# Patient Record
Sex: FEMALE
[De-identification: names, ages, dates, MRNs, and addresses within clinical notes are randomized per-mention and may not be internally consistent; named-entity substitution may affect disease eponyms.]

---

## 2017-01-24 ENCOUNTER — APPOINTMENT (OUTPATIENT)
Dept: OBGYN | Facility: CLINIC | Age: 67
End: 2017-01-24

## 2017-01-24 VITALS
SYSTOLIC BLOOD PRESSURE: 138 MMHG | WEIGHT: 190 LBS | BODY MASS INDEX: 30.53 KG/M2 | HEIGHT: 66 IN | DIASTOLIC BLOOD PRESSURE: 78 MMHG

## 2017-01-26 LAB — BACTERIA UR CULT: ABNORMAL

## 2017-12-14 ENCOUNTER — APPOINTMENT (OUTPATIENT)
Dept: OBGYN | Facility: CLINIC | Age: 67
End: 2017-12-14
Payer: MEDICARE

## 2017-12-14 VITALS
WEIGHT: 190 LBS | DIASTOLIC BLOOD PRESSURE: 69 MMHG | BODY MASS INDEX: 30.53 KG/M2 | HEIGHT: 66 IN | SYSTOLIC BLOOD PRESSURE: 146 MMHG

## 2017-12-14 LAB
BILIRUB UR QL STRIP: NORMAL
GLUCOSE UR-MCNC: NORMAL
HCG UR QL: 0.2 EU/DL
HGB UR QL STRIP.AUTO: NORMAL
LEUKOCYTE ESTERASE UR QL STRIP: NORMAL
NITRITE UR QL STRIP: NORMAL
PH UR STRIP: 5.5
PROT UR STRIP-MCNC: NORMAL

## 2017-12-14 PROCEDURE — 99213 OFFICE O/P EST LOW 20 MIN: CPT

## 2017-12-14 PROCEDURE — 81002 URINALYSIS NONAUTO W/O SCOPE: CPT

## 2017-12-20 ENCOUNTER — MESSAGE (OUTPATIENT)
Age: 67
End: 2017-12-20

## 2018-02-15 ENCOUNTER — APPOINTMENT (OUTPATIENT)
Dept: OBGYN | Facility: CLINIC | Age: 68
End: 2018-02-15
Payer: MEDICARE

## 2018-02-15 VITALS
DIASTOLIC BLOOD PRESSURE: 79 MMHG | HEIGHT: 66 IN | BODY MASS INDEX: 29.73 KG/M2 | SYSTOLIC BLOOD PRESSURE: 152 MMHG | WEIGHT: 185 LBS

## 2018-02-15 LAB
APPEARANCE: ABNORMAL
BACTERIA: ABNORMAL
BILIRUB UR QL STRIP: NORMAL
BILIRUBIN URINE: NEGATIVE
BLOOD URINE: ABNORMAL
COLOR: YELLOW
GLUCOSE QUALITATIVE U: NEGATIVE MG/DL
GLUCOSE UR-MCNC: NORMAL
HCG UR QL: 0.2 EU/DL
HGB UR QL STRIP.AUTO: NORMAL
HYALINE CASTS: 0 /LPF
KETONES UR-MCNC: NORMAL
KETONES URINE: NEGATIVE
LEUKOCYTE ESTERASE UR QL STRIP: NORMAL
LEUKOCYTE ESTERASE URINE: ABNORMAL
MICROSCOPIC-UA: NORMAL
NITRITE UR QL STRIP: NORMAL
NITRITE URINE: POSITIVE
PH UR STRIP: 6
PH URINE: 6
PROT UR STRIP-MCNC: 30
PROTEIN URINE: 30 MG/DL
RED BLOOD CELLS URINE: 25 /HPF
SP GR UR STRIP: 1.02
SPECIFIC GRAVITY URINE: 1.01
SQUAMOUS EPITHELIAL CELLS: 1 /HPF
UROBILINOGEN URINE: NEGATIVE MG/DL
WHITE BLOOD CELLS URINE: 558 /HPF

## 2018-02-15 PROCEDURE — 81002 URINALYSIS NONAUTO W/O SCOPE: CPT

## 2018-02-15 PROCEDURE — 99214 OFFICE O/P EST MOD 30 MIN: CPT

## 2018-02-15 RX ORDER — AMOXICILLIN 500 MG/1
500 CAPSULE ORAL
Qty: 40 | Refills: 0 | Status: COMPLETED | COMMUNITY
Start: 2017-11-21

## 2018-02-17 ENCOUNTER — MESSAGE (OUTPATIENT)
Age: 68
End: 2018-02-17

## 2018-02-17 LAB — BACTERIA UR CULT: ABNORMAL

## 2018-03-06 ENCOUNTER — RX RENEWAL (OUTPATIENT)
Age: 68
End: 2018-03-06

## 2018-03-06 ENCOUNTER — APPOINTMENT (OUTPATIENT)
Dept: OBGYN | Facility: CLINIC | Age: 68
End: 2018-03-06
Payer: MEDICARE

## 2018-03-06 VITALS
SYSTOLIC BLOOD PRESSURE: 130 MMHG | DIASTOLIC BLOOD PRESSURE: 83 MMHG | WEIGHT: 185 LBS | HEIGHT: 66 IN | BODY MASS INDEX: 29.73 KG/M2

## 2018-03-06 PROCEDURE — 99214 OFFICE O/P EST MOD 30 MIN: CPT

## 2018-03-06 RX ORDER — NITROFURANTOIN MACROCRYSTALS 100 MG/1
100 CAPSULE ORAL
Qty: 10 | Refills: 0 | Status: COMPLETED | COMMUNITY
Start: 2018-02-15 | End: 2018-03-06

## 2018-03-06 RX ORDER — PENICILLIN V POTASSIUM 500 MG/1
500 TABLET, FILM COATED ORAL
Qty: 28 | Refills: 0 | Status: COMPLETED | COMMUNITY
Start: 2018-02-02

## 2018-03-06 RX ORDER — TERCONAZOLE 4 MG/G
0.4 CREAM VAGINAL
Qty: 3 | Refills: 0 | Status: COMPLETED | COMMUNITY
Start: 2017-12-14 | End: 2018-03-06

## 2018-03-06 RX ORDER — CIPROFLOXACIN HYDROCHLORIDE 500 MG/1
500 TABLET, FILM COATED ORAL
Qty: 10 | Refills: 0 | Status: COMPLETED | COMMUNITY
Start: 2017-01-24 | End: 2018-03-06

## 2018-03-06 RX ORDER — TRIAMCINOLONE ACETONIDE 1 MG/G
0.1 CREAM TOPICAL
Qty: 30 | Refills: 0 | Status: COMPLETED | COMMUNITY
Start: 2018-02-07

## 2018-04-12 ENCOUNTER — APPOINTMENT (OUTPATIENT)
Dept: OBGYN | Facility: CLINIC | Age: 68
End: 2018-04-12
Payer: MEDICARE

## 2018-04-12 VITALS
WEIGHT: 186 LBS | SYSTOLIC BLOOD PRESSURE: 128 MMHG | HEIGHT: 66 IN | DIASTOLIC BLOOD PRESSURE: 77 MMHG | BODY MASS INDEX: 29.89 KG/M2

## 2018-04-12 PROCEDURE — 99214 OFFICE O/P EST MOD 30 MIN: CPT

## 2018-04-14 LAB
CANDIDA VAG CYTO: NOT DETECTED
G VAGINALIS+PREV SP MTYP VAG QL MICRO: NOT DETECTED
T VAGINALIS VAG QL WET PREP: NOT DETECTED

## 2018-10-31 ENCOUNTER — APPOINTMENT (OUTPATIENT)
Dept: OBGYN | Facility: CLINIC | Age: 68
End: 2018-10-31
Payer: MEDICARE

## 2018-10-31 VITALS
HEIGHT: 66 IN | DIASTOLIC BLOOD PRESSURE: 77 MMHG | WEIGHT: 186 LBS | BODY MASS INDEX: 29.89 KG/M2 | SYSTOLIC BLOOD PRESSURE: 130 MMHG

## 2018-10-31 DIAGNOSIS — N60.02 SOLITARY CYST OF LEFT BREAST: ICD-10-CM

## 2018-10-31 PROCEDURE — 99214 OFFICE O/P EST MOD 30 MIN: CPT

## 2018-10-31 RX ORDER — METRONIDAZOLE 7.5 MG/G
0.75 GEL VAGINAL
Qty: 1 | Refills: 5 | Status: COMPLETED | COMMUNITY
Start: 2018-04-12 | End: 2018-10-31

## 2018-10-31 RX ORDER — CIPROFLOXACIN HYDROCHLORIDE 500 MG/1
500 TABLET, FILM COATED ORAL
Qty: 10 | Refills: 3 | Status: COMPLETED | COMMUNITY
Start: 2018-02-17 | End: 2018-10-31

## 2018-10-31 RX ORDER — ESTRADIOL 1 MG/1
1 TABLET ORAL DAILY
Qty: 1 | Refills: 1 | Status: COMPLETED | COMMUNITY
Start: 2018-03-06 | End: 2018-10-31

## 2018-11-02 LAB — BACTERIA UR CULT: NORMAL

## 2019-01-09 ENCOUNTER — APPOINTMENT (OUTPATIENT)
Dept: OBGYN | Facility: CLINIC | Age: 69
End: 2019-01-09
Payer: MEDICARE

## 2019-01-09 DIAGNOSIS — Z01.419 ENCOUNTER FOR GYNECOLOGICAL EXAMINATION (GENERAL) (ROUTINE) W/OUT ABNORMAL FINDINGS: ICD-10-CM

## 2019-01-09 PROCEDURE — G0101: CPT

## 2019-01-13 LAB — CYTOLOGY CVX/VAG DOC THIN PREP: NORMAL

## 2019-02-08 ENCOUNTER — RX RENEWAL (OUTPATIENT)
Age: 69
End: 2019-02-08

## 2019-02-19 ENCOUNTER — RECORD ABSTRACTING (OUTPATIENT)
Age: 69
End: 2019-02-19

## 2019-02-19 DIAGNOSIS — S69.92XD UNSPECIFIED INJURY OF LEFT WRIST, HAND AND FINGER(S), SUBSEQUENT ENCOUNTER: ICD-10-CM

## 2019-02-20 ENCOUNTER — APPOINTMENT (OUTPATIENT)
Dept: INTERNAL MEDICINE | Facility: CLINIC | Age: 69
End: 2019-02-20
Payer: MEDICARE

## 2019-02-20 VITALS
HEIGHT: 66 IN | BODY MASS INDEX: 29.89 KG/M2 | WEIGHT: 186 LBS | DIASTOLIC BLOOD PRESSURE: 80 MMHG | HEART RATE: 80 BPM | SYSTOLIC BLOOD PRESSURE: 135 MMHG

## 2019-02-20 PROCEDURE — 99214 OFFICE O/P EST MOD 30 MIN: CPT

## 2019-02-20 RX ORDER — ATORVASTATIN CALCIUM 20 MG/1
20 TABLET, FILM COATED ORAL
Qty: 90 | Refills: 0 | Status: DISCONTINUED | COMMUNITY
Start: 2017-09-27 | End: 2019-02-20

## 2019-02-20 RX ORDER — LEVOCETIRIZINE DIHYDROCHLORIDE 5 MG/1
5 TABLET ORAL
Qty: 30 | Refills: 0 | Status: DISCONTINUED | COMMUNITY
Start: 2017-11-03 | End: 2019-02-20

## 2019-02-20 RX ORDER — OMEPRAZOLE 20 MG/1
20 CAPSULE, DELAYED RELEASE ORAL
Qty: 20 | Refills: 0 | Status: DISCONTINUED | COMMUNITY
Start: 2017-11-21 | End: 2019-02-20

## 2019-02-20 RX ORDER — CLARITHROMYCIN 500 MG/1
500 TABLET, FILM COATED ORAL
Qty: 20 | Refills: 0 | Status: DISCONTINUED | COMMUNITY
Start: 2017-11-21 | End: 2019-02-20

## 2019-02-20 RX ORDER — PANTOPRAZOLE 40 MG/1
40 TABLET, DELAYED RELEASE ORAL
Qty: 90 | Refills: 1 | Status: DISCONTINUED | COMMUNITY
Start: 2018-01-22 | End: 2019-02-20

## 2019-02-20 RX ORDER — OMEPRAZOLE 40 MG/1
40 CAPSULE, DELAYED RELEASE ORAL
Qty: 60 | Refills: 0 | Status: DISCONTINUED | COMMUNITY
Start: 2017-11-03 | End: 2019-02-20

## 2019-02-20 RX ORDER — LISINOPRIL AND HYDROCHLOROTHIAZIDE TABLETS 10; 12.5 MG/1; MG/1
10-12.5 TABLET ORAL
Qty: 90 | Refills: 0 | Status: DISCONTINUED | COMMUNITY
Start: 2017-09-27 | End: 2019-02-20

## 2019-02-20 RX ORDER — PSYLLIUM HUSK 0.4 G
CAPSULE ORAL
Refills: 0 | Status: DISCONTINUED | COMMUNITY
End: 2019-02-20

## 2019-02-20 RX ORDER — CIPROFLOXACIN HYDROCHLORIDE 500 MG/1
500 TABLET, FILM COATED ORAL
Qty: 14 | Refills: 0 | Status: DISCONTINUED | COMMUNITY
Start: 2018-10-31 | End: 2019-02-20

## 2019-02-20 RX ORDER — NYSTATIN 100000 [USP'U]/G
100000 CREAM TOPICAL
Qty: 60 | Refills: 0 | Status: DISCONTINUED | COMMUNITY
Start: 2018-02-07 | End: 2019-02-20

## 2019-02-20 RX ORDER — NYSTATIN AND TRIAMCINOLONE ACETONIDE 100000; 1 MG/G; MG/G
100000-0.1 CREAM TOPICAL 4 TIMES DAILY
Qty: 1 | Refills: 3 | Status: DISCONTINUED | COMMUNITY
Start: 2017-12-20 | End: 2019-02-20

## 2019-02-20 NOTE — ASSESSMENT
[FreeTextEntry1] : htn\par chronic? recurent abd pain\par generalized muscle and joint aches--r/o occult rheum d/o PMR?\par \par rheum eval suggested--trial medrol dose pack\par ct a+p with po contrst\par pt and  voiced understanding of plan of care

## 2019-02-20 NOTE — HISTORY OF PRESENT ILLNESS
[de-identified] : generlized muscle aches--seen 12/18 for same --neg rheum labs--was advised to see rheum at that time. Poor historian. Neg cardiac w/u 2017. Egd (mookie) 2017 neg for similar symps

## 2019-02-20 NOTE — PHYSICAL EXAM
[No Acute Distress] : no acute distress [Well Nourished] : well nourished [No Respiratory Distress] : no respiratory distress  [Clear to Auscultation] : lungs were clear to auscultation bilaterally [Normal Rate] : normal rate  [Soft] : abdomen soft [No Masses] : no abdominal mass palpated [de-identified] : upper abd pain

## 2019-04-29 ENCOUNTER — APPOINTMENT (OUTPATIENT)
Dept: INTERNAL MEDICINE | Facility: CLINIC | Age: 69
End: 2019-04-29
Payer: MEDICARE

## 2019-04-29 ENCOUNTER — RX RENEWAL (OUTPATIENT)
Age: 69
End: 2019-04-29

## 2019-04-29 VITALS
RESPIRATION RATE: 16 BRPM | HEIGHT: 67 IN | DIASTOLIC BLOOD PRESSURE: 88 MMHG | WEIGHT: 192 LBS | BODY MASS INDEX: 30.13 KG/M2 | SYSTOLIC BLOOD PRESSURE: 160 MMHG

## 2019-04-29 PROCEDURE — 36415 COLL VENOUS BLD VENIPUNCTURE: CPT

## 2019-04-29 PROCEDURE — 99213 OFFICE O/P EST LOW 20 MIN: CPT | Mod: 25

## 2019-04-29 NOTE — HISTORY OF PRESENT ILLNESS
[de-identified] :  Poor historian--saw Dr. Ayala (rheum)--"osteoarthritis"--going to [place] for 3 months and requests pre-trip labwork..recent middle of night insomnia

## 2019-04-29 NOTE — PHYSICAL EXAM
[No Acute Distress] : no acute distress [No Respiratory Distress] : no respiratory distress  [Normal Rate] : normal rate  [Regular Rhythm] : with a regular rhythm [Soft] : abdomen soft [Non Tender] : non-tender

## 2019-04-30 LAB
ANION GAP SERPL CALC-SCNC: 14 MMOL/L
BUN SERPL-MCNC: 9 MG/DL
CALCIUM SERPL-MCNC: 9.7 MG/DL
CHLORIDE SERPL-SCNC: 102 MMOL/L
CHOLEST SERPL-MCNC: 190 MG/DL
CHOLEST/HDLC SERPL: 3.6 RATIO
CO2 SERPL-SCNC: 27 MMOL/L
CREAT SERPL-MCNC: 0.71 MG/DL
ESTIMATED AVERAGE GLUCOSE: 131 MG/DL
GLUCOSE SERPL-MCNC: 94 MG/DL
HBA1C MFR BLD HPLC: 6.2 %
HDLC SERPL-MCNC: 53 MG/DL
LDLC SERPL CALC-MCNC: 111 MG/DL
POTASSIUM SERPL-SCNC: 4.3 MMOL/L
SODIUM SERPL-SCNC: 143 MMOL/L
TRIGL SERPL-MCNC: 132 MG/DL

## 2019-05-02 ENCOUNTER — RX RENEWAL (OUTPATIENT)
Age: 69
End: 2019-05-02

## 2019-05-07 ENCOUNTER — RX RENEWAL (OUTPATIENT)
Age: 69
End: 2019-05-07

## 2019-11-14 ENCOUNTER — APPOINTMENT (OUTPATIENT)
Dept: OBGYN | Facility: CLINIC | Age: 69
End: 2019-11-14
Payer: MEDICARE

## 2019-11-14 VITALS
SYSTOLIC BLOOD PRESSURE: 157 MMHG | HEIGHT: 67 IN | WEIGHT: 190 LBS | DIASTOLIC BLOOD PRESSURE: 80 MMHG | BODY MASS INDEX: 29.82 KG/M2

## 2019-11-14 DIAGNOSIS — R82.90 UNSPECIFIED ABNORMAL FINDINGS IN URINE: ICD-10-CM

## 2019-11-14 DIAGNOSIS — R87.610 ATYPICAL SQUAMOUS CELLS OF UNDETERMINED SIGNIFICANCE ON CYTOLOGIC SMEAR OF CERVIX (ASC-US): ICD-10-CM

## 2019-11-14 DIAGNOSIS — N94.9 UNSPECIFIED CONDITION ASSOCIATED WITH FEMALE GENITAL ORGANS AND MENSTRUAL CYCLE: ICD-10-CM

## 2019-11-14 DIAGNOSIS — Z87.42 PERSONAL HISTORY OF OTHER DISEASES OF THE FEMALE GENITAL TRACT: ICD-10-CM

## 2019-11-14 DIAGNOSIS — L29.2 PRURITUS VULVAE: ICD-10-CM

## 2019-11-14 DIAGNOSIS — N81.9 FEMALE GENITAL PROLAPSE, UNSPECIFIED: ICD-10-CM

## 2019-11-14 DIAGNOSIS — R87.810 CERVICAL HIGH RISK HUMAN PAPILLOMAVIRUS (HPV) DNA TEST POSITIVE: ICD-10-CM

## 2019-11-14 DIAGNOSIS — B37.3 CANDIDIASIS OF VULVA AND VAGINA: ICD-10-CM

## 2019-11-14 PROCEDURE — 99214 OFFICE O/P EST MOD 30 MIN: CPT

## 2019-11-15 ENCOUNTER — APPOINTMENT (OUTPATIENT)
Dept: INTERNAL MEDICINE | Facility: CLINIC | Age: 69
End: 2019-11-15
Payer: MEDICARE

## 2019-11-15 VITALS
BODY MASS INDEX: 28.25 KG/M2 | WEIGHT: 180 LBS | DIASTOLIC BLOOD PRESSURE: 78 MMHG | TEMPERATURE: 98.1 F | HEIGHT: 67 IN | RESPIRATION RATE: 16 BRPM | OXYGEN SATURATION: 98 % | SYSTOLIC BLOOD PRESSURE: 136 MMHG | HEART RATE: 64 BPM

## 2019-11-15 VITALS — SYSTOLIC BLOOD PRESSURE: 128 MMHG | DIASTOLIC BLOOD PRESSURE: 78 MMHG

## 2019-11-15 DIAGNOSIS — S22.009A UNSPECIFIED FRACTURE OF UNSPECIFIED THORACIC VERTEBRA, INITIAL ENCOUNTER FOR CLOSED FRACTURE: ICD-10-CM

## 2019-11-15 LAB — HPV HIGH+LOW RISK DNA PNL CVX: NOT DETECTED

## 2019-11-15 PROCEDURE — 99214 OFFICE O/P EST MOD 30 MIN: CPT | Mod: 25

## 2019-11-15 PROCEDURE — G0444 DEPRESSION SCREEN ANNUAL: CPT | Mod: 59

## 2019-11-15 PROCEDURE — 36415 COLL VENOUS BLD VENIPUNCTURE: CPT

## 2019-11-15 PROCEDURE — 90662 IIV NO PRSV INCREASED AG IM: CPT

## 2019-11-15 PROCEDURE — G0008: CPT

## 2019-11-15 NOTE — HISTORY OF PRESENT ILLNESS
[FreeTextEntry1] : f/u [de-identified] : f/u--poor historian--was overseas and apparently fracture lower back ?T6? from fall on step. Denies radicular symps. No surgical management

## 2019-11-15 NOTE — PHYSICAL EXAM
[Normal] : normal gait, coordination grossly intact, no focal deficits and deep tendon reflexes were 2+ and symmetric [de-identified] : local lower t spine pain

## 2019-11-18 ENCOUNTER — APPOINTMENT (OUTPATIENT)
Dept: OBGYN | Facility: CLINIC | Age: 69
End: 2019-11-18
Payer: MEDICARE

## 2019-11-18 ENCOUNTER — ASOB RESULT (OUTPATIENT)
Age: 69
End: 2019-11-18

## 2019-11-18 LAB
ANION GAP SERPL CALC-SCNC: 14 MMOL/L
BASOPHILS # BLD AUTO: 0.05 K/UL
BASOPHILS NFR BLD AUTO: 0.8 %
BUN SERPL-MCNC: 16 MG/DL
CALCIUM SERPL-MCNC: 9.9 MG/DL
CHLORIDE SERPL-SCNC: 99 MMOL/L
CO2 SERPL-SCNC: 27 MMOL/L
CREAT SERPL-MCNC: 0.7 MG/DL
EOSINOPHIL # BLD AUTO: 0.24 K/UL
EOSINOPHIL NFR BLD AUTO: 3.8 %
ESTIMATED AVERAGE GLUCOSE: 123 MG/DL
GLUCOSE SERPL-MCNC: 99 MG/DL
HBA1C MFR BLD HPLC: 5.9 %
HCT VFR BLD CALC: 40 %
HGB BLD-MCNC: 12.4 G/DL
IMM GRANULOCYTES NFR BLD AUTO: 0.3 %
LYMPHOCYTES # BLD AUTO: 2.67 K/UL
LYMPHOCYTES NFR BLD AUTO: 41.7 %
MAN DIFF?: NORMAL
MCHC RBC-ENTMCNC: 29.4 PG
MCHC RBC-ENTMCNC: 31 GM/DL
MCV RBC AUTO: 94.8 FL
MONOCYTES # BLD AUTO: 0.66 K/UL
MONOCYTES NFR BLD AUTO: 10.3 %
NEUTROPHILS # BLD AUTO: 2.76 K/UL
NEUTROPHILS NFR BLD AUTO: 43.1 %
PLATELET # BLD AUTO: 291 K/UL
POTASSIUM SERPL-SCNC: 4.4 MMOL/L
RBC # BLD: 4.22 M/UL
RBC # FLD: 13.9 %
SODIUM SERPL-SCNC: 140 MMOL/L
WBC # FLD AUTO: 6.4 K/UL

## 2019-11-18 PROCEDURE — 76830 TRANSVAGINAL US NON-OB: CPT

## 2019-11-20 LAB — CYTOLOGY CVX/VAG DOC THIN PREP: ABNORMAL

## 2019-11-22 ENCOUNTER — APPOINTMENT (OUTPATIENT)
Dept: CARDIOLOGY | Facility: CLINIC | Age: 69
End: 2019-11-22
Payer: MEDICARE

## 2019-11-22 ENCOUNTER — LABORATORY RESULT (OUTPATIENT)
Age: 69
End: 2019-11-22

## 2019-11-22 ENCOUNTER — NON-APPOINTMENT (OUTPATIENT)
Age: 69
End: 2019-11-22

## 2019-11-22 VITALS
SYSTOLIC BLOOD PRESSURE: 130 MMHG | HEIGHT: 67 IN | TEMPERATURE: 98.4 F | DIASTOLIC BLOOD PRESSURE: 77 MMHG | OXYGEN SATURATION: 97 % | RESPIRATION RATE: 16 BRPM | BODY MASS INDEX: 28.56 KG/M2 | HEART RATE: 61 BPM | WEIGHT: 182 LBS

## 2019-11-22 PROCEDURE — 93306 TTE W/DOPPLER COMPLETE: CPT

## 2019-11-22 PROCEDURE — 93000 ELECTROCARDIOGRAM COMPLETE: CPT

## 2019-11-22 PROCEDURE — 93880 EXTRACRANIAL BILAT STUDY: CPT

## 2019-11-22 PROCEDURE — 99215 OFFICE O/P EST HI 40 MIN: CPT

## 2019-11-22 PROCEDURE — 36415 COLL VENOUS BLD VENIPUNCTURE: CPT

## 2019-11-22 NOTE — REVIEW OF SYSTEMS
[see HPI] : see HPI [Dyspnea on exertion] : dyspnea during exertion [Palpitations] : palpitations [Joint Pain] : joint pain [Dizziness] : dizziness [Fever] : no fever [Recent Weight Gain (___ Lbs)] : no recent weight gain [Chills] : no chills [Recent Weight Loss (___ Lbs)] : no recent weight loss [Chest Pain] : no chest pain [Lower Ext Edema] : no extremity edema [Cough] : no cough [Wheezing] : no wheezing [Abdominal Pain] : no abdominal pain [Nausea] : no nausea [Vomiting] : no vomiting [Confusion] : no confusion was observed

## 2019-11-22 NOTE — PHYSICAL EXAM
[General Appearance - Well Developed] : well developed [Normal Appearance] : normal appearance [General Appearance - Well Nourished] : well nourished [Normal Conjunctiva] : the conjunctiva exhibited no abnormalities [Heart Rate And Rhythm] : heart rate and rhythm were normal [Heart Sounds] : normal S1 and S2 [Murmurs] : no murmurs present [Edema] : no peripheral edema present [Bowel Sounds] : normal bowel sounds [Abdomen Soft] : soft [Abdomen Tenderness] : non-tender [Skin Turgor] : normal skin turgor [Oriented To Time, Place, And Person] : oriented to person, place, and time [Affect] : the affect was normal [Mood] : the mood was normal [FreeTextEntry1] : regular

## 2019-11-22 NOTE — DISCUSSION/SUMMARY
[FreeTextEntry1] : 69F with HTN presents for initial visit with concerns for TIA\par \par 1. Amaurosis fugax, TIA\par EKG showing sinus, no known hx of afib.\par will consider event monitor at next appt\par check carotid, tte today. \par plan to start atorvastatin 20 mg\par cont asa 81\par f/u in 1 month or sooner pending results of studies\par \par \par ADDENDUM\par reviewed results of carotid duplex (full report to be scanned into [employer] chart) ICA and ECA poorly visualized at various segments, will send pt for CTA of the neck for further eval. pt called and discussed with pt and daughter on tlephone\par plan is for cta neck, cont  ASA 81, start statin: lipitor 40. f/u with Dr Mccullough in 1-2 weeks for eval  and cards f/u in 3-4 weeks

## 2019-11-22 NOTE — HISTORY OF PRESENT ILLNESS
[FreeTextEntry1] : 69F with HTN presents to establish cardiovascular care. \par PMD: Dr Ronnie Mccullough\par sent in by ophthalmologist with concerns for TIA\par \par \par pt states 3 days ago she experienced an episode where she couldn’t see out her right eye for 2-3 minutes, saw only black out her right eye. she maintained regular vision in her left eye. \par pt denies prev similar. \par pt denies prev events of slurred speech, aphasia, acute extremity weakness. \par pr denies sob, but does endorse feeling her heartbeat in her neck on occasion. (around once a month)\par \par pt does endorse pressure on the chest when she tries to walk fast, pt states every time she walks fast. \par \par recent labs done with PMD 11/19 reveal a1c 5.9, Cr .7. most recent lipid panel 4/19 reveal tot chol 190,  hdl 53 ldl 111\par \par med hx: arthritis. \par sx hx: jay. shoulder sx 20 yrs ago\par fam hx: denies hx of cardiac/cva\par social hx: lives at home, ( smokes) denies hc of tob, etoh. \par meds: asa 81, atenolol 50\par allergies: NKDA\par \par  rose \par \par cell  \par

## 2019-12-05 ENCOUNTER — APPOINTMENT (OUTPATIENT)
Dept: OBGYN | Facility: CLINIC | Age: 69
End: 2019-12-05
Payer: MEDICARE

## 2019-12-05 ENCOUNTER — LABORATORY RESULT (OUTPATIENT)
Age: 69
End: 2019-12-05

## 2019-12-05 VITALS
BODY MASS INDEX: 28.25 KG/M2 | HEIGHT: 67 IN | WEIGHT: 180 LBS | DIASTOLIC BLOOD PRESSURE: 74 MMHG | SYSTOLIC BLOOD PRESSURE: 134 MMHG

## 2019-12-05 DIAGNOSIS — R10.2 PELVIC AND PERINEAL PAIN: ICD-10-CM

## 2019-12-05 LAB
APPEARANCE: ABNORMAL
BILIRUBIN URINE: NEGATIVE
BLOOD URINE: ABNORMAL
COLOR: COLORLESS
GLUCOSE QUALITATIVE U: NEGATIVE
KETONES URINE: NEGATIVE
LEUKOCYTE ESTERASE URINE: NEGATIVE
NITRITE URINE: NEGATIVE
PH URINE: 7
PROTEIN URINE: NEGATIVE
SPECIFIC GRAVITY URINE: 1
UROBILINOGEN URINE: NORMAL

## 2019-12-05 PROCEDURE — 99213 OFFICE O/P EST LOW 20 MIN: CPT

## 2019-12-09 LAB — BACTERIA UR CULT: NORMAL

## 2019-12-23 ENCOUNTER — APPOINTMENT (OUTPATIENT)
Dept: INTERNAL MEDICINE | Facility: CLINIC | Age: 69
End: 2019-12-23
Payer: MEDICARE

## 2019-12-23 VITALS
SYSTOLIC BLOOD PRESSURE: 110 MMHG | HEART RATE: 64 BPM | HEIGHT: 67 IN | WEIGHT: 183 LBS | OXYGEN SATURATION: 96 % | BODY MASS INDEX: 28.72 KG/M2 | DIASTOLIC BLOOD PRESSURE: 66 MMHG | TEMPERATURE: 97.5 F

## 2019-12-23 DIAGNOSIS — G47.00 INSOMNIA, UNSPECIFIED: ICD-10-CM

## 2019-12-23 PROCEDURE — 99213 OFFICE O/P EST LOW 20 MIN: CPT

## 2019-12-23 NOTE — PHYSICAL EXAM
[Soft] : abdomen soft [Non-distended] : non-distended [No Masses] : no abdominal mass palpated [No HSM] : no HSM [Normal Bowel Sounds] : normal bowel sounds [Normal] : affect was normal and insight and judgment were intact [de-identified] : slight TTP of epigastric region

## 2019-12-23 NOTE — HISTORY OF PRESENT ILLNESS
[Spouse] : spouse [FreeTextEntry8] : 68 yo female c/o difficulty sleeping at night due to anxiety for the past week. States that because of the loss of sleep? she is feeling tiredness in arms and legs? also admits to some stomach pains over the past 5 days that causes her nausea. upon further questioning, pt states that she has been on bactrim for 5 days and has 2 days left. \par when questioned further, pt states that she is not here for her complaints but for med refill from a prior anxiety med from Dr Mccullough (xanax).

## 2019-12-23 NOTE — PLAN
[FreeTextEntry1] : sent prn supply of xanax 0.25 mg, educated pt not to drink on medication and to take solely as needed since only gave 10 pills\par educated pt that nausea and reflux feeling are most likely from bactrim that has been on since symptoms started the same day that abx was started, cont ppi that currently takes for reflux\par pt needs to make appointment with cardio as per Dr Carrero- george to return around now for 4 week follow up to discuss results of tests ordered

## 2019-12-23 NOTE — REVIEW OF SYSTEMS
[Negative] : Heme/Lymph [Muscle Weakness] : muscle weakness [Nausea] : nausea [Insomnia] : insomnia [Anxiety] : anxiety

## 2019-12-25 PROBLEM — R10.2 PELVIC PAIN IN FEMALE: Status: ACTIVE | Noted: 2017-01-24

## 2020-05-26 ENCOUNTER — APPOINTMENT (OUTPATIENT)
Dept: INTERNAL MEDICINE | Facility: CLINIC | Age: 70
End: 2020-05-26
Payer: MEDICARE

## 2020-05-26 VITALS
OXYGEN SATURATION: 98 % | RESPIRATION RATE: 16 BRPM | TEMPERATURE: 98.2 F | SYSTOLIC BLOOD PRESSURE: 146 MMHG | WEIGHT: 186 LBS | BODY MASS INDEX: 29.19 KG/M2 | HEIGHT: 67 IN | DIASTOLIC BLOOD PRESSURE: 80 MMHG | HEART RATE: 73 BPM

## 2020-05-26 PROCEDURE — 99214 OFFICE O/P EST MOD 30 MIN: CPT | Mod: 25

## 2020-05-26 PROCEDURE — 36415 COLL VENOUS BLD VENIPUNCTURE: CPT

## 2020-05-26 RX ORDER — ASCORBIC ACID 500 MG
TABLET ORAL
Refills: 0 | Status: ACTIVE | COMMUNITY

## 2020-05-26 RX ORDER — METHYLPREDNISOLONE 4 MG/1
4 TABLET ORAL
Qty: 1 | Refills: 0 | Status: DISCONTINUED | COMMUNITY
Start: 2019-02-20 | End: 2020-05-26

## 2020-05-26 RX ORDER — TRAMADOL HYDROCHLORIDE 50 MG/1
50 TABLET, COATED ORAL
Qty: 21 | Refills: 0 | Status: DISCONTINUED | COMMUNITY
Start: 2019-12-06 | End: 2020-05-26

## 2020-05-26 RX ORDER — ADHESIVE TAPE 3"X 2.3 YD
50 MCG TAPE, NON-MEDICATED TOPICAL
Refills: 0 | Status: ACTIVE | COMMUNITY

## 2020-05-26 RX ORDER — SULFAMETHOXAZOLE AND TRIMETHOPRIM 400; 80 MG/1; MG/1
400-80 TABLET ORAL
Qty: 14 | Refills: 0 | Status: DISCONTINUED | COMMUNITY
Start: 2019-12-18 | End: 2020-05-26

## 2020-05-26 NOTE — HISTORY OF PRESENT ILLNESS
[Hyperlipidemia] : Hyperlipidemia [Hypertension] : Hypertension [Other: ___] : [unfilled] [<140/90] : Target blood pressure is <140/90 [Does not check BP] : The patient is not checking blood pressure [Near target goal] : BP near target goal [Stable] : Patient is stable [Patient declined therapy] : Patient declined statin therapy [Landonifestyle management] : lifestyle management [Spouse] : spouse [Patient was last seen on ___] : Patient was last seen on [unfilled] [FreeTextEntry1] : anxiety- stable, no new symptoms

## 2020-05-26 NOTE — PHYSICAL EXAM
[Normal] : normal sclera/conjunctiva, pupils are equal, round and reactive to light and extraocular movements are intact [Normal TMs] : both tympanic membranes were normal [Normal Outer Ear/Nose] : the outer ears and nose were normal in appearance

## 2020-06-01 LAB
ALBUMIN SERPL ELPH-MCNC: 4.6 G/DL
ALP BLD-CCNC: 74 U/L
ALT SERPL-CCNC: <5 U/L
ANION GAP SERPL CALC-SCNC: 12 MMOL/L
AST SERPL-CCNC: 7 U/L
BASOPHILS # BLD AUTO: 0.06 K/UL
BASOPHILS NFR BLD AUTO: 0.8 %
BILIRUB SERPL-MCNC: 0.4 MG/DL
BUN SERPL-MCNC: 12 MG/DL
CALCIUM SERPL-MCNC: 9.6 MG/DL
CHLORIDE SERPL-SCNC: 101 MMOL/L
CHOLEST SERPL-MCNC: 164 MG/DL
CHOLEST/HDLC SERPL: 2.8 RATIO
CO2 SERPL-SCNC: 28 MMOL/L
CREAT SERPL-MCNC: 0.72 MG/DL
EOSINOPHIL # BLD AUTO: 0.24 K/UL
EOSINOPHIL NFR BLD AUTO: 3.4 %
ESTIMATED AVERAGE GLUCOSE: 126 MG/DL
GLUCOSE SERPL-MCNC: 109 MG/DL
HBA1C MFR BLD HPLC: 6 %
HCT VFR BLD CALC: 39.1 %
HDLC SERPL-MCNC: 58 MG/DL
HGB BLD-MCNC: 12.2 G/DL
IMM GRANULOCYTES NFR BLD AUTO: 0.4 %
LDLC SERPL CALC-MCNC: 79 MG/DL
LYMPHOCYTES # BLD AUTO: 2.62 K/UL
LYMPHOCYTES NFR BLD AUTO: 37 %
MAN DIFF?: NORMAL
MCHC RBC-ENTMCNC: 30.7 PG
MCHC RBC-ENTMCNC: 31.2 GM/DL
MCV RBC AUTO: 98.2 FL
MONOCYTES # BLD AUTO: 0.78 K/UL
MONOCYTES NFR BLD AUTO: 11 %
NEUTROPHILS # BLD AUTO: 3.36 K/UL
NEUTROPHILS NFR BLD AUTO: 47.4 %
PLATELET # BLD AUTO: 244 K/UL
POTASSIUM SERPL-SCNC: 5 MMOL/L
PROT SERPL-MCNC: 7.4 G/DL
RBC # BLD: 3.98 M/UL
RBC # FLD: 12.7 %
SODIUM SERPL-SCNC: 141 MMOL/L
TRIGL SERPL-MCNC: 134 MG/DL
WBC # FLD AUTO: 7.09 K/UL

## 2020-06-30 ENCOUNTER — APPOINTMENT (OUTPATIENT)
Dept: INTERNAL MEDICINE | Facility: CLINIC | Age: 70
End: 2020-06-30
Payer: MEDICARE

## 2020-06-30 VITALS
SYSTOLIC BLOOD PRESSURE: 131 MMHG | TEMPERATURE: 97.8 F | OXYGEN SATURATION: 95 % | RESPIRATION RATE: 16 BRPM | HEART RATE: 69 BPM | WEIGHT: 187 LBS | DIASTOLIC BLOOD PRESSURE: 79 MMHG | HEIGHT: 67 IN | BODY MASS INDEX: 29.35 KG/M2

## 2020-06-30 PROCEDURE — 99214 OFFICE O/P EST MOD 30 MIN: CPT | Mod: 25

## 2020-06-30 PROCEDURE — G0444 DEPRESSION SCREEN ANNUAL: CPT | Mod: 59

## 2020-06-30 PROCEDURE — 36415 COLL VENOUS BLD VENIPUNCTURE: CPT

## 2020-06-30 NOTE — PHYSICAL EXAM
[No JVD] : no jugular venous distention [Normal] : normal rate, regular rhythm, normal S1 and S2 and no murmur heard [Coordination Grossly Intact] : coordination grossly intact [Normal Gait] : normal gait [Anxious] : anxious [No Focal Deficits] : no focal deficits

## 2020-06-30 NOTE — REVIEW OF SYSTEMS
[Joint Stiffness] : joint stiffness [Joint Pain] : joint pain [Headache] : headache [Muscle Pain] : muscle pain [Insomnia] : insomnia [Negative] : Respiratory [Vision Problems] : no vision problems [Pain] : no pain [Fainting] : no fainting [Dizziness] : no dizziness [Memory Loss] : no memory loss [Confusion] : no confusion

## 2020-06-30 NOTE — HISTORY OF PRESENT ILLNESS
[FreeTextEntry8] : CC: Headache\par \par New persistent HA for past 5 days located along left parietal region.  Went to  on Sunday with negative CT. \par States has tried tylenol and aleve without relief.  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 \par Denies new vision changes, focal or sensory deficits. \par Hx of chronic cervical pain-currently endorses pain which radiates into upper posterior neck

## 2020-07-01 LAB
ALBUMIN SERPL ELPH-MCNC: 5 G/DL
ALP BLD-CCNC: 80 U/L
ALT SERPL-CCNC: 10 U/L
ANION GAP SERPL CALC-SCNC: 16 MMOL/L
AST SERPL-CCNC: 18 U/L
BILIRUB SERPL-MCNC: 0.4 MG/DL
BUN SERPL-MCNC: 15 MG/DL
CALCIUM SERPL-MCNC: 10 MG/DL
CHLORIDE SERPL-SCNC: 101 MMOL/L
CO2 SERPL-SCNC: 22 MMOL/L
CREAT SERPL-MCNC: 0.91 MG/DL
GLUCOSE SERPL-MCNC: 100 MG/DL
POTASSIUM SERPL-SCNC: 4.5 MMOL/L
PROT SERPL-MCNC: 7.7 G/DL
SODIUM SERPL-SCNC: 139 MMOL/L

## 2020-07-02 LAB
BASOPHILS # BLD AUTO: 0.06 K/UL
BASOPHILS NFR BLD AUTO: 0.8 %
EOSINOPHIL # BLD AUTO: 0.25 K/UL
EOSINOPHIL NFR BLD AUTO: 3.1 %
ERYTHROCYTE [SEDIMENTATION RATE] IN BLOOD BY WESTERGREN METHOD: 17 MM/HR
HCT VFR BLD CALC: 37 %
HGB BLD-MCNC: 11.9 G/DL
IMM GRANULOCYTES NFR BLD AUTO: 0.4 %
LYMPHOCYTES # BLD AUTO: 3.04 K/UL
LYMPHOCYTES NFR BLD AUTO: 38.1 %
MAN DIFF?: NORMAL
MCHC RBC-ENTMCNC: 30.9 PG
MCHC RBC-ENTMCNC: 32.2 GM/DL
MCV RBC AUTO: 96.1 FL
MONOCYTES # BLD AUTO: 0.85 K/UL
MONOCYTES NFR BLD AUTO: 10.7 %
NEUTROPHILS # BLD AUTO: 3.75 K/UL
NEUTROPHILS NFR BLD AUTO: 46.9 %
PLATELET # BLD AUTO: 259 K/UL
RBC # BLD: 3.85 M/UL
RBC # FLD: 13.1 %
WBC # FLD AUTO: 7.98 K/UL

## 2020-07-09 ENCOUNTER — APPOINTMENT (OUTPATIENT)
Dept: NEUROLOGY | Facility: CLINIC | Age: 70
End: 2020-07-09

## 2020-08-17 ENCOUNTER — RX RENEWAL (OUTPATIENT)
Age: 70
End: 2020-08-17

## 2020-08-18 ENCOUNTER — RX RENEWAL (OUTPATIENT)
Age: 70
End: 2020-08-18

## 2020-10-05 ENCOUNTER — APPOINTMENT (OUTPATIENT)
Dept: CARDIOLOGY | Facility: CLINIC | Age: 70
End: 2020-10-05
Payer: MEDICARE

## 2020-10-05 ENCOUNTER — NON-APPOINTMENT (OUTPATIENT)
Age: 70
End: 2020-10-05

## 2020-10-05 VITALS
OXYGEN SATURATION: 95 % | TEMPERATURE: 99 F | WEIGHT: 190 LBS | DIASTOLIC BLOOD PRESSURE: 75 MMHG | HEIGHT: 67 IN | SYSTOLIC BLOOD PRESSURE: 129 MMHG | RESPIRATION RATE: 16 BRPM | HEART RATE: 67 BPM | BODY MASS INDEX: 29.82 KG/M2

## 2020-10-05 PROCEDURE — 93000 ELECTROCARDIOGRAM COMPLETE: CPT

## 2020-10-05 PROCEDURE — 99215 OFFICE O/P EST HI 40 MIN: CPT

## 2020-10-05 NOTE — HISTORY OF PRESENT ILLNESS
[FreeTextEntry1] : 70F with HTN presents for f/u\par PMD: Dr Ronnie Mccullough\par \par pt last seen in cardiology in 11/19 for an initial eval with concerns for a TIA. \par \par sent in by ophthalmologist with concerns for TIA. TTE done at that time was grossly normal. carotid u/s done was without evidence of severe stenosis, but images were limited, CTA neck showed no significant carotid stenosis but did show b/l supraclinoid int carotid anueryms 4 mm on R and 6mm on L.\par \par labs done with PMD 11/19 showed a1c 5.9, Cr .7. most recent lipid panel 4/19 reveal tot chol 190,  hdl 53 ldl 111\par \par today, pt presents for f/u. pt c/o headaches, last one 3 weeks ago. \par pt denies cp, sob, at rest or on exertion. denies palpitations, syncope. \par pt states her BP has been high lately, she checks it at home, usually 140-150s systolic. \par \par \par med hx: arthritis. \par sx hx: jay. shoulder sx 20 yrs ago\par fam hx: denies hx of cardiac/cva\par social hx: lives at home, ( smokes) denies hc of tob, etoh. \par meds: asa 81, atenolol 50\par allergies: NKDA\par \par  rose \par \par cell  \par

## 2020-10-05 NOTE — REVIEW OF SYSTEMS
[Dizziness] : dizziness [Fever] : no fever [Recent Weight Gain (___ Lbs)] : no recent weight gain [Chills] : no chills [Recent Weight Loss (___ Lbs)] : no recent weight loss [Sore Throat] : no sore throat [Shortness Of Breath] : no shortness of breath [Dyspnea on exertion] : not dyspnea during exertion [Chest  Pressure] : no chest pressure [Chest Pain] : no chest pain [Lower Ext Edema] : no extremity edema [Leg Claudication] : no intermittent leg claudication [Palpitations] : no palpitations [Cough] : no cough [Wheezing] : no wheezing [Nausea] : no nausea [Vomiting] : no vomiting [Confusion] : no confusion was observed [Excessive Thirst] : no polydipsia [Easy Bleeding] : no tendency for easy bleeding [Easy Bruising] : no tendency for easy bruising

## 2020-10-05 NOTE — PHYSICAL EXAM
[General Appearance - Well Developed] : well developed [General Appearance - Well Nourished] : well nourished [] : no respiratory distress [Respiration, Rhythm And Depth] : normal respiratory rhythm and effort [Exaggerated Use Of Accessory Muscles For Inspiration] : no accessory muscle use [Auscultation Breath Sounds / Voice Sounds] : lungs were clear to auscultation bilaterally [Heart Rate And Rhythm] : heart rate and rhythm were normal [Heart Sounds] : normal S1 and S2 [Murmurs] : no murmurs present [Edema] : no peripheral edema present [Bowel Sounds] : normal bowel sounds [Abdomen Soft] : soft [Abdomen Tenderness] : non-tender [Abnormal Walk] : normal gait [Skin Turgor] : normal skin turgor [Oriented To Time, Place, And Person] : oriented to person, place, and time [Impaired Insight] : insight and judgment were intact [Affect] : the affect was normal [Mood] : the mood was normal [FreeTextEntry1] : radial pulses 2+ b/l

## 2020-10-05 NOTE — DISCUSSION/SUMMARY
[FreeTextEntry1] : 70F with HTN presents for f/u\par \par 1. Amaurosis fugax, TIA\par -no further episodes, however with persistent HA episodes, last >3 weeks ago\par -pt has f/u with her optho, will also refer to neuro\par -may be related to HTN\par -will d/c atenolol and start norvasc 10 po qd\par \par -f/u in 2 weeks for bp check, will consider adding ace-I at that time. \par \par \par \par

## 2020-10-19 ENCOUNTER — MED ADMIN CHARGE (OUTPATIENT)
Age: 70
End: 2020-10-19

## 2020-10-19 ENCOUNTER — APPOINTMENT (OUTPATIENT)
Dept: CARDIOLOGY | Facility: CLINIC | Age: 70
End: 2020-10-19
Payer: MEDICARE

## 2020-10-19 VITALS
WEIGHT: 190 LBS | HEIGHT: 67 IN | SYSTOLIC BLOOD PRESSURE: 125 MMHG | HEART RATE: 100 BPM | TEMPERATURE: 98.2 F | OXYGEN SATURATION: 97 % | DIASTOLIC BLOOD PRESSURE: 75 MMHG | BODY MASS INDEX: 29.82 KG/M2 | RESPIRATION RATE: 16 BRPM

## 2020-10-19 PROCEDURE — 99215 OFFICE O/P EST HI 40 MIN: CPT

## 2020-10-19 NOTE — PHYSICAL EXAM
[General Appearance - Well Developed] : well developed [General Appearance - Well Nourished] : well nourished [] : no respiratory distress [Respiration, Rhythm And Depth] : normal respiratory rhythm and effort [Auscultation Breath Sounds / Voice Sounds] : lungs were clear to auscultation bilaterally [Exaggerated Use Of Accessory Muscles For Inspiration] : no accessory muscle use [Heart Rate And Rhythm] : heart rate and rhythm were normal [Heart Sounds] : normal S1 and S2 [Murmurs] : no murmurs present [Edema] : no peripheral edema present [Abdomen Soft] : soft [Bowel Sounds] : normal bowel sounds [Skin Turgor] : normal skin turgor [Abdomen Tenderness] : non-tender [Abnormal Walk] : normal gait [Oriented To Time, Place, And Person] : oriented to person, place, and time [Affect] : the affect was normal [Impaired Insight] : insight and judgment were intact [Mood] : the mood was normal [FreeTextEntry1] : radial pulses 2+ b/l, carotid bruits not appreciated

## 2020-10-19 NOTE — REVIEW OF SYSTEMS
[Dizziness] : dizziness [Headache] : headache [Fever] : no fever [Chills] : no chills [Recent Weight Loss (___ Lbs)] : no recent weight loss [Recent Weight Gain (___ Lbs)] : no recent weight gain [Blurry Vision] : blurred vision [see HPI] : see HPI [Loss Of Hearing] : hearing loss [Shortness Of Breath] : no shortness of breath [Sore Throat] : no sore throat [Dyspnea on exertion] : not dyspnea during exertion [Chest  Pressure] : no chest pressure [Chest Pain] : no chest pain [Lower Ext Edema] : no extremity edema [Leg Claudication] : no intermittent leg claudication [Wheezing] : no wheezing [Cough] : no cough [Palpitations] : no palpitations [Vomiting] : no vomiting [Nausea] : no nausea [Excessive Thirst] : no polydipsia [Confusion] : no confusion was observed [Easy Bleeding] : no tendency for easy bleeding [Easy Bruising] : no tendency for easy bruising

## 2020-10-19 NOTE — HISTORY OF PRESENT ILLNESS
[FreeTextEntry1] : 70F with HTN presents for f/u\par PMD: Dr Ronnie Mccullough\par neuro: Jose Luis Rodrigues \par \par pt last seen in cardiology in 10/5/2020 for HA in setting of HTN. \par \par pt prev sent in by ophthalmologist with concerns for TIA. TTE done at that time was grossly normal. carotid u/s done was without evidence of severe stenosis, but images were limited, CTA neck showed no significant carotid stenosis but did show b/l supraclinoid int carotid anueryms 4 mm on R and 6mm on L.\par \par labs done with PMD 11/19 showed a1c 5.9, Cr .7. most recent lipid panel 4/19 reveal tot chol 190,  hdl 53 ldl 111\par \par following last visit, pt went to see neurology, per notes (to be scanned into [employer] chart) pt will begin therapeutic trigger point injections and will be sent for neurovascular/NeuroSx eval with Dr Adi Newell  (assoc with [facility])\par \par today, pt presents for f/u. pt c/o headaches,also c/o of persistent "whoosing" sound in her ears. \par pt states good compliance with norvasc but since d/c the atenolol, her pulse has been consistently over 100. \par \par pt states that the trigger injections have not been helpful. \par \par pt denies cp, sob, at rest or on exertion. denies palpitations, syncope. \par pt states her BP has been high lately, she checks it at home, bp log brought in today, usually 130-150s systolic. \par \par \par med hx: arthritis. \par sx hx: jay. shoulder sx 20 yrs ago\par fam hx: denies hx of cardiac/cva\par social hx: from [place]. lives at home, ( smokes) denies hc of tob, etoh. \par meds: asa 81, atenolol 50, norvasc 10\par allergies: NKDA\par \par  rose \par \par cell  \par \par

## 2020-10-19 NOTE — DISCUSSION/SUMMARY
[FreeTextEntry1] : 70F with HTN presents for f/u\par \par 1. Amaurosis fugax, TIA\par -no further episodes, however with persistent HA episodes, now with possible tinnitus\par -being followed by neuro and pt has appt to see neuroSx\par -HTN better controlled, now on norvasc but since tachycardia is persisting, will restart atenolol as well. \par -cont bp log\par \par -flu shot today\par -f/u in 1 mo for bp check\par \par \par \par \par

## 2020-11-23 ENCOUNTER — APPOINTMENT (OUTPATIENT)
Dept: CARDIOLOGY | Facility: CLINIC | Age: 70
End: 2020-11-23

## 2020-12-21 PROBLEM — Z01.419 ENCOUNTER FOR GYNECOLOGICAL EXAMINATION WITHOUT ABNORMAL FINDING: Status: RESOLVED | Noted: 2019-01-09 | Resolved: 2020-12-21

## 2021-01-09 ENCOUNTER — APPOINTMENT (OUTPATIENT)
Dept: INTERNAL MEDICINE | Facility: CLINIC | Age: 71
End: 2021-01-09
Payer: MEDICARE

## 2021-01-09 VITALS
HEART RATE: 70 BPM | TEMPERATURE: 98.5 F | BODY MASS INDEX: 29.86 KG/M2 | OXYGEN SATURATION: 95 % | HEIGHT: 67 IN | WEIGHT: 190.25 LBS

## 2021-01-09 VITALS — DIASTOLIC BLOOD PRESSURE: 72 MMHG | SYSTOLIC BLOOD PRESSURE: 130 MMHG

## 2021-01-09 PROCEDURE — 99214 OFFICE O/P EST MOD 30 MIN: CPT | Mod: 25

## 2021-01-09 PROCEDURE — 36415 COLL VENOUS BLD VENIPUNCTURE: CPT

## 2021-01-09 NOTE — HISTORY OF PRESENT ILLNESS
[Family Member] : family member [FreeTextEntry8] : vague historian\par reviewed recent cardio notes\par reviewed meds--stopped amlodipine\par seeing ortho for neck pain (tarelton)\par lower abd pain for 10 days--no bowel symps, no triggers\par saw nsx--brings scans in ?has f/u 6 months\par meds updated

## 2021-01-09 NOTE — PHYSICAL EXAM
[Normal] : normal rate, regular rhythm, normal S1 and S2 and no murmur heard [No Masses] : no abdominal mass palpated [No HSM] : no HSM [Normal Bowel Sounds] : normal bowel sounds [Non-distended] : non-distended [de-identified] : nonspec lower abd pain

## 2021-01-11 LAB
ALBUMIN SERPL ELPH-MCNC: 4.7 G/DL
ALP BLD-CCNC: 92 U/L
ALT SERPL-CCNC: 7 U/L
ANION GAP SERPL CALC-SCNC: 16 MMOL/L
APPEARANCE: CLEAR
AST SERPL-CCNC: 16 U/L
BACTERIA UR CULT: NORMAL
BACTERIA: NEGATIVE
BASOPHILS # BLD AUTO: 0.04 K/UL
BASOPHILS NFR BLD AUTO: 0.4 %
BILIRUB SERPL-MCNC: 0.4 MG/DL
BILIRUBIN URINE: NEGATIVE
BLOOD URINE: NEGATIVE
BUN SERPL-MCNC: 13 MG/DL
CALCIUM SERPL-MCNC: 9.8 MG/DL
CHLORIDE SERPL-SCNC: 102 MMOL/L
CO2 SERPL-SCNC: 23 MMOL/L
COLOR: YELLOW
CREAT SERPL-MCNC: 0.84 MG/DL
EOSINOPHIL # BLD AUTO: 0.31 K/UL
EOSINOPHIL NFR BLD AUTO: 3.4 %
ESTIMATED AVERAGE GLUCOSE: 123 MG/DL
GLUCOSE QUALITATIVE U: NEGATIVE
GLUCOSE SERPL-MCNC: 109 MG/DL
HBA1C MFR BLD HPLC: 5.9 %
HCT VFR BLD CALC: 39.5 %
HGB BLD-MCNC: 12.7 G/DL
HYALINE CASTS: 1 /LPF
IMM GRANULOCYTES NFR BLD AUTO: 0.2 %
KETONES URINE: NEGATIVE
LEUKOCYTE ESTERASE URINE: NEGATIVE
LYMPHOCYTES # BLD AUTO: 3.07 K/UL
LYMPHOCYTES NFR BLD AUTO: 34 %
MAN DIFF?: NORMAL
MCHC RBC-ENTMCNC: 29.7 PG
MCHC RBC-ENTMCNC: 32.2 GM/DL
MCV RBC AUTO: 92.3 FL
MICROSCOPIC-UA: NORMAL
MONOCYTES # BLD AUTO: 0.87 K/UL
MONOCYTES NFR BLD AUTO: 9.6 %
NEUTROPHILS # BLD AUTO: 4.71 K/UL
NEUTROPHILS NFR BLD AUTO: 52.4 %
NITRITE URINE: NEGATIVE
PH URINE: 6.5
PLATELET # BLD AUTO: 260 K/UL
POTASSIUM SERPL-SCNC: 4.6 MMOL/L
PROT SERPL-MCNC: 7.7 G/DL
PROTEIN URINE: NORMAL
RBC # BLD: 4.28 M/UL
RBC # FLD: 13.3 %
RED BLOOD CELLS URINE: 1 /HPF
SODIUM SERPL-SCNC: 140 MMOL/L
SPECIFIC GRAVITY URINE: 1.02
SQUAMOUS EPITHELIAL CELLS: 3 /HPF
UROBILINOGEN URINE: NORMAL
WBC # FLD AUTO: 9.02 K/UL
WHITE BLOOD CELLS URINE: 3 /HPF

## 2021-01-22 ENCOUNTER — APPOINTMENT (OUTPATIENT)
Dept: OBGYN | Facility: CLINIC | Age: 71
End: 2021-01-22
Payer: MEDICARE

## 2021-01-22 ENCOUNTER — ASOB RESULT (OUTPATIENT)
Age: 71
End: 2021-01-22

## 2021-01-22 VITALS
DIASTOLIC BLOOD PRESSURE: 80 MMHG | WEIGHT: 189 LBS | BODY MASS INDEX: 30.37 KG/M2 | SYSTOLIC BLOOD PRESSURE: 139 MMHG | HEIGHT: 66 IN

## 2021-01-22 DIAGNOSIS — R82.998 OTHER ABNORMAL FINDINGS IN URINE: ICD-10-CM

## 2021-01-22 PROCEDURE — 76830 TRANSVAGINAL US NON-OB: CPT

## 2021-01-22 PROCEDURE — 99214 OFFICE O/P EST MOD 30 MIN: CPT

## 2021-01-22 NOTE — HISTORY OF PRESENT ILLNESS
[FreeTextEntry1] : 70 yr old did not undress and  complained of vaginal itch and abd pelvic pain. Paps were normal  no need to do.  compete other exam done. Lungs clearm no thyromegaly.  Has external vaginal itching and Affirm done. Sent Lotrisone to pharmacy and had a sonogram done and normal but a lot of gas which is most likely her pain. Uterus fine and told her to take Mylicon for gas. Referral for mammography and bone density.

## 2021-01-22 NOTE — DISCUSSION/SUMMARY
[FreeTextEntry1] : 70 yr old and did exam and pelvic sonogram normal but too much gas to see ovaries. Told her to use Mylocon.  Mammo and bone density given. Sent Lotrisone for vaginal itching if affirm positive will treat. Urine showed leukocytes on clean catch and sent urine culture so if any  positive will call her and treat.

## 2021-01-24 ENCOUNTER — NON-APPOINTMENT (OUTPATIENT)
Age: 71
End: 2021-01-24

## 2021-01-24 LAB
BACTERIA UR CULT: NORMAL
CANDIDA VAG CYTO: NOT DETECTED
G VAGINALIS+PREV SP MTYP VAG QL MICRO: DETECTED
T VAGINALIS VAG QL WET PREP: NOT DETECTED

## 2021-02-08 ENCOUNTER — NON-APPOINTMENT (OUTPATIENT)
Age: 71
End: 2021-02-08

## 2021-03-02 ENCOUNTER — NON-APPOINTMENT (OUTPATIENT)
Age: 71
End: 2021-03-02

## 2021-03-02 ENCOUNTER — APPOINTMENT (OUTPATIENT)
Dept: INTERNAL MEDICINE | Facility: CLINIC | Age: 71
End: 2021-03-02
Payer: MEDICARE

## 2021-03-02 VITALS
OXYGEN SATURATION: 96 % | SYSTOLIC BLOOD PRESSURE: 134 MMHG | DIASTOLIC BLOOD PRESSURE: 79 MMHG | TEMPERATURE: 98.2 F | HEART RATE: 68 BPM | HEIGHT: 66 IN | WEIGHT: 192 LBS | BODY MASS INDEX: 30.86 KG/M2

## 2021-03-02 DIAGNOSIS — R10.13 EPIGASTRIC PAIN: ICD-10-CM

## 2021-03-02 DIAGNOSIS — M81.0 AGE-RELATED OSTEOPOROSIS W/OUT CURRENT PATHOLOGICAL FRACTURE: ICD-10-CM

## 2021-03-02 PROCEDURE — 99213 OFFICE O/P EST LOW 20 MIN: CPT

## 2021-03-02 NOTE — HISTORY OF PRESENT ILLNESS
[Family Member] : family member [FreeTextEntry1] : abd pain [de-identified] : now has upper abd pain--lower abd pain is better--vague historian--no triggers, no alarm GI symps--neg ct a+p reviewed\par seeing --pt doesn’t know name\par takes omeprazole daily for ?4 yrs

## 2021-03-02 NOTE — PHYSICAL EXAM
[Normal] : no jugular venous distention, supple, no lymphadenopathy and the thyroid was normal and there were no nodules present [Normal Bowel Sounds] : normal bowel sounds [No Hernias] : no hernias [de-identified] : non spec gen discomfort

## 2021-03-02 NOTE — ASSESSMENT
[FreeTextEntry1] : GI referral\par pt advised to observe symps and keep diary of exacerb and remissions

## 2021-03-19 RX ORDER — ALENDRONATE SODIUM 70 MG/1
70 TABLET ORAL
Qty: 1 | Refills: 4 | Status: ACTIVE | COMMUNITY
Start: 2021-03-19 | End: 1900-01-01

## 2021-03-21 ENCOUNTER — RX RENEWAL (OUTPATIENT)
Age: 71
End: 2021-03-21

## 2021-03-21 ENCOUNTER — NON-APPOINTMENT (OUTPATIENT)
Age: 71
End: 2021-03-21

## 2021-04-11 ENCOUNTER — RESULT CHARGE (OUTPATIENT)
Age: 71
End: 2021-04-11

## 2021-04-12 ENCOUNTER — NON-APPOINTMENT (OUTPATIENT)
Age: 71
End: 2021-04-12

## 2021-04-12 ENCOUNTER — APPOINTMENT (OUTPATIENT)
Dept: INTERNAL MEDICINE | Facility: CLINIC | Age: 71
End: 2021-04-12
Payer: MEDICARE

## 2021-04-12 VITALS
OXYGEN SATURATION: 98 % | HEIGHT: 66 IN | TEMPERATURE: 98 F | BODY MASS INDEX: 30.37 KG/M2 | SYSTOLIC BLOOD PRESSURE: 150 MMHG | DIASTOLIC BLOOD PRESSURE: 86 MMHG | HEART RATE: 71 BPM | WEIGHT: 189 LBS

## 2021-04-12 VITALS — DIASTOLIC BLOOD PRESSURE: 74 MMHG | SYSTOLIC BLOOD PRESSURE: 130 MMHG

## 2021-04-12 DIAGNOSIS — B96.89 ACUTE VAGINITIS: ICD-10-CM

## 2021-04-12 DIAGNOSIS — Z87.39 PERSONAL HISTORY OF OTHER DISEASES OF THE MUSCULOSKELETAL SYSTEM AND CONNECTIVE TISSUE: ICD-10-CM

## 2021-04-12 DIAGNOSIS — G44.52 NEW DAILY PERSISTENT HEADACHE (NDPH): ICD-10-CM

## 2021-04-12 DIAGNOSIS — Z87.42 PERSONAL HISTORY OF OTHER DISEASES OF THE FEMALE GENITAL TRACT: ICD-10-CM

## 2021-04-12 DIAGNOSIS — N95.2 POSTMENOPAUSAL ATROPHIC VAGINITIS: ICD-10-CM

## 2021-04-12 DIAGNOSIS — N76.0 ACUTE VAGINITIS: ICD-10-CM

## 2021-04-12 DIAGNOSIS — R82.998 OTHER ABNORMAL FINDINGS IN URINE: ICD-10-CM

## 2021-04-12 DIAGNOSIS — Z87.898 PERSONAL HISTORY OF OTHER SPECIFIED CONDITIONS: ICD-10-CM

## 2021-04-12 DIAGNOSIS — R10.10 UPPER ABDOMINAL PAIN, UNSPECIFIED: ICD-10-CM

## 2021-04-12 DIAGNOSIS — Z13.31 ENCOUNTER FOR SCREENING FOR DEPRESSION: ICD-10-CM

## 2021-04-12 PROCEDURE — 93000 ELECTROCARDIOGRAM COMPLETE: CPT

## 2021-04-12 PROCEDURE — 99214 OFFICE O/P EST MOD 30 MIN: CPT | Mod: 25

## 2021-04-12 PROCEDURE — 36415 COLL VENOUS BLD VENIPUNCTURE: CPT

## 2021-04-13 ENCOUNTER — APPOINTMENT (OUTPATIENT)
Dept: OBGYN | Facility: CLINIC | Age: 71
End: 2021-04-13
Payer: MEDICARE

## 2021-04-13 VITALS — WEIGHT: 189 LBS | BODY MASS INDEX: 30.37 KG/M2 | HEIGHT: 66 IN

## 2021-04-13 LAB
ABO + RH PNL BLD: NORMAL
ALBUMIN SERPL ELPH-MCNC: 4.4 G/DL
ALP BLD-CCNC: 95 U/L
ALT SERPL-CCNC: 9 U/L
ANION GAP SERPL CALC-SCNC: 12 MMOL/L
APPEARANCE: ABNORMAL
APTT BLD: 31.5 SEC
AST SERPL-CCNC: 16 U/L
BACTERIA: ABNORMAL
BASOPHILS # BLD AUTO: 0.07 K/UL
BASOPHILS NFR BLD AUTO: 0.9 %
BILIRUB SERPL-MCNC: 0.3 MG/DL
BILIRUBIN URINE: NEGATIVE
BLOOD URINE: NORMAL
BUN SERPL-MCNC: 11 MG/DL
CALCIUM SERPL-MCNC: 9.4 MG/DL
CHLORIDE SERPL-SCNC: 103 MMOL/L
CO2 SERPL-SCNC: 24 MMOL/L
COLOR: YELLOW
CREAT SERPL-MCNC: 0.68 MG/DL
EOSINOPHIL # BLD AUTO: 0.3 K/UL
EOSINOPHIL NFR BLD AUTO: 3.8 %
GLUCOSE QUALITATIVE U: NEGATIVE
GLUCOSE SERPL-MCNC: 104 MG/DL
HCT VFR BLD CALC: 39.4 %
HGB BLD-MCNC: 12.7 G/DL
HYALINE CASTS: 0 /LPF
IMM GRANULOCYTES NFR BLD AUTO: 0.1 %
INR PPP: 0.99 RATIO
KETONES URINE: NEGATIVE
LEUKOCYTE ESTERASE URINE: ABNORMAL
LYMPHOCYTES # BLD AUTO: 3.17 K/UL
LYMPHOCYTES NFR BLD AUTO: 40.5 %
MAN DIFF?: NORMAL
MCHC RBC-ENTMCNC: 29.8 PG
MCHC RBC-ENTMCNC: 32.2 GM/DL
MCV RBC AUTO: 92.5 FL
MICROSCOPIC-UA: NORMAL
MONOCYTES # BLD AUTO: 0.83 K/UL
MONOCYTES NFR BLD AUTO: 10.6 %
NEUTROPHILS # BLD AUTO: 3.44 K/UL
NEUTROPHILS NFR BLD AUTO: 44.1 %
NITRITE URINE: POSITIVE
PH URINE: 6.5
PLATELET # BLD AUTO: 305 K/UL
POTASSIUM SERPL-SCNC: 4.2 MMOL/L
PROT SERPL-MCNC: 7.3 G/DL
PROTEIN URINE: NORMAL
PT BLD: 11.8 SEC
RBC # BLD: 4.26 M/UL
RBC # FLD: 13.6 %
RED BLOOD CELLS URINE: 2 /HPF
SODIUM SERPL-SCNC: 139 MMOL/L
SPECIFIC GRAVITY URINE: 1.01
SQUAMOUS EPITHELIAL CELLS: 4 /HPF
UROBILINOGEN URINE: NORMAL
WBC # FLD AUTO: 7.82 K/UL
WHITE BLOOD CELLS URINE: 81 /HPF

## 2021-04-13 PROCEDURE — 99213 OFFICE O/P EST LOW 20 MIN: CPT

## 2021-04-13 NOTE — PHYSICAL EXAM
[Normal] : urethra [Labia Majora] : labia major [Labia Minora] : labia minora [Enlarged] : was enlarged [FreeTextEntry4] : nl scant discharge [FreeTextEntry7] : bladder tender to touch

## 2021-04-13 NOTE — HISTORY OF PRESENT ILLNESS
[No Pertinent Cardiac History] : no history of aortic stenosis, atrial fibrillation, coronary artery disease, recent myocardial infarction, or implantable device/pacemaker [No Pertinent Pulmonary History] : no history of asthma, COPD, sleep apnea, or smoking [No Adverse Anesthesia Reaction] : no adverse anesthesia reaction in self or family member [(Patient denies any chest pain, claudication, dyspnea on exertion, orthopnea, palpitations or syncope)] : Patient denies any chest pain, claudication, dyspnea on exertion, orthopnea, palpitations or syncope [Spouse] : spouse [Chronic Anticoagulation] : no chronic anticoagulation [Chronic Kidney Disease] : no chronic kidney disease [Diabetes] : no diabetes [FreeTextEntry1] : cervical neck cauterization [FreeTextEntry2] : 4/16/21 [FreeTextEntry3] : Dr Jc [FreeTextEntry4] : 70 yo female here for pre-op.\par fax#: [phone]

## 2021-04-13 NOTE — ADDENDUM
[FreeTextEntry1] : 4/13/21: pt ua +leuks and nitrities, urine culture pending; pt symptomatic today and saw gyn Dr Michaels this morning who started patient on Macrobid  mg x7 days, pt cleared for procedure Friday if symptoms resolved and culture shows response to Macrobid

## 2021-04-15 ENCOUNTER — TRANSCRIPTION ENCOUNTER (OUTPATIENT)
Age: 71
End: 2021-04-15

## 2021-04-15 LAB — BACTERIA UR CULT: ABNORMAL

## 2021-04-16 ENCOUNTER — OUTPATIENT (OUTPATIENT)
Dept: OUTPATIENT SERVICES | Facility: HOSPITAL | Age: 71
LOS: 1 days | Discharge: HOME | End: 2021-04-16
Payer: MEDICARE

## 2021-04-16 DIAGNOSIS — R73.03 PREDIABETES: ICD-10-CM

## 2021-04-16 DIAGNOSIS — I67.1 CEREBRAL ANEURYSM, NONRUPTURED: ICD-10-CM

## 2021-04-16 DIAGNOSIS — F41.8 OTHER SPECIFIED ANXIETY DISORDERS: ICD-10-CM

## 2021-04-16 DIAGNOSIS — I10 ESSENTIAL (PRIMARY) HYPERTENSION: ICD-10-CM

## 2021-04-16 DIAGNOSIS — E78.5 HYPERLIPIDEMIA, UNSPECIFIED: ICD-10-CM

## 2021-04-16 LAB — BACTERIA UR CULT: ABNORMAL

## 2021-04-16 RX ORDER — SODIUM CHLORIDE 9 MG/ML
1000 INJECTION INTRAMUSCULAR; INTRAVENOUS; SUBCUTANEOUS
Refills: 0 | Status: DISCONTINUED | OUTPATIENT
Start: 2021-04-16 | End: 2021-05-01

## 2021-04-16 NOTE — PROGRESS NOTE ADULT - SUBJECTIVE AND OBJECTIVE BOX
Neuroendovascular Pre-Op Note    HPI: 71 year old female w/ Osteoarthritis, HTN, Anxiety, GERD presents for elective cerebral angiography. Patient reports chronic neck and head pain    PMH:  PSH:  SocHx;  Allergies:    Medications:    Exam:  Gen: NAD, AAOx3.  HEENT: PERRL. EOMI. VF grossly intact.  Neck: FROM, nontender  Lungs: Clear b/l  Heart: S1, S2. NSR.  Abd: Soft, NT/ND. +BS  Exts: Pulses 2+ throughout  Neuro: CNs II-XII intact. 5/5 str x4 extremities. Sensation to LT intact. Following commands. Neuroendovascular Pre-Op Note    HPI: 71 year old female w/ Osteoarthritis, HTN, Anxiety, GERD presents for elective cerebral angiography. Patient reports chronic neck and head pain, but otherwise denies visual or hearing changes, extremity weakness or numbness, or gait changes. Outpatient imaging work-up suggestive of bilateral posterior communicating artery aneurysms. Patient takes Aspirin daily which she has held for the past few days in anticipation of today's procedure.    PMH: As above  PSH: h/o of shoulder surgery and cholecystectomy  SocHx: Denies smoking,  Allergies: NKDA    Medications: Aspirin 81mg, Atenolol 50mg daily, Fish Oil 1000mg, Calcium 600mg, Toviaz 4mg daily, Detrol 5mg,     Exam:  Gen: NAD, AAOx3.  HEENT: PERRL. EOMI. VF grossly intact.  Neck: FROM, nontender  Lungs: Clear b/l  Heart: S1, S2. NSR.  Abd: Soft, NT/ND. +BS  Exts: Pulses 2+ throughout  Neuro: CNs II-XII intact. 5/5 str x4 extremities. Sensation to LT intact. Following commands.

## 2021-04-16 NOTE — PROGRESS NOTE ADULT - PROBLEM SELECTOR PLAN 1
Plan for cerebral angiogram,  Consent in chart, all R/B/A discussed,  Outpatient clearance reviewed,  COVID PCR performed, negative  Plan for DC home after period of bedrest,  D/w Dr. Jc

## 2021-04-16 NOTE — PROGRESS NOTE ADULT - ASSESSMENT
71 year old female w/ HTN, Osteoarthritis, Anxiety, GERD with headaches and outpatient work-up with findings of bilateral PCOMM artery aneurysms.

## 2021-04-16 NOTE — BRIEF OPERATIVE NOTE - OPERATION/FINDINGS
Femoral cerebral angiogram performed under MAC sedation via right CFA using a 5Fr sheath. Five vessel angiogram performed with findings of a 4x5mm right bilobed wide neck PCOMM artery aneurysm, as well as a left 4x5mm PCOMM aneurysm. 3D spin performed for right PCOMM aneurysm however technical issues with machine precluded a spin on the left side. Left vertebral artery and bilateral ECA injections without pathology. Right groin closed with exoseal and manual compression, hemostasis obtained.    Full report to follow, d/w Dr. Jc

## 2021-04-17 PROCEDURE — C1760: CPT

## 2021-04-17 PROCEDURE — C1887: CPT

## 2021-04-17 PROCEDURE — C1894: CPT

## 2021-04-17 PROCEDURE — C1769: CPT

## 2021-04-17 PROCEDURE — 36224 PLACE CATH CAROTD ART: CPT | Mod: RT

## 2021-04-17 PROCEDURE — 36226 PLACE CATH VERTEBRAL ART: CPT | Mod: LT

## 2021-04-17 PROCEDURE — 37227: CPT | Mod: LT

## 2021-04-21 ENCOUNTER — RX RENEWAL (OUTPATIENT)
Age: 71
End: 2021-04-21

## 2021-04-29 ENCOUNTER — RX RENEWAL (OUTPATIENT)
Age: 71
End: 2021-04-29

## 2021-06-10 ENCOUNTER — NON-APPOINTMENT (OUTPATIENT)
Age: 71
End: 2021-06-10

## 2021-06-22 ENCOUNTER — APPOINTMENT (OUTPATIENT)
Dept: INTERNAL MEDICINE | Facility: CLINIC | Age: 71
End: 2021-06-22
Payer: MEDICARE

## 2021-06-22 VITALS
HEART RATE: 69 BPM | HEIGHT: 66 IN | SYSTOLIC BLOOD PRESSURE: 127 MMHG | TEMPERATURE: 98.9 F | WEIGHT: 188 LBS | OXYGEN SATURATION: 95 % | BODY MASS INDEX: 30.22 KG/M2 | DIASTOLIC BLOOD PRESSURE: 72 MMHG

## 2021-06-22 PROCEDURE — 99213 OFFICE O/P EST LOW 20 MIN: CPT

## 2021-06-22 NOTE — HISTORY OF PRESENT ILLNESS
[Spouse] : spouse [FreeTextEntry1] : cough for 3 days [de-identified] : no fever--cough for 3 days\par got covid vaccine\par pt states she is going for vascular nsx procedure on 7/9

## 2021-07-06 ENCOUNTER — NON-APPOINTMENT (OUTPATIENT)
Age: 71
End: 2021-07-06

## 2021-07-06 ENCOUNTER — APPOINTMENT (OUTPATIENT)
Dept: INTERNAL MEDICINE | Facility: CLINIC | Age: 71
End: 2021-07-06
Payer: MEDICARE

## 2021-07-06 VITALS
HEART RATE: 83 BPM | SYSTOLIC BLOOD PRESSURE: 130 MMHG | HEIGHT: 66 IN | OXYGEN SATURATION: 98 % | DIASTOLIC BLOOD PRESSURE: 72 MMHG | TEMPERATURE: 98.2 F | WEIGHT: 188 LBS | BODY MASS INDEX: 30.22 KG/M2

## 2021-07-06 DIAGNOSIS — M54.2 CERVICALGIA: ICD-10-CM

## 2021-07-06 DIAGNOSIS — J06.9 ACUTE UPPER RESPIRATORY INFECTION, UNSPECIFIED: ICD-10-CM

## 2021-07-06 DIAGNOSIS — R31.21 ASYMPTOMATIC MICROSCOPIC HEMATURIA: ICD-10-CM

## 2021-07-06 DIAGNOSIS — Z87.898 PERSONAL HISTORY OF OTHER SPECIFIED CONDITIONS: ICD-10-CM

## 2021-07-06 DIAGNOSIS — R82.998 OTHER ABNORMAL FINDINGS IN URINE: ICD-10-CM

## 2021-07-06 DIAGNOSIS — R39.9 UNSPECIFIED SYMPTOMS AND SIGNS INVOLVING THE GENITOURINARY SYSTEM: ICD-10-CM

## 2021-07-06 LAB
ALBUMIN SERPL ELPH-MCNC: 4.7 G/DL
ALP BLD-CCNC: 64 U/L
ALT SERPL-CCNC: 21 U/L
ANION GAP SERPL CALC-SCNC: 13 MMOL/L
APPEARANCE: ABNORMAL
APTT BLD: 30.3 SEC
AST SERPL-CCNC: 23 U/L
BACTERIA: ABNORMAL
BASOPHILS # BLD AUTO: 0.04 K/UL
BASOPHILS NFR BLD AUTO: 0.5 %
BILIRUB SERPL-MCNC: 0.5 MG/DL
BILIRUBIN URINE: NEGATIVE
BLOOD URINE: NEGATIVE
BUN SERPL-MCNC: 11 MG/DL
CALCIUM SERPL-MCNC: 9.9 MG/DL
CHLORIDE SERPL-SCNC: 103 MMOL/L
CO2 SERPL-SCNC: 25 MMOL/L
COLOR: YELLOW
CREAT SERPL-MCNC: 0.71 MG/DL
EOSINOPHIL # BLD AUTO: 0.45 K/UL
EOSINOPHIL NFR BLD AUTO: 6.1 %
GLUCOSE QUALITATIVE U: NEGATIVE
GLUCOSE SERPL-MCNC: 122 MG/DL
HCT VFR BLD CALC: 38.6 %
HGB BLD-MCNC: 12.8 G/DL
HYALINE CASTS: 1 /LPF
IMM GRANULOCYTES NFR BLD AUTO: 0.5 %
INR PPP: 0.96 RATIO
KETONES URINE: NEGATIVE
LEUKOCYTE ESTERASE URINE: ABNORMAL
LYMPHOCYTES # BLD AUTO: 1.98 K/UL
LYMPHOCYTES NFR BLD AUTO: 26.9 %
MAN DIFF?: NORMAL
MCHC RBC-ENTMCNC: 29.9 PG
MCHC RBC-ENTMCNC: 33.2 GM/DL
MCV RBC AUTO: 90.2 FL
MICROSCOPIC-UA: NORMAL
MONOCYTES # BLD AUTO: 0.75 K/UL
MONOCYTES NFR BLD AUTO: 10.2 %
NEUTROPHILS # BLD AUTO: 4.11 K/UL
NEUTROPHILS NFR BLD AUTO: 55.8 %
NITRITE URINE: NEGATIVE
PH URINE: 6.5
PLATELET # BLD AUTO: 293 K/UL
POTASSIUM SERPL-SCNC: 4.8 MMOL/L
PROT SERPL-MCNC: 7.5 G/DL
PROTEIN URINE: NORMAL
PT BLD: 11.3 SEC
RBC # BLD: 4.28 M/UL
RBC # FLD: 13.2 %
RED BLOOD CELLS URINE: 4 /HPF
SODIUM SERPL-SCNC: 140 MMOL/L
SPECIFIC GRAVITY URINE: 1.02
SQUAMOUS EPITHELIAL CELLS: 9 /HPF
UROBILINOGEN URINE: NORMAL
WBC # FLD AUTO: 7.37 K/UL
WHITE BLOOD CELLS URINE: 50 /HPF

## 2021-07-06 PROCEDURE — 93000 ELECTROCARDIOGRAM COMPLETE: CPT

## 2021-07-06 PROCEDURE — 36415 COLL VENOUS BLD VENIPUNCTURE: CPT

## 2021-07-06 PROCEDURE — 99214 OFFICE O/P EST MOD 30 MIN: CPT | Mod: 25

## 2021-07-06 RX ORDER — BENZONATATE 100 MG/1
100 CAPSULE ORAL 3 TIMES DAILY
Qty: 21 | Refills: 0 | Status: DISCONTINUED | COMMUNITY
Start: 2021-06-22 | End: 2021-07-06

## 2021-07-06 RX ORDER — OXYBUTYNIN CHLORIDE 5 MG/1
5 TABLET ORAL
Refills: 0 | Status: DISCONTINUED | COMMUNITY
End: 2021-07-06

## 2021-07-06 RX ORDER — AZITHROMYCIN 250 MG/1
250 TABLET, FILM COATED ORAL
Qty: 1 | Refills: 0 | Status: DISCONTINUED | COMMUNITY
Start: 2021-06-22 | End: 2021-07-06

## 2021-07-06 RX ORDER — TRIMETHOPRIM 100 MG/1
100 TABLET ORAL
Refills: 0 | Status: DISCONTINUED | COMMUNITY
End: 2021-07-06

## 2021-07-06 RX ORDER — METRONIDAZOLE 7.5 MG/G
0.75 GEL VAGINAL
Qty: 70 | Refills: 0 | Status: DISCONTINUED | COMMUNITY
Start: 2021-01-24 | End: 2021-07-06

## 2021-07-06 RX ORDER — CLOTRIMAZOLE AND BETAMETHASONE DIPROPIONATE 10; .5 MG/G; MG/G
1-0.05 CREAM TOPICAL
Qty: 1 | Refills: 4 | Status: DISCONTINUED | COMMUNITY
Start: 2021-01-22 | End: 2021-07-06

## 2021-07-06 RX ORDER — NITROFURANTOIN (MONOHYDRATE/MACROCRYSTALS) 25; 75 MG/1; MG/1
100 CAPSULE ORAL
Qty: 42 | Refills: 0 | Status: DISCONTINUED | COMMUNITY
Start: 2021-04-13 | End: 2021-07-06

## 2021-07-06 NOTE — HISTORY OF PRESENT ILLNESS
[No Pertinent Cardiac History] : no history of aortic stenosis, atrial fibrillation, coronary artery disease, recent myocardial infarction, or implantable device/pacemaker [No Pertinent Pulmonary History] : no history of asthma, COPD, sleep apnea, or smoking [No Adverse Anesthesia Reaction] : no adverse anesthesia reaction in self or family member [(Patient denies any chest pain, claudication, dyspnea on exertion, orthopnea, palpitations or syncope)] : Patient denies any chest pain, claudication, dyspnea on exertion, orthopnea, palpitations or syncope [Chronic Anticoagulation] : no chronic anticoagulation [Chronic Kidney Disease] : no chronic kidney disease [Diabetes] : no diabetes [FreeTextEntry1] : cervical neck cauterization? [FreeTextEntry2] : 07/08/21 [FreeTextEntry3] : Dr. Jc [FreeTextEntry4] : vascular nsx procedure\par unsure of exact procedure, came in without any paperwork just that needs ekg and bloodwork for clearance that is due tomorrow\par Fax #s:\par [phone] (Dr Jc)\par [phone] ([facility])\par accompanied by , s/w daughter on the phone

## 2021-07-06 NOTE — PLAN
[FreeTextEntry1] : educated patient that sending bw/urine out state but all may not be back by due date of 7/7 (tomorrow)\par pending normal bloodwork, patient cleared for proposed procedure

## 2021-07-08 ENCOUNTER — OUTPATIENT (OUTPATIENT)
Dept: OUTPATIENT SERVICES | Facility: HOSPITAL | Age: 71
LOS: 1 days | End: 2021-07-08
Payer: MEDICARE

## 2021-07-08 LAB
ABO + RH PNL BLD: NORMAL
ANION GAP SERPL CALC-SCNC: 11 MMOL/L — SIGNIFICANT CHANGE UP (ref 5–17)
APTT BLD: 29.1 SEC — SIGNIFICANT CHANGE UP (ref 27.5–35.5)
BASOPHILS # BLD AUTO: 0.05 K/UL — SIGNIFICANT CHANGE UP (ref 0–0.2)
BASOPHILS NFR BLD AUTO: 0.6 % — SIGNIFICANT CHANGE UP (ref 0–2)
BUN SERPL-MCNC: 12 MG/DL — SIGNIFICANT CHANGE UP (ref 7–23)
CALCIUM SERPL-MCNC: 9.2 MG/DL — SIGNIFICANT CHANGE UP (ref 8.4–10.5)
CHLORIDE SERPL-SCNC: 105 MMOL/L — SIGNIFICANT CHANGE UP (ref 96–108)
CO2 SERPL-SCNC: 27 MMOL/L — SIGNIFICANT CHANGE UP (ref 22–31)
CREAT SERPL-MCNC: 0.68 MG/DL — SIGNIFICANT CHANGE UP (ref 0.5–1.3)
EOSINOPHIL # BLD AUTO: 0.53 K/UL — HIGH (ref 0–0.5)
EOSINOPHIL NFR BLD AUTO: 6.7 % — HIGH (ref 0–6)
GLUCOSE SERPL-MCNC: 119 MG/DL — HIGH (ref 70–99)
HCT VFR BLD CALC: 36.5 % — SIGNIFICANT CHANGE UP (ref 34.5–45)
HGB BLD-MCNC: 12 G/DL — SIGNIFICANT CHANGE UP (ref 11.5–15.5)
IMM GRANULOCYTES NFR BLD AUTO: 0.3 % — SIGNIFICANT CHANGE UP (ref 0–1.5)
INR BLD: 0.96 — SIGNIFICANT CHANGE UP (ref 0.88–1.16)
LYMPHOCYTES # BLD AUTO: 2.05 K/UL — SIGNIFICANT CHANGE UP (ref 1–3.3)
LYMPHOCYTES # BLD AUTO: 26 % — SIGNIFICANT CHANGE UP (ref 13–44)
MCHC RBC-ENTMCNC: 30.1 PG — SIGNIFICANT CHANGE UP (ref 27–34)
MCHC RBC-ENTMCNC: 32.9 GM/DL — SIGNIFICANT CHANGE UP (ref 32–36)
MCV RBC AUTO: 91.5 FL — SIGNIFICANT CHANGE UP (ref 80–100)
MONOCYTES # BLD AUTO: 0.81 K/UL — SIGNIFICANT CHANGE UP (ref 0–0.9)
MONOCYTES NFR BLD AUTO: 10.3 % — SIGNIFICANT CHANGE UP (ref 2–14)
NEUTROPHILS # BLD AUTO: 4.41 K/UL — SIGNIFICANT CHANGE UP (ref 1.8–7.4)
NEUTROPHILS NFR BLD AUTO: 56.1 % — SIGNIFICANT CHANGE UP (ref 43–77)
NRBC # BLD: 0 /100 WBCS — SIGNIFICANT CHANGE UP (ref 0–0)
PA ADP PRP-ACNC: 161 PRU — LOW (ref 194–418)
PLATELET # BLD AUTO: 241 K/UL — SIGNIFICANT CHANGE UP (ref 150–400)
PLATELET RESPONSE ASPIRIN RESULT: 562 ARU — SIGNIFICANT CHANGE UP (ref 350–700)
POTASSIUM SERPL-MCNC: 4.1 MMOL/L — SIGNIFICANT CHANGE UP (ref 3.5–5.3)
POTASSIUM SERPL-SCNC: 4.1 MMOL/L — SIGNIFICANT CHANGE UP (ref 3.5–5.3)
PROTHROM AB SERPL-ACNC: 11.5 SEC — SIGNIFICANT CHANGE UP (ref 10.6–13.6)
RBC # BLD: 3.99 M/UL — SIGNIFICANT CHANGE UP (ref 3.8–5.2)
RBC # FLD: 13.1 % — SIGNIFICANT CHANGE UP (ref 10.3–14.5)
SODIUM SERPL-SCNC: 143 MMOL/L — SIGNIFICANT CHANGE UP (ref 135–145)
WBC # BLD: 7.87 K/UL — SIGNIFICANT CHANGE UP (ref 3.8–10.5)
WBC # FLD AUTO: 7.87 K/UL — SIGNIFICANT CHANGE UP (ref 3.8–10.5)

## 2021-07-08 PROCEDURE — 80048 BASIC METABOLIC PNL TOTAL CA: CPT

## 2021-07-08 PROCEDURE — 85610 PROTHROMBIN TIME: CPT

## 2021-07-08 PROCEDURE — 85730 THROMBOPLASTIN TIME PARTIAL: CPT

## 2021-07-08 PROCEDURE — 85576 BLOOD PLATELET AGGREGATION: CPT

## 2021-07-08 PROCEDURE — 85025 COMPLETE CBC W/AUTO DIFF WBC: CPT

## 2021-07-08 PROCEDURE — 36415 COLL VENOUS BLD VENIPUNCTURE: CPT

## 2021-07-08 RX ORDER — CLOPIDOGREL BISULFATE 75 MG/1
150 TABLET, FILM COATED ORAL ONCE
Refills: 0 | Status: COMPLETED | OUTPATIENT
Start: 2021-07-08 | End: 2021-07-08

## 2021-07-08 RX ORDER — ASPIRIN/CALCIUM CARB/MAGNESIUM 324 MG
325 TABLET ORAL ONCE
Refills: 0 | Status: COMPLETED | OUTPATIENT
Start: 2021-07-08 | End: 2021-07-08

## 2021-07-08 RX ORDER — CHLORHEXIDINE GLUCONATE 213 G/1000ML
1 SOLUTION TOPICAL ONCE
Refills: 0 | Status: DISCONTINUED | OUTPATIENT
Start: 2021-07-08 | End: 2021-07-22

## 2021-07-08 RX ADMIN — Medication 325 MILLIGRAM(S): at 09:51

## 2021-07-08 RX ADMIN — CLOPIDOGREL BISULFATE 150 MILLIGRAM(S): 75 TABLET, FILM COATED ORAL at 09:33

## 2021-07-12 DIAGNOSIS — N39.0 URINARY TRACT INFECTION, SITE NOT SPECIFIED: ICD-10-CM

## 2021-07-12 LAB — BACTERIA UR CULT: ABNORMAL

## 2021-07-15 DIAGNOSIS — I67.1 CEREBRAL ANEURYSM, NONRUPTURED: ICD-10-CM

## 2021-08-10 ENCOUNTER — NON-APPOINTMENT (OUTPATIENT)
Age: 71
End: 2021-08-10

## 2021-08-10 ENCOUNTER — APPOINTMENT (OUTPATIENT)
Dept: INTERNAL MEDICINE | Facility: CLINIC | Age: 71
End: 2021-08-10
Payer: MEDICARE

## 2021-08-10 VITALS
BODY MASS INDEX: 30.37 KG/M2 | OXYGEN SATURATION: 97 % | HEART RATE: 61 BPM | TEMPERATURE: 97 F | SYSTOLIC BLOOD PRESSURE: 134 MMHG | WEIGHT: 189 LBS | RESPIRATION RATE: 16 BRPM | DIASTOLIC BLOOD PRESSURE: 74 MMHG | HEIGHT: 66 IN

## 2021-08-10 DIAGNOSIS — Z87.891 PERSONAL HISTORY OF NICOTINE DEPENDENCE: ICD-10-CM

## 2021-08-10 PROCEDURE — 93000 ELECTROCARDIOGRAM COMPLETE: CPT

## 2021-08-10 PROCEDURE — 99214 OFFICE O/P EST MOD 30 MIN: CPT | Mod: 25

## 2021-08-10 RX ORDER — ATORVASTATIN CALCIUM 40 MG/1
40 TABLET, FILM COATED ORAL
Qty: 90 | Refills: 2 | Status: DISCONTINUED | COMMUNITY
Start: 2019-11-22 | End: 2021-08-10

## 2021-08-16 NOTE — HISTORY OF PRESENT ILLNESS
[No Pertinent Pulmonary History] : no history of asthma, COPD, sleep apnea, or smoking [No Adverse Anesthesia Reaction] : no adverse anesthesia reaction in self or family member [(Patient denies any chest pain, claudication, dyspnea on exertion, orthopnea, palpitations or syncope)] : Patient denies any chest pain, claudication, dyspnea on exertion, orthopnea, palpitations or syncope [Moderate (4-6 METs)] : Moderate (4-6 METs) [No Pertinent Cardiac History] : no history of aortic stenosis, atrial fibrillation, coronary artery disease, recent myocardial infarction, or implantable device/pacemaker [Chronic Anticoagulation] : no chronic anticoagulation [Chronic Kidney Disease] : no chronic kidney disease [FreeTextEntry1] : vascular procedure [FreeTextEntry2] : 8/18/21 [FreeTextEntry3] : [facility]--fax  [phone], [phone] [FreeTextEntry4] : pt presents with no paperwork for preop clearance'\par states ecg, labs needed\par acc to pts son, a vascular procedure is being done\par pt seen with  [FreeTextEntry7] : echo 11/19 nlm

## 2021-08-17 ENCOUNTER — TRANSCRIPTION ENCOUNTER (OUTPATIENT)
Age: 71
End: 2021-08-17

## 2021-08-18 ENCOUNTER — TRANSCRIPTION ENCOUNTER (OUTPATIENT)
Age: 71
End: 2021-08-18

## 2021-08-18 ENCOUNTER — INPATIENT (INPATIENT)
Facility: HOSPITAL | Age: 71
LOS: 4 days | Discharge: HOME | DRG: 26 | End: 2021-08-23
Attending: NEUROLOGICAL SURGERY | Admitting: NEUROLOGICAL SURGERY
Payer: MEDICARE

## 2021-08-18 VITALS
HEART RATE: 69 BPM | DIASTOLIC BLOOD PRESSURE: 56 MMHG | OXYGEN SATURATION: 96 % | RESPIRATION RATE: 13 BRPM | SYSTOLIC BLOOD PRESSURE: 137 MMHG

## 2021-08-18 DIAGNOSIS — Z98.890 OTHER SPECIFIED POSTPROCEDURAL STATES: Chronic | ICD-10-CM

## 2021-08-18 DIAGNOSIS — M81.0 AGE-RELATED OSTEOPOROSIS WITHOUT CURRENT PATHOLOGICAL FRACTURE: ICD-10-CM

## 2021-08-18 DIAGNOSIS — N32.81 OVERACTIVE BLADDER: ICD-10-CM

## 2021-08-18 DIAGNOSIS — I67.1 CEREBRAL ANEURYSM, NONRUPTURED: ICD-10-CM

## 2021-08-18 DIAGNOSIS — I10 ESSENTIAL (PRIMARY) HYPERTENSION: ICD-10-CM

## 2021-08-18 DIAGNOSIS — Z90.49 ACQUIRED ABSENCE OF OTHER SPECIFIED PARTS OF DIGESTIVE TRACT: Chronic | ICD-10-CM

## 2021-08-18 DIAGNOSIS — K21.9 GASTRO-ESOPHAGEAL REFLUX DISEASE WITHOUT ESOPHAGITIS: ICD-10-CM

## 2021-08-18 LAB
ANION GAP SERPL CALC-SCNC: 10 MMOL/L — SIGNIFICANT CHANGE UP (ref 5–17)
APPEARANCE UR: ABNORMAL
APPEARANCE UR: ABNORMAL
BACTERIA # UR AUTO: ABNORMAL /HPF
BACTERIA # UR AUTO: PRESENT /HPF
BILIRUB UR-MCNC: NEGATIVE — SIGNIFICANT CHANGE UP
BILIRUB UR-MCNC: NEGATIVE — SIGNIFICANT CHANGE UP
BUN SERPL-MCNC: 11 MG/DL — SIGNIFICANT CHANGE UP (ref 7–23)
CALCIUM SERPL-MCNC: 8.8 MG/DL — SIGNIFICANT CHANGE UP (ref 8.4–10.5)
CHLORIDE SERPL-SCNC: 104 MMOL/L — SIGNIFICANT CHANGE UP (ref 96–108)
CO2 SERPL-SCNC: 26 MMOL/L — SIGNIFICANT CHANGE UP (ref 22–31)
COLOR SPEC: YELLOW — SIGNIFICANT CHANGE UP
COLOR SPEC: YELLOW — SIGNIFICANT CHANGE UP
COMMENT - URINE: SIGNIFICANT CHANGE UP
COMMENT - URINE: SIGNIFICANT CHANGE UP
CREAT SERPL-MCNC: 0.69 MG/DL — SIGNIFICANT CHANGE UP (ref 0.5–1.3)
DIFF PNL FLD: ABNORMAL
DIFF PNL FLD: ABNORMAL
EPI CELLS # UR: SIGNIFICANT CHANGE UP /HPF
EPI CELLS # UR: SIGNIFICANT CHANGE UP /HPF (ref 0–5)
GLUCOSE BLDC GLUCOMTR-MCNC: 114 MG/DL — HIGH (ref 70–99)
GLUCOSE BLDC GLUCOMTR-MCNC: 147 MG/DL — HIGH (ref 70–99)
GLUCOSE SERPL-MCNC: 113 MG/DL — HIGH (ref 70–99)
GLUCOSE UR QL: NEGATIVE — SIGNIFICANT CHANGE UP
GLUCOSE UR QL: NEGATIVE — SIGNIFICANT CHANGE UP
HCT VFR BLD CALC: 34 % — LOW (ref 34.5–45)
HGB BLD-MCNC: 11 G/DL — LOW (ref 11.5–15.5)
KETONES UR-MCNC: NEGATIVE — SIGNIFICANT CHANGE UP
KETONES UR-MCNC: NEGATIVE — SIGNIFICANT CHANGE UP
LEUKOCYTE ESTERASE UR-ACNC: ABNORMAL
LEUKOCYTE ESTERASE UR-ACNC: ABNORMAL
MAGNESIUM SERPL-MCNC: 1.9 MG/DL — SIGNIFICANT CHANGE UP (ref 1.6–2.6)
MCHC RBC-ENTMCNC: 29.3 PG — SIGNIFICANT CHANGE UP (ref 27–34)
MCHC RBC-ENTMCNC: 32.4 GM/DL — SIGNIFICANT CHANGE UP (ref 32–36)
MCV RBC AUTO: 90.4 FL — SIGNIFICANT CHANGE UP (ref 80–100)
NITRITE UR-MCNC: NEGATIVE — SIGNIFICANT CHANGE UP
NITRITE UR-MCNC: POSITIVE
NRBC # BLD: 0 /100 WBCS — SIGNIFICANT CHANGE UP (ref 0–0)
PH UR: 6.5 — SIGNIFICANT CHANGE UP (ref 5–8)
PH UR: 6.5 — SIGNIFICANT CHANGE UP (ref 5–8)
PHOSPHATE SERPL-MCNC: 3.4 MG/DL — SIGNIFICANT CHANGE UP (ref 2.5–4.5)
PLATELET # BLD AUTO: 224 K/UL — SIGNIFICANT CHANGE UP (ref 150–400)
POTASSIUM SERPL-MCNC: 3.9 MMOL/L — SIGNIFICANT CHANGE UP (ref 3.5–5.3)
POTASSIUM SERPL-SCNC: 3.9 MMOL/L — SIGNIFICANT CHANGE UP (ref 3.5–5.3)
PROT UR-MCNC: 30 MG/DL
PROT UR-MCNC: ABNORMAL MG/DL
RBC # BLD: 3.76 M/UL — LOW (ref 3.8–5.2)
RBC # FLD: 13.8 % — SIGNIFICANT CHANGE UP (ref 10.3–14.5)
RBC CASTS # UR COMP ASSIST: < 5 /HPF — SIGNIFICANT CHANGE UP
RBC CASTS # UR COMP ASSIST: ABNORMAL /HPF
SODIUM SERPL-SCNC: 140 MMOL/L — SIGNIFICANT CHANGE UP (ref 135–145)
SP GR SPEC: 1.02 — SIGNIFICANT CHANGE UP (ref 1–1.03)
SP GR SPEC: <=1.005 — SIGNIFICANT CHANGE UP (ref 1–1.03)
UROBILINOGEN FLD QL: 0.2 E.U./DL — SIGNIFICANT CHANGE UP
UROBILINOGEN FLD QL: 0.2 E.U./DL — SIGNIFICANT CHANGE UP
WBC # BLD: 10.45 K/UL — SIGNIFICANT CHANGE UP (ref 3.8–10.5)
WBC # FLD AUTO: 10.45 K/UL — SIGNIFICANT CHANGE UP (ref 3.8–10.5)
WBC UR QL: ABNORMAL /HPF
WBC UR QL: ABNORMAL /HPF

## 2021-08-18 PROCEDURE — 99231 SBSQ HOSP IP/OBS SF/LOW 25: CPT

## 2021-08-18 PROCEDURE — 99223 1ST HOSP IP/OBS HIGH 75: CPT

## 2021-08-18 RX ORDER — AMLODIPINE BESYLATE 2.5 MG/1
1 TABLET ORAL
Qty: 0 | Refills: 0 | DISCHARGE

## 2021-08-18 RX ORDER — DEXTROSE 50 % IN WATER 50 %
12.5 SYRINGE (ML) INTRAVENOUS ONCE
Refills: 0 | Status: DISCONTINUED | OUTPATIENT
Start: 2021-08-18 | End: 2021-08-18

## 2021-08-18 RX ORDER — DIAZEPAM 5 MG
5 TABLET ORAL ONCE
Refills: 0 | Status: DISCONTINUED | OUTPATIENT
Start: 2021-08-18 | End: 2021-08-18

## 2021-08-18 RX ORDER — TRAMADOL HYDROCHLORIDE 50 MG/1
50 TABLET ORAL ONCE
Refills: 0 | Status: DISCONTINUED | OUTPATIENT
Start: 2021-08-18 | End: 2021-08-18

## 2021-08-18 RX ORDER — CLOPIDOGREL BISULFATE 75 MG/1
75 TABLET, FILM COATED ORAL DAILY
Refills: 0 | Status: DISCONTINUED | OUTPATIENT
Start: 2021-08-18 | End: 2021-08-18

## 2021-08-18 RX ORDER — DEXTROSE 50 % IN WATER 50 %
15 SYRINGE (ML) INTRAVENOUS ONCE
Refills: 0 | Status: DISCONTINUED | OUTPATIENT
Start: 2021-08-18 | End: 2021-08-18

## 2021-08-18 RX ORDER — ATENOLOL 25 MG/1
50 TABLET ORAL DAILY
Refills: 0 | Status: DISCONTINUED | OUTPATIENT
Start: 2021-08-18 | End: 2021-08-23

## 2021-08-18 RX ORDER — SENNA PLUS 8.6 MG/1
2 TABLET ORAL AT BEDTIME
Refills: 0 | Status: DISCONTINUED | OUTPATIENT
Start: 2021-08-18 | End: 2021-08-22

## 2021-08-18 RX ORDER — CLOPIDOGREL BISULFATE 75 MG/1
75 TABLET, FILM COATED ORAL EVERY 24 HOURS
Refills: 0 | Status: DISCONTINUED | OUTPATIENT
Start: 2021-08-19 | End: 2021-08-19

## 2021-08-18 RX ORDER — ACETAMINOPHEN 500 MG
650 TABLET ORAL EVERY 6 HOURS
Refills: 0 | Status: DISCONTINUED | OUTPATIENT
Start: 2021-08-18 | End: 2021-08-23

## 2021-08-18 RX ORDER — SODIUM CHLORIDE 9 MG/ML
1000 INJECTION, SOLUTION INTRAVENOUS
Refills: 0 | Status: DISCONTINUED | OUTPATIENT
Start: 2021-08-18 | End: 2021-08-18

## 2021-08-18 RX ORDER — OXYCODONE HYDROCHLORIDE 5 MG/1
5 TABLET ORAL ONCE
Refills: 0 | Status: DISCONTINUED | OUTPATIENT
Start: 2021-08-18 | End: 2021-08-18

## 2021-08-18 RX ORDER — INSULIN LISPRO 100/ML
VIAL (ML) SUBCUTANEOUS
Refills: 0 | Status: DISCONTINUED | OUTPATIENT
Start: 2021-08-18 | End: 2021-08-22

## 2021-08-18 RX ORDER — ASPIRIN/CALCIUM CARB/MAGNESIUM 324 MG
81 TABLET ORAL DAILY
Refills: 0 | Status: DISCONTINUED | OUTPATIENT
Start: 2021-08-18 | End: 2021-08-18

## 2021-08-18 RX ORDER — FENTANYL CITRATE 50 UG/ML
12.5 INJECTION INTRAVENOUS ONCE
Refills: 0 | Status: DISCONTINUED | OUTPATIENT
Start: 2021-08-18 | End: 2021-08-18

## 2021-08-18 RX ORDER — AMLODIPINE BESYLATE 2.5 MG/1
10 TABLET ORAL DAILY
Refills: 0 | Status: DISCONTINUED | OUTPATIENT
Start: 2021-08-18 | End: 2021-08-23

## 2021-08-18 RX ORDER — GLUCAGON INJECTION, SOLUTION 0.5 MG/.1ML
1 INJECTION, SOLUTION SUBCUTANEOUS ONCE
Refills: 0 | Status: DISCONTINUED | OUTPATIENT
Start: 2021-08-18 | End: 2021-08-18

## 2021-08-18 RX ORDER — DEXTROSE 50 % IN WATER 50 %
25 SYRINGE (ML) INTRAVENOUS ONCE
Refills: 0 | Status: DISCONTINUED | OUTPATIENT
Start: 2021-08-18 | End: 2021-08-18

## 2021-08-18 RX ORDER — SODIUM CHLORIDE 9 MG/ML
1000 INJECTION INTRAMUSCULAR; INTRAVENOUS; SUBCUTANEOUS
Refills: 0 | Status: DISCONTINUED | OUTPATIENT
Start: 2021-08-18 | End: 2021-08-19

## 2021-08-18 RX ORDER — ASPIRIN/CALCIUM CARB/MAGNESIUM 324 MG
81 TABLET ORAL EVERY 24 HOURS
Refills: 0 | Status: DISCONTINUED | OUTPATIENT
Start: 2021-08-19 | End: 2021-08-19

## 2021-08-18 RX ORDER — ATENOLOL 25 MG/1
1 TABLET ORAL
Qty: 0 | Refills: 0 | DISCHARGE

## 2021-08-18 RX ORDER — CHLORHEXIDINE GLUCONATE 213 G/1000ML
1 SOLUTION TOPICAL ONCE
Refills: 0 | Status: DISCONTINUED | OUTPATIENT
Start: 2021-08-18 | End: 2021-08-23

## 2021-08-18 RX ADMIN — TRAMADOL HYDROCHLORIDE 50 MILLIGRAM(S): 50 TABLET ORAL at 19:56

## 2021-08-18 RX ADMIN — SENNA PLUS 2 TABLET(S): 8.6 TABLET ORAL at 22:14

## 2021-08-18 RX ADMIN — Medication 5 MILLIGRAM(S): at 20:07

## 2021-08-18 RX ADMIN — OXYCODONE HYDROCHLORIDE 5 MILLIGRAM(S): 5 TABLET ORAL at 23:15

## 2021-08-18 RX ADMIN — FENTANYL CITRATE 12.5 MICROGRAM(S): 50 INJECTION INTRAVENOUS at 19:10

## 2021-08-18 RX ADMIN — OXYCODONE HYDROCHLORIDE 5 MILLIGRAM(S): 5 TABLET ORAL at 22:14

## 2021-08-18 NOTE — CHART NOTE - NSCHARTNOTEFT_GEN_A_CORE
Neurosurgical Indications for Screening Dopplers on Admission:       1) Known hypercoagulation disorder (h/o VTE, thrombophilia, HIT, etc.)   2) Admitted from prolonged stay from another institution (straight forward ER transfers not included)  3) Presenting with significant leg immobility   4) Presenting with signs and symptoms of VTE?    5) With significant critical illness, Including "found down" for unknown period of time in HPI  6) With significant neurotrauma (TBI, SCI / TLS spine fractures)   7) Who are comatose   8) With known malignancy (e.g. glioblastoma multiforme, meningioma, etc.). Excludes IA chemo 23hr observation stays  9) On hemodialysis   10) Who have received platelet transfusion or antithrombotic reversal agents recently   11) Who have had recent major orthopedic surgery          Screening dopplers indicated?   Y _   N x    DVT Prophylaxis:   x SCD's   _ chemoprophylaxis

## 2021-08-18 NOTE — H&P ADULT - NSICDXPASTMEDICALHX_GEN_ALL_CORE_FT
PAST MEDICAL HISTORY:  Anxiety     GERD (gastroesophageal reflux disease)     HTN (hypertension)     Osteoporosis     Overactive bladder

## 2021-08-18 NOTE — BRIEF OPERATIVE NOTE - NSICDXBRIEFPOSTOP_GEN_ALL_CORE_FT
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

## 2021-08-18 NOTE — BRIEF OPERATIVE NOTE - NSICDXBRIEFPROCEDURE_GEN_ALL_CORE_FT
PROCEDURES:  Angiogram, cerebral, with embolization 18-Aug-2021 17:59:05 Right ICA Pipeline Stent Cody Rudolph

## 2021-08-18 NOTE — H&P ADULT - NSHPPHYSICALEXAM_GEN_ALL_CORE
Gen: NAD, AAOx3.  HEENT: PERRL. EOMI. VF grossly intact.  Neck: FROM, nontender  Lungs: Clear b/l  Heart: S1, S2. NSR.  Abd: Soft, NT/ND. +BS  Exts: Pulses 2+ throughout  Neuro: CNs II-XII intact. 5/5 str x4 extremities. Sensation to LT intact. Following commands. Gait WNL.

## 2021-08-18 NOTE — H&P ADULT - ASSESSMENT
71 year old female w/ HTN, Osteoarthritis, Anxiety, GERD, overactive bladder with headaches, found to have b/l PCOMM aneurysms on cerebral angiogram 04/2021 presenting today for elective Flow diverter Pipeline stent.

## 2021-08-18 NOTE — PROGRESS NOTE ADULT - SUBJECTIVE AND OBJECTIVE BOX
NEUROCRITICAL CARE PROGRESS NOTE    POD# 0 S/P femoral cerebral angiogram for R ICA pipeline stent for PComm aneurysm    S: Pt seen and examined in PACU. Reports mild tenderness to R groin and moderate back pain.       T(C): --  HR: 60 (08-18-21 @ 19:35) (59 - 69)  BP: 128/59 (08-18-21 @ 19:35) (127/59 - 137/56)  RR: 12 (08-18-21 @ 19:35) (12 - 19)  SpO2: 98% (08-18-21 @ 19:35) (95% - 98%)      08-18-21 @ 07:01  -  08-18-21 @ 20:20  --------------------------------------------------------  IN: 1000 mL / OUT: 415 mL / NET: 585 mL        acetaminophen   Tablet .. 650 milliGRAM(s) Oral every 6 hours PRN  amLODIPine   Tablet 10 milliGRAM(s) Oral daily  ATENolol  Tablet 50 milliGRAM(s) Oral daily  calcium carbonate 1250 mG  + Vitamin D (OsCal 500 + D) 1 Tablet(s) Oral three times a day  chlorhexidine 4% Liquid 1 Application(s) Topical Once  insulin lispro (ADMELOG) corrective regimen sliding scale   SubCutaneous Before meals and at bedtime  sodium chloride 0.9%. 1000 milliLiter(s) IV Continuous <Continuous>      RADIOLOGY:     Exam:  GEN: laying in bed, appears well, NAD  NEURO: AOx3. FC, OE spont, speech intact, face symmetric. CNII-XII intact. PERRL, EOMI. No pronator drift. MAEx4. 5/5 strength throughout (RLE not tested 2/2 groin access). SILT  CV: RRR +S1/S2  PULM: CTAB  GI: Abd soft, NT/ND  EXT: ext warm, dry, nontender  WOUND/DRAINS: R groin with dressing in place, blood tinged (redressed). DP/PT pulses 2+ b/l        Assessment: 71 year old female w/ HTN, Osteoarthritis, Anxiety, GERD, overactive bladder with headaches, found to have b/l PCOMM aneurysms on cerebral angiogram 04/2021 now s/p elective Flow diverter Pipeline stent (8/18/21).      Plan:  NEURO:  - neuro/vitals/groin/pulse checks  - flat x 6 hrs  - pain control PRN, valium for back spasm  - cont ASA 81, Plavix 75; Accumetrics 8/19 at 10AM  - consider safeguard for R groin oozing if persistent    CARDIOVASCULAR:  - normotensive SBP goal  - continue home meds: atenolol, amlodipine    PULMONARY:  - stable on RA    RENAL:  - IVF until adequate PO intake  - richmond in place, DC in AM    GI:  - ADAT  - bowel regimen  - PPI on steroids    HEME:  - SCDs for DVT ppx (L leg only x 6 hrs)    ID:  - f/u urine culture    ENDO:  - ISS    DISPO:  - full code, ICU  HOME TOMORROW

## 2021-08-18 NOTE — BRIEF OPERATIVE NOTE - OPERATION/FINDINGS
Femoral cerebral angiogram performed under [facility] via right CFA using a Neuronmax 6Fr as a sheath. Bilateral angiogram performed with left ICA injection demonstrating a left PCOMM aneurysm, wide neck. 3D spin performed. Right ICA injection with 3D spin again demonstrates a bilobed right PCOMM aneurysm. A pipeline flow diverter stent deployed across the right ICA. 5000u of Heparin given with . No vessel occlusion post treatment. RIght groin closed with perclose and safeguard applied. Patient remained hemodynamically and neurologically stable.    Full report to follow, d/w Dr. Jc.

## 2021-08-18 NOTE — H&P ADULT - PROBLEM SELECTOR PLAN 1
Plan for pipeline flow diverter stent,  Consent in chart, all R/B/A discussed,  Outpatient clearance reviewed,  P2y12 and Aspirin Assay reviewed pre-op,  Admit to ICU overnight,  D/w Dr. Jc

## 2021-08-18 NOTE — H&P ADULT - HISTORY OF PRESENT ILLNESS
71 year old female w/ Osteoarthritis, HTN, Anxiety, GERD presents for elective cerebral angiography. Patient reports chronic neck and head pain, but otherwise denies visual or hearing changes, extremity weakness or numbness, or gait changes. 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 She presents today for Pipeline stent placement for likely right PCOMM aneurysm. She has been on Aspirin and Plavix in anticipation of this procedure.

## 2021-08-18 NOTE — PROGRESS NOTE ADULT - SUBJECTIVE AND OBJECTIVE BOX
NEUROSURGERY POST OP NOTE:    POD# 0 S/P femoral cerebral angiogram for R ICA pipeline stent for PComm aneurysm    S: Pt seen and examined in PACU. Reports mild tenderness to R groin and moderate back pain.       T(C): --  HR: 60 (08-18-21 @ 19:35) (59 - 69)  BP: 128/59 (08-18-21 @ 19:35) (127/59 - 137/56)  RR: 12 (08-18-21 @ 19:35) (12 - 19)  SpO2: 98% (08-18-21 @ 19:35) (95% - 98%)      08-18-21 @ 07:01  -  08-18-21 @ 20:20  --------------------------------------------------------  IN: 1000 mL / OUT: 415 mL / NET: 585 mL        acetaminophen   Tablet .. 650 milliGRAM(s) Oral every 6 hours PRN  amLODIPine   Tablet 10 milliGRAM(s) Oral daily  ATENolol  Tablet 50 milliGRAM(s) Oral daily  calcium carbonate 1250 mG  + Vitamin D (OsCal 500 + D) 1 Tablet(s) Oral three times a day  chlorhexidine 4% Liquid 1 Application(s) Topical Once  insulin lispro (ADMELOG) corrective regimen sliding scale   SubCutaneous Before meals and at bedtime  sodium chloride 0.9%. 1000 milliLiter(s) IV Continuous <Continuous>      RADIOLOGY:     Exam:  GEN: laying in bed, appears well, NAD  NEURO: AOx3. FC, OE spont, speech intact, face symmetric. CNII-XII intact. PERRL, EOMI. No pronator drift. MAEx4. 5/5 strength throughout (RLE not tested 2/2 groin access). SILT  CV: RRR +S1/S2  PULM: CTAB  GI: Abd soft, NT/ND  EXT: ext warm, dry, nontender  WOUND/DRAINS: R groin with dressing in place, blood tinged (redressed). DP/PT pulses 2+ b/l        Assessment: 71 year old female w/ HTN, Osteoarthritis, Anxiety, GERD, overactive bladder with headaches, found to have b/l PCOMM aneurysms on cerebral angiogram 04/2021 now s/p elective Flow diverter Pipeline stent (8/18/21).      Plan:  NEURO:  - neuro/vitals/groin/pulse checks  - flat x 6 hrs  - pain control PRN, valium for back spasm  - cont ASA 81, Plavix 75; Accumetrics 8/19 at 10AM  - consider safeguard for R groin oozing if persistent    CARDIOVASCULAR:  - normotensive SBP goal  - continue home meds: atenolol, amlodipine    PULMONARY:  - stable on RA    RENAL:  - IVF until adequate PO intake  - richmond in place, DC in AM    GI:  - ADAT  - bowel regimen  - PPI on steroids    HEME:  - SCDs for DVT ppx (L leg only x 6 hrs)    ID:  - f/u urine culture    ENDO:  - ISS    DISPO:  - full code, ICU    Discussed with Dr. Jc        Assessment:  Present when checked    []  GCS  E   V  M     Heart Failure: []Acute, [] acute on chronic , []chronic  Heart Failure:  [] Diastolic (HFpEF), [] Systolic (HFrEF), []Combined (HFpEF and HFrEF), [] RHF, [] Pulm HTN, [] Other    [] TOM, [] ATN, [] AIN, [] other  [] CKD1, [] CKD2, [] CKD 3, [] CKD 4, [] CKD 5, []ESRD    Encephalopathy: [] Metabolic, [] Hepatic, [] toxic, [] Neurological, [] Other    Abnormal Nurtitional Status: [] malnurtition (see nutrition note), [ ]underweight: BMI < 19, [] morbid obesity: BMI >40, [] Cachexia    [] Sepsis  [] hypovolemic shock,[] cardiogenic shock, [] hemorrhagic shock, [] neuogenic shock  [] Acute Respiratory Failure  []Cerebral edema, [] Brain compression/ herniation,   [] Functional quadriplegia  [] Acute blood loss anemia

## 2021-08-19 DIAGNOSIS — Z51.81 ENCOUNTER FOR THERAPEUTIC DRUG LEVEL MONITORING: ICD-10-CM

## 2021-08-19 LAB
A1C WITH ESTIMATED AVERAGE GLUCOSE RESULT: 5.7 % — HIGH (ref 4–5.6)
ANION GAP SERPL CALC-SCNC: 9 MMOL/L — SIGNIFICANT CHANGE UP (ref 5–17)
BUN SERPL-MCNC: 12 MG/DL — SIGNIFICANT CHANGE UP (ref 7–23)
CALCIUM SERPL-MCNC: 8.5 MG/DL — SIGNIFICANT CHANGE UP (ref 8.4–10.5)
CHLORIDE SERPL-SCNC: 106 MMOL/L — SIGNIFICANT CHANGE UP (ref 96–108)
CO2 SERPL-SCNC: 24 MMOL/L — SIGNIFICANT CHANGE UP (ref 22–31)
CREAT SERPL-MCNC: 0.59 MG/DL — SIGNIFICANT CHANGE UP (ref 0.5–1.3)
ESTIMATED AVERAGE GLUCOSE: 117 MG/DL — HIGH (ref 68–114)
GLUCOSE BLDC GLUCOMTR-MCNC: 123 MG/DL — HIGH (ref 70–99)
GLUCOSE BLDC GLUCOMTR-MCNC: 125 MG/DL — HIGH (ref 70–99)
GLUCOSE BLDC GLUCOMTR-MCNC: 131 MG/DL — HIGH (ref 70–99)
GLUCOSE BLDC GLUCOMTR-MCNC: 155 MG/DL — HIGH (ref 70–99)
GLUCOSE SERPL-MCNC: 141 MG/DL — HIGH (ref 70–99)
HCT VFR BLD CALC: 33 % — LOW (ref 34.5–45)
HCV AB S/CO SERPL IA: 0.04 S/CO — SIGNIFICANT CHANGE UP
HCV AB SERPL-IMP: SIGNIFICANT CHANGE UP
HGB BLD-MCNC: 10.9 G/DL — LOW (ref 11.5–15.5)
MAGNESIUM SERPL-MCNC: 1.8 MG/DL — SIGNIFICANT CHANGE UP (ref 1.6–2.6)
MCHC RBC-ENTMCNC: 29.8 PG — SIGNIFICANT CHANGE UP (ref 27–34)
MCHC RBC-ENTMCNC: 33 GM/DL — SIGNIFICANT CHANGE UP (ref 32–36)
MCV RBC AUTO: 90.2 FL — SIGNIFICANT CHANGE UP (ref 80–100)
NRBC # BLD: 0 /100 WBCS — SIGNIFICANT CHANGE UP (ref 0–0)
PA ADP PRP-ACNC: 110 PRU — LOW (ref 194–418)
PA ADP PRP-ACNC: 185 PRU — LOW (ref 194–418)
PHOSPHATE SERPL-MCNC: 3.4 MG/DL — SIGNIFICANT CHANGE UP (ref 2.5–4.5)
PLATELET # BLD AUTO: 227 K/UL — SIGNIFICANT CHANGE UP (ref 150–400)
PLATELET RESPONSE ASPIRIN RESULT: 390 ARU — SIGNIFICANT CHANGE UP (ref 350–700)
PLATELET RESPONSE ASPIRIN RESULT: 498 ARU — SIGNIFICANT CHANGE UP (ref 350–700)
POTASSIUM SERPL-MCNC: 4.2 MMOL/L — SIGNIFICANT CHANGE UP (ref 3.5–5.3)
POTASSIUM SERPL-SCNC: 4.2 MMOL/L — SIGNIFICANT CHANGE UP (ref 3.5–5.3)
RBC # BLD: 3.66 M/UL — LOW (ref 3.8–5.2)
RBC # FLD: 13.4 % — SIGNIFICANT CHANGE UP (ref 10.3–14.5)
SODIUM SERPL-SCNC: 139 MMOL/L — SIGNIFICANT CHANGE UP (ref 135–145)
WBC # BLD: 7.69 K/UL — SIGNIFICANT CHANGE UP (ref 3.8–10.5)
WBC # FLD AUTO: 7.69 K/UL — SIGNIFICANT CHANGE UP (ref 3.8–10.5)

## 2021-08-19 PROCEDURE — 99222 1ST HOSP IP/OBS MODERATE 55: CPT

## 2021-08-19 PROCEDURE — 99231 SBSQ HOSP IP/OBS SF/LOW 25: CPT

## 2021-08-19 RX ORDER — CLOPIDOGREL BISULFATE 75 MG/1
150 TABLET, FILM COATED ORAL DAILY
Refills: 0 | Status: DISCONTINUED | OUTPATIENT
Start: 2021-08-20 | End: 2021-08-20

## 2021-08-19 RX ORDER — ASPIRIN/CALCIUM CARB/MAGNESIUM 324 MG
650 TABLET ORAL ONCE
Refills: 0 | Status: COMPLETED | OUTPATIENT
Start: 2021-08-19 | End: 2021-08-19

## 2021-08-19 RX ORDER — PANTOPRAZOLE SODIUM 20 MG/1
40 TABLET, DELAYED RELEASE ORAL
Refills: 0 | Status: DISCONTINUED | OUTPATIENT
Start: 2021-08-19 | End: 2021-08-23

## 2021-08-19 RX ORDER — CLOPIDOGREL BISULFATE 75 MG/1
150 TABLET, FILM COATED ORAL ONCE
Refills: 0 | Status: COMPLETED | OUTPATIENT
Start: 2021-08-19 | End: 2021-08-19

## 2021-08-19 RX ORDER — ACETAMINOPHEN WITH CODEINE 300MG-30MG
1 TABLET ORAL EVERY 6 HOURS
Refills: 0 | Status: DISCONTINUED | OUTPATIENT
Start: 2021-08-19 | End: 2021-08-23

## 2021-08-19 RX ORDER — ASPIRIN/CALCIUM CARB/MAGNESIUM 324 MG
650 TABLET ORAL DAILY
Refills: 0 | Status: DISCONTINUED | OUTPATIENT
Start: 2021-08-20 | End: 2021-08-21

## 2021-08-19 RX ORDER — DIAZEPAM 5 MG
5 TABLET ORAL EVERY 8 HOURS
Refills: 0 | Status: DISCONTINUED | OUTPATIENT
Start: 2021-08-19 | End: 2021-08-23

## 2021-08-19 RX ADMIN — Medication 1 TABLET(S): at 01:29

## 2021-08-19 RX ADMIN — CLOPIDOGREL BISULFATE 150 MILLIGRAM(S): 75 TABLET, FILM COATED ORAL at 11:50

## 2021-08-19 RX ADMIN — Medication 650 MILLIGRAM(S): at 01:29

## 2021-08-19 RX ADMIN — AMLODIPINE BESYLATE 10 MILLIGRAM(S): 2.5 TABLET ORAL at 09:27

## 2021-08-19 RX ADMIN — Medication 1 TABLET(S): at 21:04

## 2021-08-19 RX ADMIN — Medication 1 TABLET(S): at 13:49

## 2021-08-19 RX ADMIN — CLOPIDOGREL BISULFATE 75 MILLIGRAM(S): 75 TABLET, FILM COATED ORAL at 07:26

## 2021-08-19 RX ADMIN — SENNA PLUS 2 TABLET(S): 8.6 TABLET ORAL at 21:04

## 2021-08-19 RX ADMIN — ATENOLOL 50 MILLIGRAM(S): 25 TABLET ORAL at 11:51

## 2021-08-19 RX ADMIN — Medication 81 MILLIGRAM(S): at 07:26

## 2021-08-19 RX ADMIN — Medication 650 MILLIGRAM(S): at 11:51

## 2021-08-19 RX ADMIN — Medication 1 TABLET(S): at 16:42

## 2021-08-19 RX ADMIN — Medication 1 TABLET(S): at 06:24

## 2021-08-19 NOTE — PROGRESS NOTE ADULT - SUBJECTIVE AND OBJECTIVE BOX
HPI:  71 year old female w/ HTN, Osteoarthritis, Anxiety, GERD, overactive bladder with headaches, found to have b/l PCOMM aneurysms on cerebral angiogram 04/2021 now s/p elective Flow diverter Pipeline stent (8/18/21).    Hospital Course:   8/18: POD# 0 S/P femoral cerebral angiogram for R ICA pipeline stent for PComm aneurysm.  Post op with oozing from R groin site, compressed x 30 mins with improvement.   8/19: POD 1 s/p pipeline stent. Pending accumetrics after AM ASA/Plavix. Likely for discharge home today.      Vital Signs Last 24 Hrs  T(C): --  T(F): --  HR: 57 (19 Aug 2021 02:00) (57 - 69)  BP: 114/56 (19 Aug 2021 02:00) (100/54 - 158/65)  BP(mean): 80 (19 Aug 2021 02:00) (73 - 93)  RR: 19 (19 Aug 2021 02:00) (12 - 38)  SpO2: 95% (19 Aug 2021 02:00) (95% - 99%)    I&O's Detail    18 Aug 2021 07:01  -  19 Aug 2021 03:05  --------------------------------------------------------  IN:    sodium chloride 0.9%: 1500 mL  Total IN: 1500 mL    OUT:    Blood Loss (mL): 10 mL    Indwelling Catheter - Urethral (mL): 1005 mL  Total OUT: 1015 mL    Total NET: 485 mL        I&O's Summary    18 Aug 2021 07:01  -  19 Aug 2021 03:05  --------------------------------------------------------  IN: 1500 mL / OUT: 1015 mL / NET: 485 mL        PHYSICAL EXAM:  GEN: laying in bed, appears well, NAD  NEURO: AOx3. FC, OE spont, speech intact, face symmetric. CNII-XII intact. PERRL, EOMI. No pronator drift. MAEx4. 5/5 strength throughout (RLE not tested 2/2 groin access). SILT  CV: RRR +S1/S2  PULM: CTAB  GI: Abd soft, NT/ND  EXT: ext warm, dry, nontender  WOUND/DRAINS: R groin with dressing in place, blood tinged (redressed). DP/PT pulses 2+ b/l    TUBES/LINES:  [] CVC  [] A-line  [] Lumbar Drain  [] Ventriculostomy  [] Other    DIET:  [] NPO  [x] Mechanical  [] Tube feeds    LABS:                        11.0   10.45 )-----------( 224      ( 18 Aug 2021 18:17 )             34.0     08-18    140  |  104  |  11  ----------------------------<  113<H>  3.9   |  26  |  0.69    Ca    8.8      18 Aug 2021 18:17  Phos  3.4     08-18  Mg     1.9     08-18        Urinalysis Basic - ( 18 Aug 2021 19:13 )    Color: Yellow / Appearance: SL Cloudy / SG: <=1.005 / pH: x  Gluc: x / Ketone: NEGATIVE  / Bili: Negative / Urobili: 0.2 E.U./dL   Blood: x / Protein: Trace mg/dL / Nitrite: NEGATIVE   Leuk Esterase: Trace / RBC: < 5 /HPF / WBC Many /HPF   Sq Epi: x / Non Sq Epi: 0-1 /HPF / Bacteria: Present /HPF          CAPILLARY BLOOD GLUCOSE      POCT Blood Glucose.: 147 mg/dL (18 Aug 2021 21:33)  POCT Blood Glucose.: 114 mg/dL (18 Aug 2021 18:18)      Drug Levels: [] N/A    CSF Analysis: [] N/A      Allergies    No Known Allergies    Intolerances      MEDICATIONS:  Antibiotics:  trimethoprim  160 mG/sulfamethoxazole 800 mG 1 Tablet(s) Oral two times a day    Neuro:  acetaminophen   Tablet .. 650 milliGRAM(s) Oral every 6 hours PRN    Anticoagulation:  aspirin  chewable 81 milliGRAM(s) Oral every 24 hours  clopidogrel Tablet 75 milliGRAM(s) Oral every 24 hours    OTHER:  amLODIPine   Tablet 10 milliGRAM(s) Oral daily  ATENolol  Tablet 50 milliGRAM(s) Oral daily  chlorhexidine 4% Liquid 1 Application(s) Topical Once  insulin lispro (ADMELOG) corrective regimen sliding scale   SubCutaneous Before meals and at bedtime  senna 2 Tablet(s) Oral at bedtime    IVF:  calcium carbonate 1250 mG  + Vitamin D (OsCal 500 + D) 1 Tablet(s) Oral three times a day  sodium chloride 0.9%. 1000 milliLiter(s) IV Continuous <Continuous>    CULTURES:    RADIOLOGY & ADDITIONAL TESTS:      ASSESSMENT:   71 year old female w/ HTN, Osteoarthritis, Anxiety, GERD, overactive bladder with headaches, found to have b/l PCOMM aneurysms on cerebral angiogram 04/2021 now s/p elective Flow diverter Pipeline stent (8/18/21).      Plan:  NEURO:  - neuro/vitals/groin/pulse checks  - flat x 6 hrs  - pain control PRN, valium for back spasm  - cont ASA 81, Plavix 75; Accumetrics 8/19 at 10AM    CARDIOVASCULAR:  - normotensive SBP goal  - continue home meds: atenolol, amlodipine    PULMONARY:  - stable on RA    RENAL:  - IVF until adequate PO intake  - richmond in place, DC in AM    GI:  - ADAT  - bowel regimen  - PPI on steroids    HEME:  - SCDs for DVT ppx (L leg only x 6 hrs)    ID:  - f/u urine culture  - started on Bactrim x 3 days for + UA    ENDO:  - ISS    DISPO:  - full code, ICU    Discussed with Dr. Jc

## 2021-08-19 NOTE — DISCHARGE NOTE PROVIDER - CARE PROVIDER_API CALL
Alden Jc; MPH; MS)  Neurosurgery Surgery  [address], [address]  [place] [address]  Phone: [phone]  Fax: [phone]  Follow Up Time:    Alden Jc; MPH; MS)  Neurosurgery Surgery  [address] [facility], [address]  [place] [address]  Phone: [phone]  Fax: [phone]  Follow Up Time:     Arabella Coon)  Internal Medicine  [address], [address] [place] [address]  Phone: [phone]  Fax: [phone]  Follow Up Time:

## 2021-08-19 NOTE — PROVIDER CONTACT NOTE (OTHER) - ASSESSMENT
c/o pain to right thigh. Pulses palpable to all extremities. Steri strips CDI to R groin site, no hematoma.

## 2021-08-19 NOTE — PHYSICAL THERAPY INITIAL EVALUATION ADULT - ADL SKILLS, REHAB EVAL
Patient presents to ED for evaluation of SOB worsening since discharge on 08/31.  Patient states he was admitted for PE and d/c home, but has had increased SOB and O2 sats in the 80's consistently.  Patient denies CP, cough or fevers.   Increased SOB with activity.   independent

## 2021-08-19 NOTE — DISCHARGE NOTE PROVIDER - PROVIDER TOKENS
PROVIDER:[TOKEN:[[id number]]] PROVIDER:[TOKEN:[[id number]]],PROVIDER:[TOKEN:[[id number]]]

## 2021-08-19 NOTE — PHYSICAL THERAPY INITIAL EVALUATION ADULT - PERTINENT HX OF CURRENT PROBLEM, REHAB EVAL
71 year old female w/ Osteoarthritis, HTN, Anxiety, GERD presents for elective cerebral angiography. Patient reports chronic neck and head pain, but otherwise denies visual or hearing changes, extremity weakness or numbness, or gait changes. 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 She presents today for Pipeline stent placement for likely right PCOMM aneurysm.

## 2021-08-19 NOTE — DISCHARGE NOTE PROVIDER - NSDCCPTREATMENT_GEN_ALL_CORE_FT
PRINCIPAL PROCEDURE  Procedure: Angiogram, cerebral, with embolization  Findings and Treatment: Right ICA Pipeline Stent

## 2021-08-19 NOTE — DISCHARGE NOTE PROVIDER - NSDCFUADDINST_GEN_ALL_CORE_FT
Neurosurgery follow up appointment date/time:  - please call the office to confirm appointment    Wound Care:  - Your dressing will be removed 8/19.   - You may shower. No bathing or swimming.    Activity:  - fatigue is common after surgery, rest if you feel tired   - no bending, lifting, twisting or heavy lifting   - walking is recommended, ambulate as tolerated  - you may shower when you get home, keep your incision dry  - no bathing   - no driving within 24 hours of anesthesia or while taking prescription pain medications   - keep hydrated, drink plenty of water       Please also follow up with your primary care doctor.       Medications:  - continue your Aspirin 81 mg daily and Plavix 75 mg daily  - continue all other home meds as instructed  - pain medications can cause constipation, you should eat a high fiber diet and may take a stool softener while on pain meds   - Avoid taking Advil/Motrin (ibuprofen), Aleve (naproxen) pain as they can cause bleeding       PAIN MEDICATION:   A mild pain at the puncture site is not unusual.  You may take Tylenol 1-2 tabs every 4-6 hours as needed, for one day.  If the pain persists contact your doctor.    SPECIAL INSTRUCTIONS:  Signs and symptoms to look out for:  1. Jose bleeding from the puncture site is an emergency.  Put direct pressure on the site and go directly to your local Emergency   Room for treatment.  2. Bleeding under the skin may also occur and a small "black and blue" may be expected. If there appears to be an expanding mass or swelling around the puncture site, apply manual compression and go  immediately to your local Emergency Room for treatment.  3. If your foot/leg or hand/arm (puncture site) becomes cool or blue and/or you are unable to move it, this must be treated as an   emergency. go directly to your local emergency room for treatment.  4. Excessive puncture site pain is abnormal and should be assessed.  5.  Look out for signs of infection in the puncture site: fever, red streaking of the leg, discharge, pain.   6.  Lack of adequate urine output, provided you are drinking enough fluids, may be cause for concern, notify us if you think this is the case.                        WITHIN 24 HOURS OF DISCHARGE, PLEASE CONTACT NEURO PA  WITH ANY QUESTIONS OR CONCERNS: [phone]   OTHERWISE, PLEASE CALL THE OFFICE WITH ANY QUESTIONS OR CONCERNS: [phone]   Neurosurgery follow up appointment date/time:  - please call the office to confirm appointment    Wound Care:  - Your dressing will be removed 8/19.   - You may shower. No bathing or swimming.    Activity:  - fatigue is common after surgery, rest if you feel tired   - no bending, lifting, twisting or heavy lifting   - walking is recommended, ambulate as tolerated  - you may shower when you get home, keep your incision dry  - no bathing   - no driving within 24 hours of anesthesia or while taking prescription pain medications   - keep hydrated, drink plenty of water       Please also follow up with your primary care doctor.       Medications:  - continue your Aspirin daily and Plavix daily  - continue your antibiotic (Bactrim) as prescribed for your urinary tract infection  - continue all other home meds as instructed  - pain medications can cause constipation, you should eat a high fiber diet and may take a stool softener while on pain meds   - Avoid taking Advil/Motrin (ibuprofen), Aleve (naproxen) pain as they can cause bleeding       PAIN MEDICATION:   A mild pain at the puncture site is not unusual.  You may take Tylenol 1-2 tabs every 4-6 hours as needed, for one day.  If the pain persists contact your doctor.    SPECIAL INSTRUCTIONS:  Signs and symptoms to look out for:  1. Jose bleeding from the puncture site is an emergency.  Put direct pressure on the site and go directly to your local Emergency   Room for treatment.  2. Bleeding under the skin may also occur and a small "black and blue" may be expected. If there appears to be an expanding mass or swelling around the puncture site, apply manual compression and go  immediately to your local Emergency Room for treatment.  3. If your foot/leg or hand/arm (puncture site) becomes cool or blue and/or you are unable to move it, this must be treated as an   emergency. go directly to your local emergency room for treatment.  4. Excessive puncture site pain is abnormal and should be assessed.  5.  Look out for signs of infection in the puncture site: fever, red streaking of the leg, discharge, pain.   6.  Lack of adequate urine output, provided you are drinking enough fluids, may be cause for concern, notify us if you think this is the case.                        WITHIN 24 HOURS OF DISCHARGE, PLEASE CONTACT NEURO PA  WITH ANY QUESTIONS OR CONCERNS: [phone]   OTHERWISE, PLEASE CALL THE OFFICE WITH ANY QUESTIONS OR CONCERNS: [phone]   Neurosurgery follow up appointment date/time:  - please call the office to confirm appointment    Wound Care:  - You may shower. No bathing or swimming.    Activity:  - fatigue is common after surgery, rest if you feel tired   - no bending, lifting, twisting or heavy lifting   - walking is recommended, ambulate as tolerated  - you may shower when you get home, keep your incision dry  - no bathing   - no driving within 24 hours of anesthesia or while taking prescription pain medications   - keep hydrated, drink plenty of water       Please also follow up with your primary care doctor.       Medications:  - continue your Aspirin daily and Plavix daily  - continue your antibiotic (Bactrim) as prescribed for your urinary tract infection  - continue all other home meds as instructed  - pain medications can cause constipation, you should eat a high fiber diet and may take a stool softener while on pain meds   - Avoid taking Advil/Motrin (ibuprofen), Aleve (naproxen) pain as they can cause bleeding       PAIN MEDICATION:   A mild pain at the puncture site is not unusual.  You may take Tylenol 1-2 tabs every 4-6 hours as needed, for one day.  If the pain persists contact your doctor.    SPECIAL INSTRUCTIONS:  Signs and symptoms to look out for:  1. Jose bleeding from the puncture site is an emergency.  Put direct pressure on the site and go directly to your local Emergency   Room for treatment.  2. Bleeding under the skin may also occur and a small "black and blue" may be expected. If there appears to be an expanding mass or swelling around the puncture site, apply manual compression and go  immediately to your local Emergency Room for treatment.  3. If your foot/leg or hand/arm (puncture site) becomes cool or blue and/or you are unable to move it, this must be treated as an   emergency. go directly to your local emergency room for treatment.  4. Excessive puncture site pain is abnormal and should be assessed.  5.  Look out for signs of infection in the puncture site: fever, red streaking of the leg, discharge, pain.   6.  Lack of adequate urine output, provided you are drinking enough fluids, may be cause for concern, notify us if you think this is the case.                        WITHIN 24 HOURS OF DISCHARGE, PLEASE CONTACT NEURO PA  WITH ANY QUESTIONS OR CONCERNS: [phone]   OTHERWISE, PLEASE CALL THE OFFICE WITH ANY QUESTIONS OR CONCERNS: [phone]   Neurosurgery follow up:   - please call the office to confirm appointment  [phone]    Wound Care:  - You may shower. No bathing or swimming.    Activity:  - fatigue is common after surgery, rest if you feel tired   - no bending, lifting, twisting or heavy lifting   - walking is recommended, ambulate as tolerated  - you may shower when you get home, keep your incision dry  - no bathing   - no driving within 24 hours of anesthesia or while taking prescription pain medications   - keep hydrated, drink plenty of water     You need to get blood work on Wednesday at [facility] and then Dr. Coon will adjust/restart your Plavix     Please also follow up with your primary care doctor.     Medications:  - HOLD Plavix   - continue your Aspirin 325 daily  - continue all other home meds as instructed  - pain medications can cause constipation, you should eat a high fiber diet and may take a stool softener while on pain meds   - Avoid taking Advil/Motrin (ibuprofen), Aleve (naproxen) pain as they can cause bleeding     PAIN MEDICATION:   A mild pain at the puncture site is not unusual.  You may take Tylenol 1-2 tabs every 4-6 hours as needed, for one day.  If the pain persists contact your doctor.    SPECIAL INSTRUCTIONS:  Signs and symptoms to look out for:  1. Jose bleeding from the puncture site is an emergency.  Put direct pressure on the site and go directly to your local Emergency   Room for treatment.  2. Bleeding under the skin may also occur and a small "black and blue" may be expected. If there appears to be an expanding mass or swelling around the puncture site, apply manual compression and go  immediately to your local Emergency Room for treatment.  3. If your foot/leg or hand/arm (puncture site) becomes cool or blue and/or you are unable to move it, this must be treated as an   emergency. go directly to your local emergency room for treatment.  4. Excessive puncture site pain is abnormal and should be assessed.  5.  Look out for signs of infection in the puncture site: fever, red streaking of the leg, discharge, pain.   6.  Lack of adequate urine output, provided you are drinking enough fluids, may be cause for concern, notify us if you think this is the case.              WITHIN 24 HOURS OF DISCHARGE, PLEASE CONTACT NEURO PA  WITH ANY QUESTIONS OR CONCERNS: [phone]   OTHERWISE, PLEASE CALL THE OFFICE WITH ANY QUESTIONS OR CONCERNS: [phone]

## 2021-08-19 NOTE — DISCHARGE NOTE PROVIDER - NSDCCPCAREPLAN_GEN_ALL_CORE_FT
PRINCIPAL DISCHARGE DIAGNOSIS  Diagnosis: Cerebral aneurysm  Assessment and Plan of Treatment:       SECONDARY DISCHARGE DIAGNOSES  Diagnosis: Overactive bladder  Assessment and Plan of Treatment:     Diagnosis: GERD (gastroesophageal reflux disease)  Assessment and Plan of Treatment:     Diagnosis: Osteoporosis  Assessment and Plan of Treatment:     Diagnosis: HTN (hypertension)  Assessment and Plan of Treatment:      PRINCIPAL DISCHARGE DIAGNOSIS  Diagnosis: Cerebral aneurysm  Assessment and Plan of Treatment:       SECONDARY DISCHARGE DIAGNOSES  Diagnosis: HTN (hypertension)  Assessment and Plan of Treatment:     Diagnosis: GERD (gastroesophageal reflux disease)  Assessment and Plan of Treatment:     Diagnosis: Normocytic anemia  Assessment and Plan of Treatment:     Diagnosis: Overactive bladder  Assessment and Plan of Treatment:     Diagnosis: Encounter for monitoring aspirin therapy  Assessment and Plan of Treatment:     Diagnosis: Stage 3 chronic kidney disease  Assessment and Plan of Treatment:     Diagnosis: Osteoporosis  Assessment and Plan of Treatment:      PRINCIPAL DISCHARGE DIAGNOSIS  Diagnosis: Cerebral aneurysm  Assessment and Plan of Treatment:       SECONDARY DISCHARGE DIAGNOSES  Diagnosis: HTN (hypertension)  Assessment and Plan of Treatment:     Diagnosis: GERD (gastroesophageal reflux disease)  Assessment and Plan of Treatment:     Diagnosis: Normocytic anemia  Assessment and Plan of Treatment:     Diagnosis: Overactive bladder  Assessment and Plan of Treatment:     Diagnosis: Encounter for monitoring aspirin therapy  Assessment and Plan of Treatment:     Diagnosis: Stage 3 chronic kidney disease  Assessment and Plan of Treatment:     Diagnosis: Encounter for patient compliance monitoring in drug treatment program  Assessment and Plan of Treatment:     Diagnosis: Acute UTI  Assessment and Plan of Treatment:     Diagnosis: Osteoporosis  Assessment and Plan of Treatment:

## 2021-08-19 NOTE — DISCHARGE NOTE PROVIDER - NSDCFUADDAPPT_GEN_ALL_CORE_FT
Please call Dr. Jc's office to schedule your follow up appointment.    Please also follow up with your primary care doctor. Please call Dr. Jc's office to schedule your follow up appointment.    Please follow up with Dr. Coon this week. You will need blood work on Wednesday and then Dr. Coon will adjust/restart your Plavix.     Please also follow up with your primary care doctor.

## 2021-08-19 NOTE — DISCHARGE NOTE PROVIDER - HOSPITAL COURSE
HPI:  71 year old female w/ Osteoarthritis, HTN, Anxiety, GERD presents for elective cerebral angiography. Patient reports chronic neck and head pain, but otherwise denies visual or hearing changes, extremity weakness or numbness, or gait changes. 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 She presents today for Pipeline stent placement for likely right PCOMM aneurysm. She has been on Aspirin and Plavix in anticipation of this procedure.    Hospital Course:  8/18: POD# 0 S/P femoral cerebral angiogram for R ICA pipeline stent for PComm aneurysm.  Post op with oozing from R groin site, compressed x 30 mins with improvement.   8/19: POD 1 s/p pipeline stent. Pending accumetrics after AM ASA/Plavix. Likely for discharge home today.    Hospital course c/b: UTI - started on Bactrim    Exam on day of discharge:  GEN: laying in bed, appears well, NAD  NEURO: AOx3. FC, OE spont, speech intact, face symmetric. CNII-XII intact. PERRL, EOMI. No pronator drift. MAEx4. 5/5 strength throughout (RLE not tested 2/2 groin access). SILT  CV: RRR +S1/S2  PULM: CTAB  GI: Abd soft, NT/ND  EXT: ext warm, dry, nontender  WOUND/DRAINS: R groin with dressing in place, blood tinged (redressed). DP/PT pulses 2+ b/l           HPI:  71 year old female w/ Osteoarthritis, HTN, Anxiety, GERD presents for elective cerebral angiography. Patient reports chronic neck and head pain, but otherwise denies visual or hearing changes, extremity weakness or numbness, or gait changes. 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 She presents today for Pipeline stent placement for likely right PCOMM aneurysm. She has been on Aspirin and Plavix in anticipation of this procedure.    Hospital Course:  8/18: POD# 0 S/P femoral cerebral angiogram for R ICA pipeline stent for PComm aneurysm.  Post op with oozing from R groin site, compressed x 30 mins with improvement.   8/19: POD 1 s/p pipeline stent. Pending accumetrics after AM ASA/Plavix. Likely for discharge home today.    Hospital course c/b: UTI - started on Bactrim    Pt evaluated by PT: recommended for home with no skilled PT needs  Pt is going home    Exam on day of discharge:  GEN: laying in bed, appears well, NAD  NEURO: AOx3. FC, OE spont, speech intact, face symmetric. CNII-XII intact. PERRL, EOMI. No pronator drift. MAEx4. 5/5 strength throughout (RLE not tested 2/2 groin access). SILT  CV: RRR +S1/S2  PULM: CTAB  GI: Abd soft, NT/ND  EXT: ext warm, dry, nontender  WOUND/DRAINS: R groin with dressing in place, blood tinged (redressed). DP/PT pulses 2+ b/l           HPI:  71 year old female w/ Osteoarthritis, HTN, Anxiety, GERD presents for elective cerebral angiography. Patient reports chronic neck and head pain, but otherwise denies visual or hearing changes, extremity weakness or numbness, or gait changes. 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 She presents today for Pipeline stent placement for likely right PCOMM aneurysm. She has been on Aspirin and Plavix in anticipation of this procedure.    Hospital Course:  8/18: POD# 0 S/P femoral cerebral angiogram for R ICA pipeline stent for PComm aneurysm.  Post op with oozing from R groin site, compressed x 30 mins with improvement.   8/19: POD 1 s/p pipeline stent. Pending accumetrics after AM ASA/Plavix. Likely for discharge home today.  8/20: POD 2, TONI overnightm neuro stable. Pending repeat accumentrics this afternoon.    Hospital course c/b: UTI - started on Bactrim    Pt evaluated by PT: recommended for home with no skilled PT needs  Pt is going home    Exam on day of discharge:  GEN: laying in bed, appears well, NAD  NEURO: AOx3. FC, OE spont, speech intact, face symmetric. CNII-XII intact. PERRL, EOMI. No pronator drift. MAEx4. 5/5 strength throughout (RLE not tested 2/2 groin access). SILT  CV: RRR +S1/S2  PULM: CTAB  GI: Abd soft, NT/ND  EXT: ext warm, dry, nontender  WOUND/DRAINS: R groin with dressing in place. DP/PT pulses 2+ b/l      Patient is neuro stable, vitals stable, afebrile, medically ready for discharge    HPI:  71 year old female w/ Osteoarthritis, HTN, Anxiety, GERD presents for elective cerebral angiography. Patient reports chronic neck and head pain, but otherwise denies visual or hearing changes, extremity weakness or numbness, or gait changes. 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 She presents today for Pipeline stent placement for likely right PCOMM aneurysm. She has been on Aspirin and Plavix in anticipation of this procedure.    Hospital Course:  8/18: POD# 0 S/P femoral cerebral angiogram for R ICA pipeline stent for PComm aneurysm.  Post op with oozing from R groin site, compressed x 30 mins with improvement.   8/19: POD 1 s/p pipeline stent. Pending accumetrics after AM ASA/Plavix. Likely for discharge home today.  8/20: POD 2, TONI overnightm neuro stable. Pending repeat accumentrics this afternoon.   8/21: POD 3. TONI overnight. p2y12 resulted supratherapeutic, pending repeat accumetrics in the afternoon   8/22: POD 4. TONI overnight. Repeat P2y12 and ASA assay in the morning. Hold plavix dose until monday per heme recommendations  8/23: POD 5: TONI, pending repeat accumetrics of ASA/Plavix. Dr. Coon following. Anticipate DC home.    Hospital course c/b: UTI - completed course of Bactrim    Pt evaluated by PT: recommended for home with no skilled PT needs  Pt is going home    Exam on day of discharge:  Gen: NAD, AAOx3.  HEENT: PERRL. EOMI. VF grossly intact.  Neck: FROM, nontender  Lungs: Clear b/l  Heart: S1, S2. NSR.  Abd: Soft, NT/ND. +BS  Exts: Pulses 2+ throughout. Right groin mild ecchymosis/hematoma.  Neuro: CNs II-XII intact. 5/5 str x4 extremities. Sensation to LT intact. Following commands. Gait WNL.    Patient is neuro stable, vitals stable, afebrile, medically ready for discharge

## 2021-08-19 NOTE — CONSULT NOTE ADULT - PROBLEM SELECTOR RECOMMENDATION 2
As pt is post procedure, will load with Aspirin while in the hospital and recheck her Aspirin plt response in AM

## 2021-08-19 NOTE — PHYSICAL THERAPY INITIAL EVALUATION ADULT - GENERAL OBSERVATIONS, REHAB EVAL
Pt. received in bed in no acute distress, +R groin incision C/D/I no hematoma, EKG, complaints of right thigh pain.

## 2021-08-19 NOTE — PROGRESS NOTE ADULT - SUBJECTIVE AND OBJECTIVE BOX
HPI  71 year old female w/ Osteoarthritis, HTN, Anxiety, GERD presents for elective cerebral angiography. Patient reports chronic neck and head pain, but otherwise denies visual or hearing changes, extremity weakness or numbness, or gait changes. 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 She presents today for Pipeline stent placement for likely right PCOMM aneurysm. She has been on Aspirin and Plavix in anticipation of this procedure.    Hospital Course:   8/18: POD# 0 S/P femoral cerebral angiogram for R ICA pipeline stent for PComm aneurysm.  Post op with oozing from R groin site, compressed x 30 mins with improvement.   8/19: POD 1 s/p pipeline stent. Pending accumetrics after AM ASA/Plavix. Likely for discharge home today.        Vital Signs Last 24 Hrs  T(C): --  T(F): --  HR: 57 (19 Aug 2021 02:00) (57 - 69)  BP: 114/56 (19 Aug 2021 02:00) (100/54 - 158/65)  BP(mean): 80 (19 Aug 2021 02:00) (73 - 93)  RR: 19 (19 Aug 2021 02:00) (12 - 38)  SpO2: 95% (19 Aug 2021 02:00) (95% - 99%)    I&O's Detail    18 Aug 2021 07:01  -  19 Aug 2021 02:43  --------------------------------------------------------  IN:    sodium chloride 0.9%: 1500 mL  Total IN: 1500 mL    OUT:    Blood Loss (mL): 10 mL    Indwelling Catheter - Urethral (mL): 1005 mL  Total OUT: 1015 mL    Total NET: 485 mL        I&O's Summary    18 Aug 2021 07:01  -  19 Aug 2021 02:43  --------------------------------------------------------  IN: 1500 mL / OUT: 1015 mL / NET: 485 mL        PHYSICAL EXAM:    GEN: laying in bed, appears well, NAD  NEURO: AOx3. FC, OE spont, speech intact, face symmetric. CNII-XII intact. PERRL, EOMI. No pronator drift. MAEx4. 5/5 strength throughout (RLE not tested 2/2 groin access). SILT  CV: RRR +S1/S2  PULM: CTAB  GI: Abd soft, NT/ND  EXT: ext warm, dry, nontender  WOUND/DRAINS: R groin with dressing in place c/d/i. DP/PT pulses 2+ b/l      TUBES/LINES:  [] CVC  [] A-line  [] Lumbar Drain  [] Ventriculostomy  [] Other    DIET:  [] NPO  [x] Mechanical  [] Tube feeds    LABS:                        11.0   10.45 )-----------( 224      ( 18 Aug 2021 18:17 )             34.0     08-18    140  |  104  |  11  ----------------------------<  113<H>  3.9   |  26  |  0.69    Ca    8.8      18 Aug 2021 18:17  Phos  3.4     08-18  Mg     1.9     08-18        Urinalysis Basic - ( 18 Aug 2021 19:13 )    Color: Yellow / Appearance: SL Cloudy / SG: <=1.005 / pH: x  Gluc: x / Ketone: NEGATIVE  / Bili: Negative / Urobili: 0.2 E.U./dL   Blood: x / Protein: Trace mg/dL / Nitrite: NEGATIVE   Leuk Esterase: Trace / RBC: < 5 /HPF / WBC Many /HPF   Sq Epi: x / Non Sq Epi: 0-1 /HPF / Bacteria: Present /HPF          CAPILLARY BLOOD GLUCOSE      POCT Blood Glucose.: 147 mg/dL (18 Aug 2021 21:33)  POCT Blood Glucose.: 114 mg/dL (18 Aug 2021 18:18)      Drug Levels: [] N/A    CSF Analysis: [] N/A      Allergies    No Known Allergies    Intolerances      MEDICATIONS:  Antibiotics:  trimethoprim  160 mG/sulfamethoxazole 800 mG 1 Tablet(s) Oral two times a day    Neuro:  acetaminophen   Tablet .. 650 milliGRAM(s) Oral every 6 hours PRN    Anticoagulation:  aspirin  chewable 81 milliGRAM(s) Oral every 24 hours  clopidogrel Tablet 75 milliGRAM(s) Oral every 24 hours    OTHER:  amLODIPine   Tablet 10 milliGRAM(s) Oral daily  ATENolol  Tablet 50 milliGRAM(s) Oral daily  chlorhexidine 4% Liquid 1 Application(s) Topical Once  insulin lispro (ADMELOG) corrective regimen sliding scale   SubCutaneous Before meals and at bedtime  senna 2 Tablet(s) Oral at bedtime    IVF:  calcium carbonate 1250 mG  + Vitamin D (OsCal 500 + D) 1 Tablet(s) Oral three times a day  sodium chloride 0.9%. 1000 milliLiter(s) IV Continuous <Continuous>    CULTURES:    RADIOLOGY & ADDITIONAL TESTS:      ASSESSMENT:  Assessment: 71 year old female w/ HTN, Osteoarthritis, Anxiety, GERD, overactive bladder with headaches, found to have b/l PCOMM aneurysms on cerebral angiogram 04/2021 now s/p elective Flow diverter Pipeline stent (8/18/21).      Plan:  NEURO:  - neuro/vitals/groin/pulse checks  - flat x 6 hrs  - pain control PRN, valium for back spasm  - cont ASA 81, Plavix 75; Accumetrics 8/19 at 10AM    CARDIOVASCULAR:  - normotensive SBP goal  - continue home meds: atenolol, amlodipine    PULMONARY:  - stable on RA    RENAL:  - IVF until adequate PO intake  - richmond in place, DC in AM    GI:  - ADAT  - bowel regimen  - PPI on steroids    HEME:  - SCDs for DVT ppx (L leg only x 6 hrs)    ID:  - f/u urine culture  - started on Bactrim x 3 days for + UA    ENDO:  - ISS    DISPO:  - full code, ICU    Discussed with Dr. Jc

## 2021-08-19 NOTE — DISCHARGE NOTE PROVIDER - CARE PROVIDERS DIRECT ADDRESSES
,DirectAddress_Unknown ,DirectAddress_Unknown,[email or web address]

## 2021-08-20 ENCOUNTER — TRANSCRIPTION ENCOUNTER (OUTPATIENT)
Age: 71
End: 2021-08-20

## 2021-08-20 DIAGNOSIS — N18.30 CHRONIC KIDNEY DISEASE, STAGE 3 UNSPECIFIED: ICD-10-CM

## 2021-08-20 DIAGNOSIS — D64.9 ANEMIA, UNSPECIFIED: ICD-10-CM

## 2021-08-20 LAB
-  AMPICILLIN/SULBACTAM: SIGNIFICANT CHANGE UP
-  AMPICILLIN: SIGNIFICANT CHANGE UP
-  CEFAZOLIN: SIGNIFICANT CHANGE UP
-  CEFTRIAXONE: SIGNIFICANT CHANGE UP
-  CIPROFLOXACIN: SIGNIFICANT CHANGE UP
-  ERTAPENEM: SIGNIFICANT CHANGE UP
-  GENTAMICIN: SIGNIFICANT CHANGE UP
-  NITROFURANTOIN: SIGNIFICANT CHANGE UP
-  PIPERACILLIN/TAZOBACTAM: SIGNIFICANT CHANGE UP
-  TOBRAMYCIN: SIGNIFICANT CHANGE UP
-  TRIMETHOPRIM/SULFAMETHOXAZOLE: SIGNIFICANT CHANGE UP
ANION GAP SERPL CALC-SCNC: 7 MMOL/L — SIGNIFICANT CHANGE UP (ref 5–17)
BUN SERPL-MCNC: 14 MG/DL — SIGNIFICANT CHANGE UP (ref 7–23)
CALCIUM SERPL-MCNC: 9.4 MG/DL — SIGNIFICANT CHANGE UP (ref 8.4–10.5)
CHLORIDE SERPL-SCNC: 106 MMOL/L — SIGNIFICANT CHANGE UP (ref 96–108)
CO2 SERPL-SCNC: 29 MMOL/L — SIGNIFICANT CHANGE UP (ref 22–31)
CREAT SERPL-MCNC: 0.96 MG/DL — SIGNIFICANT CHANGE UP (ref 0.5–1.3)
CULTURE RESULTS: SIGNIFICANT CHANGE UP
GLUCOSE BLDC GLUCOMTR-MCNC: 119 MG/DL — HIGH (ref 70–99)
GLUCOSE BLDC GLUCOMTR-MCNC: 86 MG/DL — SIGNIFICANT CHANGE UP (ref 70–99)
GLUCOSE BLDC GLUCOMTR-MCNC: 87 MG/DL — SIGNIFICANT CHANGE UP (ref 70–99)
GLUCOSE BLDC GLUCOMTR-MCNC: 94 MG/DL — SIGNIFICANT CHANGE UP (ref 70–99)
GLUCOSE SERPL-MCNC: 95 MG/DL — SIGNIFICANT CHANGE UP (ref 70–99)
HCT VFR BLD CALC: 33.1 % — LOW (ref 34.5–45)
HGB BLD-MCNC: 11 G/DL — LOW (ref 11.5–15.5)
MAGNESIUM SERPL-MCNC: 1.9 MG/DL — SIGNIFICANT CHANGE UP (ref 1.6–2.6)
MCHC RBC-ENTMCNC: 30.6 PG — SIGNIFICANT CHANGE UP (ref 27–34)
MCHC RBC-ENTMCNC: 33.2 GM/DL — SIGNIFICANT CHANGE UP (ref 32–36)
MCV RBC AUTO: 91.9 FL — SIGNIFICANT CHANGE UP (ref 80–100)
METHOD TYPE: SIGNIFICANT CHANGE UP
NRBC # BLD: 0 /100 WBCS — SIGNIFICANT CHANGE UP (ref 0–0)
ORGANISM # SPEC MICROSCOPIC CNT: SIGNIFICANT CHANGE UP
ORGANISM # SPEC MICROSCOPIC CNT: SIGNIFICANT CHANGE UP
PA ADP PRP-ACNC: 5 PRU — LOW (ref 194–418)
PA ADP PRP-ACNC: 7 PRU — LOW (ref 194–418)
PHOSPHATE SERPL-MCNC: 3.2 MG/DL — SIGNIFICANT CHANGE UP (ref 2.5–4.5)
PLATELET # BLD AUTO: 214 K/UL — SIGNIFICANT CHANGE UP (ref 150–400)
PLATELET RESPONSE ASPIRIN RESULT: 390 ARU — SIGNIFICANT CHANGE UP (ref 350–700)
PLATELET RESPONSE ASPIRIN RESULT: 588 ARU — SIGNIFICANT CHANGE UP (ref 350–700)
POTASSIUM SERPL-MCNC: 3.9 MMOL/L — SIGNIFICANT CHANGE UP (ref 3.5–5.3)
POTASSIUM SERPL-SCNC: 3.9 MMOL/L — SIGNIFICANT CHANGE UP (ref 3.5–5.3)
RBC # BLD: 3.6 M/UL — LOW (ref 3.8–5.2)
RBC # FLD: 13.8 % — SIGNIFICANT CHANGE UP (ref 10.3–14.5)
SODIUM SERPL-SCNC: 142 MMOL/L — SIGNIFICANT CHANGE UP (ref 135–145)
SPECIMEN SOURCE: SIGNIFICANT CHANGE UP
WBC # BLD: 8.6 K/UL — SIGNIFICANT CHANGE UP (ref 3.8–10.5)
WBC # FLD AUTO: 8.6 K/UL — SIGNIFICANT CHANGE UP (ref 3.8–10.5)

## 2021-08-20 PROCEDURE — 99231 SBSQ HOSP IP/OBS SF/LOW 25: CPT

## 2021-08-20 PROCEDURE — 99222 1ST HOSP IP/OBS MODERATE 55: CPT | Mod: GC

## 2021-08-20 RX ORDER — SIMETHICONE 80 MG/1
80 TABLET, CHEWABLE ORAL EVERY 12 HOURS
Refills: 0 | Status: DISCONTINUED | OUTPATIENT
Start: 2021-08-20 | End: 2021-08-23

## 2021-08-20 RX ORDER — ACETAMINOPHEN 500 MG
2 TABLET ORAL
Qty: 0 | Refills: 0 | DISCHARGE
Start: 2021-08-20

## 2021-08-20 RX ORDER — DIAZEPAM 5 MG
1 TABLET ORAL
Qty: 9 | Refills: 0
Start: 2021-08-20 | End: 2021-08-22

## 2021-08-20 RX ORDER — PANTOPRAZOLE SODIUM 20 MG/1
40 TABLET, DELAYED RELEASE ORAL ONCE
Refills: 0 | Status: COMPLETED | OUTPATIENT
Start: 2021-08-20 | End: 2021-08-20

## 2021-08-20 RX ORDER — CLOPIDOGREL BISULFATE 75 MG/1
75 TABLET, FILM COATED ORAL DAILY
Refills: 0 | Status: DISCONTINUED | OUTPATIENT
Start: 2021-08-21 | End: 2021-08-21

## 2021-08-20 RX ORDER — ACETAMINOPHEN WITH CODEINE 300MG-30MG
1 TABLET ORAL
Qty: 12 | Refills: 0
Start: 2021-08-20 | End: 2021-08-22

## 2021-08-20 RX ADMIN — Medication 1 TABLET(S): at 09:48

## 2021-08-20 RX ADMIN — CLOPIDOGREL BISULFATE 150 MILLIGRAM(S): 75 TABLET, FILM COATED ORAL at 11:29

## 2021-08-20 RX ADMIN — Medication 1 TABLET(S): at 12:34

## 2021-08-20 RX ADMIN — PANTOPRAZOLE SODIUM 40 MILLIGRAM(S): 20 TABLET, DELAYED RELEASE ORAL at 09:58

## 2021-08-20 RX ADMIN — Medication 1 TABLET(S): at 05:30

## 2021-08-20 RX ADMIN — Medication 650 MILLIGRAM(S): at 11:29

## 2021-08-20 RX ADMIN — Medication 1 TABLET(S): at 12:18

## 2021-08-20 RX ADMIN — SENNA PLUS 2 TABLET(S): 8.6 TABLET ORAL at 21:46

## 2021-08-20 RX ADMIN — Medication 1 TABLET(S): at 21:46

## 2021-08-20 RX ADMIN — Medication 1 TABLET(S): at 00:54

## 2021-08-20 RX ADMIN — Medication 1 TABLET(S): at 09:30

## 2021-08-20 RX ADMIN — SIMETHICONE 80 MILLIGRAM(S): 80 TABLET, CHEWABLE ORAL at 12:33

## 2021-08-20 RX ADMIN — AMLODIPINE BESYLATE 10 MILLIGRAM(S): 2.5 TABLET ORAL at 05:31

## 2021-08-20 NOTE — PROGRESS NOTE ADULT - SUBJECTIVE AND OBJECTIVE BOX
HPI:  71 year old female w/ Osteoarthritis, HTN, Anxiety, GERD presents for elective cerebral angiography. Patient reports chronic neck and head pain, but otherwise denies visual or hearing changes, extremity weakness or numbness, or gait changes. 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 She presents today for Pipeline stent placement for likely right PCOMM aneurysm. She has been on Aspirin and Plavix in anticipation of this procedure. (18 Aug 2021 15:07)      Hospital course:   8/18: POD# 0 S/P femoral cerebral angiogram for R ICA pipeline stent for PComm aneurysm.  Post op with oozing from R groin site, compressed x 30 mins with improvement.   8/19: POD 1 s/p pipeline stent. Pending accumetrics after AM ASA/Plavix.   8/20: POD 2, TONI overnightm neuro stable. Pending repeat accumentrics this afternoon.      Vital Signs Last 24 Hrs  T(C): 36.7 (19 Aug 2021 22:10), Max: 36.7 (19 Aug 2021 18:07)  T(F): 98.1 (19 Aug 2021 22:10), Max: 98.1 (19 Aug 2021 18:07)  HR: 62 (20 Aug 2021 00:24) (55 - 78)  BP: 105/52 (20 Aug 2021 00:24) (100/49 - 158/65)  BP(mean): 72 (20 Aug 2021 00:24) (70 - 93)  RR: 18 (20 Aug 2021 00:24) (12 - 27)  SpO2: 96% (20 Aug 2021 00:24) (94% - 99%)    I&O's Summary    18 Aug 2021 07:01  -  19 Aug 2021 07:00  --------------------------------------------------------  IN: 2000 mL / OUT: 1215 mL / NET: 785 mL    19 Aug 2021 07:01  -  20 Aug 2021 00:29  --------------------------------------------------------  IN: 420 mL / OUT: 1900 mL / NET: -1480 mL      PHYSICAL EXAM:  GEN: laying in bed, appears well, NAD  NEURO: AOx3. FC, OE spont, speech intact, face symmetric. CNII-XII intact. PERRL, EOMI. No pronator drift. MAEx4. 5/5 strength throughout (RLE not tested 2/2 groin access). SILT  CV: RRR +S1/S2  PULM: CTAB  GI: Abd soft, NT/ND  EXT: ext warm, dry, nontender  WOUND/DRAINS: R groin c/d/i. DP/PT pulses 2+ b/l      TUBES/LINES:  [] Crowley  [] A-line  [] Lumbar Drain  [] Wound Drains  [] NGT   [] EVD   [] CVC  [] Other      DIET:  [] NPO  [x] Mechanical  [] Tube feeds    LABS:                        10.9   7.69  )-----------( 227      ( 19 Aug 2021 05:16 )             33.0     08-19    139  |  106  |  12  ----------------------------<  141<H>  4.2   |  24  |  0.59    Ca    8.5      19 Aug 2021 05:16  Phos  3.4     08-19  Mg     1.8     08-19        Urinalysis Basic - ( 18 Aug 2021 19:13 )    Color: Yellow / Appearance: SL Cloudy / SG: <=1.005 / pH: x  Gluc: x / Ketone: NEGATIVE  / Bili: Negative / Urobili: 0.2 E.U./dL   Blood: x / Protein: Trace mg/dL / Nitrite: NEGATIVE   Leuk Esterase: Trace / RBC: < 5 /HPF / WBC Many /HPF   Sq Epi: x / Non Sq Epi: 0-1 /HPF / Bacteria: Present /HPF          CAPILLARY BLOOD GLUCOSE      POCT Blood Glucose.: 125 mg/dL (19 Aug 2021 21:17)  POCT Blood Glucose.: 155 mg/dL (19 Aug 2021 16:44)  POCT Blood Glucose.: 131 mg/dL (19 Aug 2021 10:59)  POCT Blood Glucose.: 123 mg/dL (19 Aug 2021 06:28)      Drug Levels: [] N/A    CSF Analysis: [] N/A      Allergies    No Known Allergies    Intolerances        Home Medications:  aspirin 81 mg oral tablet, chewable: 1 tab(s) orally once a day (18 Aug 2021 15:37)  atenolol 50 mg oral tablet: 1 tab(s) orally once a day (18 Aug 2021 15:36)  Calcium 600+D 600 mg-5 mcg (200 intl units) oral tablet: 1 tab(s) orally 3 times a day (18 Aug 2021 15:37)  Norvasc 10 mg oral tablet: 1 tab(s) orally once a day (18 Aug 2021 15:36)  Plavix 75 mg oral tablet: 1 tab(s) orally once a day (18 Aug 2021 15:37)      MEDICATIONS:  MEDICATIONS  (STANDING):  amLODIPine   Tablet 10 milliGRAM(s) Oral daily  aspirin 650 milliGRAM(s) Oral daily  ATENolol  Tablet 50 milliGRAM(s) Oral daily  calcium carbonate 1250 mG  + Vitamin D (OsCal 500 + D) 1 Tablet(s) Oral three times a day  chlorhexidine 4% Liquid 1 Application(s) Topical Once  clopidogrel Tablet 150 milliGRAM(s) Oral daily  insulin lispro (ADMELOG) corrective regimen sliding scale   SubCutaneous Before meals and at bedtime  pantoprazole    Tablet 40 milliGRAM(s) Oral before breakfast  senna 2 Tablet(s) Oral at bedtime  trimethoprim  160 mG/sulfamethoxazole 800 mG 1 Tablet(s) Oral two times a day    MEDICATIONS  (PRN):  acetaminophen   Tablet .. 650 milliGRAM(s) Oral every 6 hours PRN Temp greater or equal to 38.5C (101.3F), Moderate Pain (4 - 6)  acetaminophen 300 mG/codeine 30 mG 1 Tablet(s) Oral every 6 hours PRN Severe Pain (7 - 10)  diazepam    Tablet 5 milliGRAM(s) Oral every 8 hours PRN muscle spasm      CULTURES:  Culture Results:   >100,000 CFU/ml Gram Negative Rods  Identification and susceptibility to follow. (08-18 @ 17:09)      RADIOLOGY & ADDITIONAL TESTS:      Assessment:   71 year old female w/ HTN, Osteoarthritis, Anxiety, GERD, overactive bladder with headaches, found to have b/l PCOMM aneurysms on cerebral angiogram 04/2021 now s/p elective Flow diverter Pipeline stent (8/18/21).      Plan:  NEURO:  - neuro/vitals/groin/pulse checks  - pain control PRN, valium for back spasm  - cont , Plavix 150; Pending Accumetrics 8/20 at 12pm    CARDIOVASCULAR:  - normotensive SBP goal  - continue home meds: atenolol, amlodipine    PULMONARY:  - stable on RA    RENAL:  - IVL  - voiding     GI:  - ADAT  - bowel regimen  - PPI on steroids    HEME:  - SCDs for DVT ppx    ID:  - f/u urine culture  - started on Bactrim x 3 days for + UA    ENDO:  - ISS    DISPO:  - full code, SDU  - dispo pending home     Discussed with Dr. Jc      Assessment: present when checked     [] GCS   E   V   M     Heart Failure: [] Acute, [] acute on chronic, [] chronic   Heart Failure: [] Diastolic (HFpEF), [] Systolic (HRrEF), [] Combined (HFpEF and HFrEF), [] RHF, [] Pulm HTN, [] Other     [] TOM, [] ATN, [] AIN, [] other   [] CKD1, [] CKD2, [] CKD3, [] CKD4, [] CKD5, [] ESRD     Encephalopathy: [] Metabolic, [] Hepatic, [] Toxic, [] Neurological, [] Other     Abnormal Nutritional Status: [] malnutrition (see nutrition note), []underweight: BMI <19, [] morbid obesity: BMI >40, [] Cachexia     [] Sepsis   [] Hypovolemic shock, [] Cardiogenic shock, [] Hemorrhagic shock, [] Neurogenic shock   [] Acute respiratory failure   [] Cerebral edema, [] Brain compression / herniation   [] Functional quadriplegia   [] Acute blood loss anemia

## 2021-08-20 NOTE — DISCHARGE NOTE NURSING/CASE MANAGEMENT/SOCIAL WORK - NSDCFUADDAPPT_GEN_ALL_CORE_FT
Please call Dr. Jc's office to schedule your follow up appointment.    Please also follow up with your primary care doctor.

## 2021-08-20 NOTE — DISCHARGE NOTE NURSING/CASE MANAGEMENT/SOCIAL WORK - PATIENT PORTAL LINK FT
You can access the FollowMyHealth Patient Portal offered by [facility] by registering at the following website: [email or web address]. By joining [employer]’s FollowMyHealth portal, you will also be able to view your health information using other applications (apps) compatible with our system.

## 2021-08-20 NOTE — CONSULT NOTE ADULT - ASSESSMENT
Mike female, s/p stent placement...pt was supposed to be on Aspirin 650 mg daily at home...unclear compliance as her Aspirin plt response is NOT therapeutic...Pt to be given another dose of Aspirin now 650 mg, with Protonix for GI protection
71 year old female w/ HTN, Osteoarthritis, Anxiety, GERD, overactive bladder with headaches, found to have b/l PCOMM aneurysms on cerebral angiogram 04/2021 now s/p elective Flow diverter Pipeline stent (8/18/21).

## 2021-08-20 NOTE — CONSULT NOTE ADULT - SUBJECTIVE AND OBJECTIVE BOX
Patient is a 71y old  Female who presents with a chief complaint of stent placement (19 Aug 2021 11:17)      HPI:  71 year old female w/ Osteoarthritis, HTN, Anxiety, GERD presents for elective cerebral angiography. Patient reports chronic neck and head pain, but otherwise denies visual or hearing changes, extremity weakness or numbness, or gait changes. 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 She presents today for Pipeline stent placement for likely right PCOMM aneurysm. She has been on Aspirin and Plavix in anticipation of this procedure. (18 Aug 2021 15:07)    Subjective:      Allergies    No Known Allergies    Intolerances        MEDICATIONS  (STANDING):  amLODIPine   Tablet 10 milliGRAM(s) Oral daily  aspirin 650 milliGRAM(s) Oral daily  ATENolol  Tablet 50 milliGRAM(s) Oral daily  calcium carbonate 1250 mG  + Vitamin D (OsCal 500 + D) 1 Tablet(s) Oral three times a day  chlorhexidine 4% Liquid 1 Application(s) Topical Once  clopidogrel Tablet 150 milliGRAM(s) Oral daily  insulin lispro (ADMELOG) corrective regimen sliding scale   SubCutaneous Before meals and at bedtime  pantoprazole    Tablet 40 milliGRAM(s) Oral before breakfast  senna 2 Tablet(s) Oral at bedtime  trimethoprim  160 mG/sulfamethoxazole 800 mG 1 Tablet(s) Oral two times a day    MEDICATIONS  (PRN):  acetaminophen   Tablet .. 650 milliGRAM(s) Oral every 6 hours PRN Temp greater or equal to 38.5C (101.3F), Moderate Pain (4 - 6)  acetaminophen 300 mG/codeine 30 mG 1 Tablet(s) Oral every 6 hours PRN Severe Pain (7 - 10)  diazepam    Tablet 5 milliGRAM(s) Oral every 8 hours PRN muscle spasm        Drug Dosing Weight      PAST MEDICAL & SURGICAL HISTORY:  HTN (hypertension)    GERD (gastroesophageal reflux disease)    Anxiety    Osteoporosis    Overactive bladder    History of cholecystectomy    H/O shoulder surgery        FAMILY HISTORY:  No pertinent family history in first degree relatives        SOCIAL HISTORY:    ADVANCE DIRECTIVES:  Vital Signs Last 24 Hrs  T(C): 36.4 (20 Aug 2021 05:00), Max: 36.7 (19 Aug 2021 18:07)  T(F): 97.5 (20 Aug 2021 05:00), Max: 98.1 (19 Aug 2021 18:07)  HR: 56 (20 Aug 2021 03:43) (56 - 78)  BP: 113/54 (20 Aug 2021 03:43) (105/52 - 142/59)  BP(mean): 78 (20 Aug 2021 03:43) (72 - 87)  ABP: 114/42 (19 Aug 2021 10:03) (114/42 - 124/48)  ABP(mean): 66 (19 Aug 2021 10:03) (66 - 78)  RR: 18 (20 Aug 2021 03:43) (15 - 27)  SpO2: 98% (20 Aug 2021 03:43) (94% - 99%)          I&O's Detail    19 Aug 2021 07:01  -  20 Aug 2021 07:00  --------------------------------------------------------  IN:    Oral Fluid: 220 mL    sodium chloride 0.9%: 200 mL  Total IN: 420 mL    OUT:    Voided (mL): 3300 mL  Total OUT: 3300 mL    Total NET: -2880 mL          PHYSICAL EXAM:      Constitutional:    Eyes:    ENMT:    Neck:    Breasts:    Back:    Respiratory:    Cardiovascular:    Gastrointestinal:    Genitourinary:    Rectal:    Extremities:    Vascular:    Neurological:    Skin:    Lymph Nodes:    Musculoskeletal:    Psychiatric:        LABS:  CBC Full  -  ( 20 Aug 2021 06:15 )  WBC Count : 8.60 K/uL  RBC Count : 3.60 M/uL  Hemoglobin : 11.0 g/dL  Hematocrit : 33.1 %  Platelet Count - Automated : 214 K/uL  Mean Cell Volume : 91.9 fl  Mean Cell Hemoglobin : 30.6 pg  Mean Cell Hemoglobin Concentration : 33.2 gm/dL  Auto Neutrophil # : x  Auto Lymphocyte # : x  Auto Monocyte # : x  Auto Eosinophil # : x  Auto Basophil # : x  Auto Neutrophil % : x  Auto Lymphocyte % : x  Auto Monocyte % : x  Auto Eosinophil % : x  Auto Basophil % : x    08-20    142  |  106  |  14  ----------------------------<  95  3.9   |  29  |  0.96    Ca    9.4      20 Aug 2021 06:15  Phos  3.2     08-20  Mg     1.9     08-20      CAPILLARY BLOOD GLUCOSE      POCT Blood Glucose.: 94 mg/dL (20 Aug 2021 06:44)      Urinalysis Basic - ( 18 Aug 2021 19:13 )    Color: Yellow / Appearance: SL Cloudy / SG: <=1.005 / pH: x  Gluc: x / Ketone: NEGATIVE  / Bili: Negative / Urobili: 0.2 E.U./dL   Blood: x / Protein: Trace mg/dL / Nitrite: NEGATIVE   Leuk Esterase: Trace / RBC: < 5 /HPF / WBC Many /HPF   Sq Epi: x / Non Sq Epi: 0-1 /HPF / Bacteria: Present /HPF        EKG:    ECHO, US:    RADIOLOGY:    CRITICAL CARE TIME SPENT:  
HPI:  71 year old female w/ Osteoarthritis, HTN, Anxiety, GERD presents for elective cerebral angiography. Patient reports chronic neck and head pain, but otherwise denies visual or hearing changes, extremity weakness or numbness, or gait changes. 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 She presents today for Pipeline stent placement for likely right PCOMM aneurysm. She has been on Aspirin and Plavix in anticipation of this procedure. (18 Aug 2021 15:07)      PAST MEDICAL & SURGICAL HISTORY:  HTN (hypertension)    GERD (gastroesophageal reflux disease)    Anxiety    Osteoporosis    Overactive bladder    History of cholecystectomy    H/O shoulder surgery         Review of Systems:  · General	negative  · Skin/Breast	negative  · Ophthalmologic	negative  · ENMT	negative  · Respiratory and Thorax	negative  · Cardiovascular	negative  · Gastrointestinal	negative  · Genitourinary	negative  · Musculoskeletal Comments	negative  · Neurological	negative      MEDICATIONS  (STANDING):  amLODIPine   Tablet 10 milliGRAM(s) Oral daily  aspirin 650 milliGRAM(s) Oral once  ATENolol  Tablet 50 milliGRAM(s) Oral daily  calcium carbonate 1250 mG  + Vitamin D (OsCal 500 + D) 1 Tablet(s) Oral three times a day  chlorhexidine 4% Liquid 1 Application(s) Topical Once  clopidogrel Tablet 150 milliGRAM(s) Oral once  insulin lispro (ADMELOG) corrective regimen sliding scale   SubCutaneous Before meals and at bedtime  senna 2 Tablet(s) Oral at bedtime  trimethoprim  160 mG/sulfamethoxazole 800 mG 1 Tablet(s) Oral two times a day    MEDICATIONS  (PRN):  acetaminophen   Tablet .. 650 milliGRAM(s) Oral every 6 hours PRN Temp greater or equal to 38.5C (101.3F), Moderate Pain (4 - 6)      Allergies    No Known Allergies    Intolerances        SOCIAL HISTORY:    FAMILY HISTORY:  No pertinent family history in first degree relatives        Vital Signs Last 24 Hrs  T(C): 36.6 (19 Aug 2021 06:00), Max: 36.6 (19 Aug 2021 06:00)  T(F): 97.8 (19 Aug 2021 06:00), Max: 97.8 (19 Aug 2021 06:00)  HR: 61 (19 Aug 2021 11:02) (55 - 71)  BP: 109/55 (19 Aug 2021 11:02) (100/49 - 158/65)  BP(mean): 75 (19 Aug 2021 11:02) (70 - 93)  RR: 20 (19 Aug 2021 11:02) (12 - 38)  SpO2: 98% (19 Aug 2021 11:02) (95% - 99%)     Physical Exam:  · Constitutional	detailed exam  · Constitutional Details	well-developed; well-groomed  · Eyes	EOMI; PERRL; no drainage or redness  · ENMT Comments	dry mucous membranes  · Respiratory	detailed exam  · Respiratory Comments	normal breath sounds at the lung bases bilaterally  · Cardiovascular	Regular rate & rhythm, normal S1, S2; no murmurs, gallops or rubs; no S3, S4  · Abd-Soft non tender  ·Ext-no edema, clubbing or cyanosis      LABS:                        10.9   7.69  )-----------( 227      ( 19 Aug 2021 05:16 )             33.0     08-19    139  |  106  |  12  ----------------------------<  141<H>  4.2   |  24  |  0.59    Ca    8.5      19 Aug 2021 05:16  Phos  3.4     08-19  Mg     1.8     08-19        Urinalysis Basic - ( 18 Aug 2021 19:13 )    Color: Yellow / Appearance: SL Cloudy / SG: <=1.005 / pH: x  Gluc: x / Ketone: NEGATIVE  / Bili: Negative / Urobili: 0.2 E.U./dL   Blood: x / Protein: Trace mg/dL / Nitrite: NEGATIVE   Leuk Esterase: Trace / RBC: < 5 /HPF / WBC Many /HPF   Sq Epi: x / Non Sq Epi: 0-1 /HPF / Bacteria: Present /HPF        RADIOLOGY & ADDITIONAL STUDIES:

## 2021-08-20 NOTE — DISCHARGE NOTE NURSING/CASE MANAGEMENT/SOCIAL WORK - NSDCPEFALRISK_GEN_ALL_CORE
For information on Fall & injury Prevention, visit [email or web address]

## 2021-08-20 NOTE — PATIENT PROFILE ADULT - STATED REASON FOR ADMISSION
Subjective:      Patient ID: Raysa Paulson is a 67 y.o. female. HPI Patient presents for re-evaluation of chronic health problems. Pt will like to discuss lower left leg pain and swelling, ongoing for 2 days. Patient denies any injury to the leg. All her discomfort is on the outer aspect of the calf. She denies any swelling problems. Patient also was to discuss that she's been having intermittent chest pain for the last several weeks. Patient describes with exertion or straining she will have chest discomfort. She describes the chest pain is in the center portion of her chest with some radiation to her arms. She does not become short of breath associated with this. Patient states this feels like her typical angina. She has been taking nitroglycerin when this occurs. Patient also relates that she stopped taking her cholesterol medication within the last year and she's concerned whether she should restart it. She also has been having recurrent episodes of diarrhea and recently noticed some blood in her stools. Patient has been hospitalized several times in the last 6 months and treated with antibiotic therapy. Patient denies any nighttime symptoms. No one else at home has diarrhea.     Review of Systems  Patient Active Problem List   Diagnosis    Paroxysmal atrial fibrillation (HCC)    Coronary artery disease involving native heart with angina pectoris ([address]    Essential hypertension, benign    Mixed hyperlipidemia    Chronic gouty arthropathy    Hypertension    Acquired hypothyroidism    Arthritis    Heart valve problem    Restrictive lung disease    Sick sinus syndrome ([address]    Allergic rhinitis    Fibrocystic breast    Cerebrovascular accident (CVA) ([address]    Pacemaker    Typical atrial flutter ([address]    Primary osteoarthritis involving multiple joints    Vitamin D deficiency    Tremor    Dyspnea       Outpatient Prescriptions Marked as Taking for the 3/14/18 encounter (Office Visit) with Issac Kinsey MD   Medication Sig Dispense Refill    levothyroxine (SYNTHROID) 137 MCG tablet TAKE 1 TABLET DAILY 90 tablet 0    topiramate (TOPAMAX) 100 MG tablet Take 1 tablet by mouth 2 times daily 180 tablet 3    amLODIPine (NORVASC) 10 MG tablet Take 1 tablet by mouth daily 90 tablet 2    clopidogrel (PLAVIX) 75 MG tablet Take 1 tablet by mouth daily 90 tablet 2    nitroGLYCERIN (NITROLINGUAL) 0.4 MG/SPRAY 0.4 mg spray Place 1 spray under the tongue every 5 minutes as needed for Chest pain 1 Bottle 3    metoprolol tartrate (LOPRESSOR) 100 MG tablet Take 1 tablet by mouth 2 times daily 180 tablet 3    allopurinol (ZYLOPRIM) 300 MG tablet Take 1 tablet by mouth daily 90 tablet 1    allopurinol (ZYLOPRIM) 100 MG tablet Take 1 tablet by mouth daily 90 tablet 1    vitamin D (ERGOCALCIFEROL) 31583 units CAPS capsule Take 1 capsule by mouth once a week 12 capsule 1    furosemide (LASIX) 20 MG tablet Take 20 mg by mouth as needed      rivaroxaban (XARELTO) 15 MG TABS tablet Take 1 tablet by mouth daily (with breakfast) 90 tablet 3    tiZANidine (ZANAFLEX) 4 MG tablet Take 4 mg by mouth every 6 hours as needed          Allergies   Allergen Reactions    Aspirin Nausea Only    Ativan [Lorazepam] Other (See Comments)     hallucinations    Diltiazem Anaphylaxis    Diltiazem Hcl Anaphylaxis    Dabigatran Nausea Only     And indigestion    Dabigatran Etexilate Mesylate Other (See Comments)     indigestion    Dabigatran Etexilate Mesylate Nausea Only     And indigestion    Iodides Other (See Comments)     Kidney disease    Mysoline [Primidone]     Nsaids     Other Other (See Comments)     Nitroglycerin patches causes severe headaches    Sulfa Antibiotics Rash       Social History   Substance Use Topics    Smoking status: Former Smoker     Packs/day: 1.00     Years: 28.00     Types: Cigarettes     Quit date: 1/1/1987    Smokeless tobacco: Never Used      Comment: H.O.smoking at age 14 daily for 10 days    OARRS report checked          Plan:      Laboratory profiling ordered and suspect patient will need to restart cholesterol medications  Patient will be referred to cardiology for urgent evaluation  Local heat and elevation to this area superficial phlebitis  Begin a treatment of antibiotic therapy for possible C. Diff colitis and if her symptoms persist we will need to pursue stool cultures. Maintain other medications with no changes  RTC 3 months pending evaluation. Please note that this chart was generated using Dragon dictation software. Although every effort was made to ensure the accuracy of this automated transcription, some errors in transcription may have occurred. Aneurysm

## 2021-08-21 DIAGNOSIS — N39.0 URINARY TRACT INFECTION, SITE NOT SPECIFIED: ICD-10-CM

## 2021-08-21 DIAGNOSIS — Z51.89 ENCOUNTER FOR OTHER SPECIFIED AFTERCARE: ICD-10-CM

## 2021-08-21 LAB
ANION GAP SERPL CALC-SCNC: 10 MMOL/L — SIGNIFICANT CHANGE UP (ref 5–17)
BUN SERPL-MCNC: 14 MG/DL — SIGNIFICANT CHANGE UP (ref 7–23)
CALCIUM SERPL-MCNC: 9.4 MG/DL — SIGNIFICANT CHANGE UP (ref 8.4–10.5)
CHLORIDE SERPL-SCNC: 102 MMOL/L — SIGNIFICANT CHANGE UP (ref 96–108)
CO2 SERPL-SCNC: 29 MMOL/L — SIGNIFICANT CHANGE UP (ref 22–31)
CREAT SERPL-MCNC: 1 MG/DL — SIGNIFICANT CHANGE UP (ref 0.5–1.3)
GLUCOSE BLDC GLUCOMTR-MCNC: 100 MG/DL — HIGH (ref 70–99)
GLUCOSE BLDC GLUCOMTR-MCNC: 108 MG/DL — HIGH (ref 70–99)
GLUCOSE BLDC GLUCOMTR-MCNC: 108 MG/DL — HIGH (ref 70–99)
GLUCOSE BLDC GLUCOMTR-MCNC: 95 MG/DL — SIGNIFICANT CHANGE UP (ref 70–99)
GLUCOSE SERPL-MCNC: 101 MG/DL — HIGH (ref 70–99)
HCT VFR BLD CALC: 37.4 % — SIGNIFICANT CHANGE UP (ref 34.5–45)
HGB BLD-MCNC: 12.2 G/DL — SIGNIFICANT CHANGE UP (ref 11.5–15.5)
MAGNESIUM SERPL-MCNC: 2 MG/DL — SIGNIFICANT CHANGE UP (ref 1.6–2.6)
MCHC RBC-ENTMCNC: 30 PG — SIGNIFICANT CHANGE UP (ref 27–34)
MCHC RBC-ENTMCNC: 32.6 GM/DL — SIGNIFICANT CHANGE UP (ref 32–36)
MCV RBC AUTO: 91.9 FL — SIGNIFICANT CHANGE UP (ref 80–100)
NRBC # BLD: 0 /100 WBCS — SIGNIFICANT CHANGE UP (ref 0–0)
PA ADP PRP-ACNC: 3 PRU — LOW (ref 194–418)
PHOSPHATE SERPL-MCNC: 3.7 MG/DL — SIGNIFICANT CHANGE UP (ref 2.5–4.5)
PLATELET # BLD AUTO: 249 K/UL — SIGNIFICANT CHANGE UP (ref 150–400)
POTASSIUM SERPL-MCNC: 4.2 MMOL/L — SIGNIFICANT CHANGE UP (ref 3.5–5.3)
POTASSIUM SERPL-SCNC: 4.2 MMOL/L — SIGNIFICANT CHANGE UP (ref 3.5–5.3)
RBC # BLD: 4.07 M/UL — SIGNIFICANT CHANGE UP (ref 3.8–5.2)
RBC # FLD: 14 % — SIGNIFICANT CHANGE UP (ref 10.3–14.5)
SODIUM SERPL-SCNC: 141 MMOL/L — SIGNIFICANT CHANGE UP (ref 135–145)
WBC # BLD: 6.97 K/UL — SIGNIFICANT CHANGE UP (ref 3.8–10.5)
WBC # FLD AUTO: 6.97 K/UL — SIGNIFICANT CHANGE UP (ref 3.8–10.5)

## 2021-08-21 PROCEDURE — 99232 SBSQ HOSP IP/OBS MODERATE 35: CPT

## 2021-08-21 PROCEDURE — 99232 SBSQ HOSP IP/OBS MODERATE 35: CPT | Mod: GC

## 2021-08-21 RX ORDER — MULTIVIT WITH MIN/MFOLATE/K2 340-15/3 G
1 POWDER (GRAM) ORAL ONCE
Refills: 0 | Status: COMPLETED | OUTPATIENT
Start: 2021-08-21 | End: 2021-08-21

## 2021-08-21 RX ORDER — ASPIRIN/CALCIUM CARB/MAGNESIUM 324 MG
1 TABLET ORAL
Qty: 0 | Refills: 0 | DISCHARGE

## 2021-08-21 RX ORDER — ASPIRIN/CALCIUM CARB/MAGNESIUM 324 MG
325 TABLET ORAL DAILY
Refills: 0 | Status: DISCONTINUED | OUTPATIENT
Start: 2021-08-22 | End: 2021-08-22

## 2021-08-21 RX ADMIN — CLOPIDOGREL BISULFATE 75 MILLIGRAM(S): 75 TABLET, FILM COATED ORAL at 06:05

## 2021-08-21 RX ADMIN — Medication 1 BOTTLE: at 15:24

## 2021-08-21 RX ADMIN — Medication 650 MILLIGRAM(S): at 06:05

## 2021-08-21 RX ADMIN — AMLODIPINE BESYLATE 10 MILLIGRAM(S): 2.5 TABLET ORAL at 06:04

## 2021-08-21 RX ADMIN — Medication 1 TABLET(S): at 21:31

## 2021-08-21 RX ADMIN — Medication 1 TABLET(S): at 12:11

## 2021-08-21 RX ADMIN — SIMETHICONE 80 MILLIGRAM(S): 80 TABLET, CHEWABLE ORAL at 09:22

## 2021-08-21 RX ADMIN — Medication 30 MILLILITER(S): at 09:22

## 2021-08-21 RX ADMIN — Medication 1 TABLET(S): at 06:05

## 2021-08-21 RX ADMIN — PANTOPRAZOLE SODIUM 40 MILLIGRAM(S): 20 TABLET, DELAYED RELEASE ORAL at 06:05

## 2021-08-21 RX ADMIN — ATENOLOL 50 MILLIGRAM(S): 25 TABLET ORAL at 06:05

## 2021-08-21 RX ADMIN — Medication 1 TABLET(S): at 01:30

## 2021-08-21 NOTE — PROGRESS NOTE ADULT - SUBJECTIVE AND OBJECTIVE BOX
NEUROSURGERY DOCUMENTATION:    Patient is S/P day 3 pipeline flow diverter stent placement to R. ICA via femoral cerebral angiogram. Accumetric testing for aspirin and Plavix is being performed daily.    Results of P2Y12 shows that Plavix is super therapeutic after receiving doses in hospital.  This suggests that patient was not complaint with hose dosing.  Patient and her daughter were extensively educated and counseled on the need to take her Plavix and Aspirin as prescribed .

## 2021-08-21 NOTE — PROGRESS NOTE ADULT - SUBJECTIVE AND OBJECTIVE BOX
Patient is a 71y old  Female who presents with a chief complaint of stent placement (19 Aug 2021 11:17)      HPI:  71 year old female w/ Osteoarthritis, HTN, Anxiety, GERD presents for elective cerebral angiography. Patient reports chronic neck and head pain, but otherwise denies visual or hearing changes, extremity weakness or numbness, or gait changes. 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 She presents today for Pipeline stent placement for likely right PCOMM aneurysm. She has been on Aspirin and Plavix in anticipation of this procedure. (18 Aug 2021 15:07)    Subjective:  Pt has no acute complaints. Denies SOB, CP. ROS is otherwise negative.     Allergies    No Known Allergies    Intolerances      MEDICATIONS  (STANDING):  amLODIPine   Tablet 10 milliGRAM(s) Oral daily  aspirin 650 milliGRAM(s) Oral daily  ATENolol  Tablet 50 milliGRAM(s) Oral daily  calcium carbonate 1250 mG  + Vitamin D (OsCal 500 + D) 1 Tablet(s) Oral three times a day  chlorhexidine 4% Liquid 1 Application(s) Topical Once  clopidogrel Tablet 75 milliGRAM(s) Oral daily  insulin lispro (ADMELOG) corrective regimen sliding scale   SubCutaneous Before meals and at bedtime  pantoprazole    Tablet 40 milliGRAM(s) Oral before breakfast  senna 2 Tablet(s) Oral at bedtime  trimethoprim  160 mG/sulfamethoxazole 800 mG 1 Tablet(s) Oral two times a day    MEDICATIONS  (PRN):  acetaminophen   Tablet .. 650 milliGRAM(s) Oral every 6 hours PRN Temp greater or equal to 38.5C (101.3F), Moderate Pain (4 - 6)  acetaminophen 300 mG/codeine 30 mG 1 Tablet(s) Oral every 6 hours PRN Severe Pain (7 - 10)  aluminum hydroxide/magnesium hydroxide/simethicone Suspension 30 milliLiter(s) Oral every 4 hours PRN Dyspepsia  diazepam    Tablet 5 milliGRAM(s) Oral every 8 hours PRN muscle spasm  simethicone 80 milliGRAM(s) Chew every 12 hours PRN Gas        Drug Dosing Weight      PAST MEDICAL & SURGICAL HISTORY:  HTN (hypertension)    GERD (gastroesophageal reflux disease)    Anxiety    Osteoporosis    Overactive bladder    History of cholecystectomy    H/O shoulder surgery        FAMILY HISTORY:  No pertinent family history in first degree relatives        SOCIAL HISTORY:    ADVANCE DIRECTIVES:      Vital Signs Last 24 Hrs  T(C): 36.6 (21 Aug 2021 05:27), Max: 36.9 (20 Aug 2021 17:54)  T(F): 97.8 (21 Aug 2021 05:27), Max: 98.4 (20 Aug 2021 17:54)  HR: 76 (21 Aug 2021 08:29) (58 - 76)  BP: 114/58 (21 Aug 2021 08:25) (106/58 - 125/59)  BP(mean): 83 (21 Aug 2021 08:25) (71 - 85)  RR: 18 (21 Aug 2021 08:25) (16 - 18)  SpO2: 95% (21 Aug 2021 08:29) (94% - 97%)        PHYSICAL EXAM:      Constitutional:    Eyes:    ENMT:    Neck:    Breasts:    Back:    Respiratory:    Cardiovascular:    Gastrointestinal:    Genitourinary:    Rectal:    Extremities:    Vascular:    Neurological:    Skin:    Lymph Nodes:    Musculoskeletal:    Psychiatric:        LABS:                            12.2   6.97  )-----------( 249      ( 21 Aug 2021 06:59 )             37.4   08-21    141  |  102  |  14  ----------------------------<  101<H>  4.2   |  29  |  1.00    Ca    9.4      21 Aug 2021 06:59  Phos  3.7     08-21  Mg     2.0     08-21          EKG:    ECHO, US:    RADIOLOGY:    CRITICAL CARE TIME SPENT:   Patient is a 71y old  Female who presents with a chief complaint of stent placement (19 Aug 2021 11:17)      HPI:  71 year old female w/ Osteoarthritis, HTN, Anxiety, GERD presents for elective cerebral angiography. Patient reports chronic neck and head pain, but otherwise denies visual or hearing changes, extremity weakness or numbness, or gait changes. 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 She presents today for Pipeline stent placement for likely right PCOMM aneurysm. She has been on Aspirin and Plavix in anticipation of this procedure. (18 Aug 2021 15:07)    Subjective:  Pt reports suprapubic pain as well as some back pain. Denies SOB, CP. ROS is otherwise negative.     Allergies    No Known Allergies    Intolerances      MEDICATIONS  (STANDING):  amLODIPine   Tablet 10 milliGRAM(s) Oral daily  aspirin 650 milliGRAM(s) Oral daily  ATENolol  Tablet 50 milliGRAM(s) Oral daily  calcium carbonate 1250 mG  + Vitamin D (OsCal 500 + D) 1 Tablet(s) Oral three times a day  chlorhexidine 4% Liquid 1 Application(s) Topical Once  clopidogrel Tablet 75 milliGRAM(s) Oral daily  insulin lispro (ADMELOG) corrective regimen sliding scale   SubCutaneous Before meals and at bedtime  pantoprazole    Tablet 40 milliGRAM(s) Oral before breakfast  senna 2 Tablet(s) Oral at bedtime  trimethoprim  160 mG/sulfamethoxazole 800 mG 1 Tablet(s) Oral two times a day    MEDICATIONS  (PRN):  acetaminophen   Tablet .. 650 milliGRAM(s) Oral every 6 hours PRN Temp greater or equal to 38.5C (101.3F), Moderate Pain (4 - 6)  acetaminophen 300 mG/codeine 30 mG 1 Tablet(s) Oral every 6 hours PRN Severe Pain (7 - 10)  aluminum hydroxide/magnesium hydroxide/simethicone Suspension 30 milliLiter(s) Oral every 4 hours PRN Dyspepsia  diazepam    Tablet 5 milliGRAM(s) Oral every 8 hours PRN muscle spasm  simethicone 80 milliGRAM(s) Chew every 12 hours PRN Gas        Drug Dosing Weight      PAST MEDICAL & SURGICAL HISTORY:  HTN (hypertension)    GERD (gastroesophageal reflux disease)    Anxiety    Osteoporosis    Overactive bladder    History of cholecystectomy    H/O shoulder surgery        FAMILY HISTORY:  No pertinent family history in first degree relatives        SOCIAL HISTORY:    ADVANCE DIRECTIVES:      Vital Signs Last 24 Hrs  T(C): 36.6 (21 Aug 2021 05:27), Max: 36.9 (20 Aug 2021 17:54)  T(F): 97.8 (21 Aug 2021 05:27), Max: 98.4 (20 Aug 2021 17:54)  HR: 76 (21 Aug 2021 08:29) (58 - 76)  BP: 114/58 (21 Aug 2021 08:25) (106/58 - 125/59)  BP(mean): 83 (21 Aug 2021 08:25) (71 - 85)  RR: 18 (21 Aug 2021 08:25) (16 - 18)  SpO2: 95% (21 Aug 2021 08:29) (94% - 97%)        PHYSICAL EXAM:      Constitutional: NAD  Eyes: PERRLA  ENMT: MMM  Neck: supple  Back: midline  Respiratory: CTA b/l  Cardiovascular: rrr, s1s2, no m/r/g  Gastrointestinal: soft, NTND, + BS  Genitourinary: + suprapubic TTP  Extremities: wwp  Vascular: + 2 pulses radial  Neurological: AAO x 4  Skin: no rash  Lymph Nodes: no LAD  Musculoskeletal: no joint swelling  Psychiatric: normal affect        LABS:                            12.2   6.97  )-----------( 249      ( 21 Aug 2021 06:59 )             37.4   08-21    141  |  102  |  14  ----------------------------<  101<H>  4.2   |  29  |  1.00    Ca    9.4      21 Aug 2021 06:59  Phos  3.7     08-21  Mg     2.0     08-21          EKG:    ECHO, US:    RADIOLOGY:    CRITICAL CARE TIME SPENT:

## 2021-08-21 NOTE — PROGRESS NOTE ADULT - SUBJECTIVE AND OBJECTIVE BOX
HPI:  71 year old female w/ Osteoarthritis, HTN, Anxiety, GERD presents for elective cerebral angiography. Patient reports chronic neck and head pain, but otherwise denies visual or hearing changes, extremity weakness or numbness, or gait changes. 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 She presents today for Pipeline stent placement for likely right PCOMM aneurysm. She has been on Aspirin and Plavix in anticipation of this procedure. (18 Aug 2021 15:07)      Hospital course:   8/18: POD# 0 S/P femoral cerebral angiogram for R ICA pipeline stent for PComm aneurysm.  Post op with oozing from R groin site, compressed x 30 mins with improvement.   8/19: POD 1 s/p pipeline stent. Pending accumetrics after AM ASA/Plavix.   8/20: POD 2, TONI overnightm neuro stable. Pending repeat accumentrics this afternoon.  8/21: POD 3. TONI overnight. p2y12 resulted subtherapeutic, pending repeat accumetrics in the afternoon     Vital Signs Last 24 Hrs  T(C): 36.6 (20 Aug 2021 22:24), Max: 36.9 (20 Aug 2021 17:54)  T(F): 97.9 (20 Aug 2021 22:24), Max: 98.4 (20 Aug 2021 17:54)  HR: 64 (20 Aug 2021 20:21) (56 - 66)  BP: 123/56 (20 Aug 2021 20:21) (105/52 - 133/62)  BP(mean): 80 (20 Aug 2021 20:21) (71 - 89)  RR: 16 (20 Aug 2021 20:21) (16 - 18)  SpO2: 95% (20 Aug 2021 20:21) (90% - 98%)    I&O's Detail    19 Aug 2021 07:01  -  20 Aug 2021 07:00  --------------------------------------------------------  IN:    Oral Fluid: 220 mL    sodium chloride 0.9%: 200 mL  Total IN: 420 mL    OUT:    Voided (mL): 3300 mL  Total OUT: 3300 mL    Total NET: -2880 mL      20 Aug 2021 07:01  -  21 Aug 2021 00:03  --------------------------------------------------------  IN:    Oral Fluid: 240 mL  Total IN: 240 mL    OUT:    Voided (mL): 1450 mL  Total OUT: 1450 mL    Total NET: -1210 mL        I&O's Summary    19 Aug 2021 07:01  -  20 Aug 2021 07:00  --------------------------------------------------------  IN: 420 mL / OUT: 3300 mL / NET: -2880 mL    20 Aug 2021 07:01  -  21 Aug 2021 00:03  --------------------------------------------------------  IN: 240 mL / OUT: 1450 mL / NET: -1210 mL      PHYSICAL EXAM:  GEN: laying in bed, appears well, NAD  NEURO: AOx3. FC, OE spont, speech intact, face symmetric. CNII-XII intact. PERRL, EOMI. No pronator drift. MAEx4. 5/5 strength throughout. SILT  CV: RRR +S1/S2  PULM: CTAB  GI: Abd soft, NT/ND  EXT: ext warm, dry, nontender  WOUND/DRAINS: R groin c/d/i. DP/PT pulses 2+ b/l    TUBES/LINES:  [] CVC  [] A-line  [] Lumbar Drain  [] Ventriculostomy  [] Other    DIET:  [] NPO  [] Mechanical  [] Tube feeds    LABS:                        11.0   8.60  )-----------( 214      ( 20 Aug 2021 06:15 )             33.1     08-20    142  |  106  |  14  ----------------------------<  95  3.9   |  29  |  0.96    Ca    9.4      20 Aug 2021 06:15  Phos  3.2     08-20  Mg     1.9     08-20              CAPILLARY BLOOD GLUCOSE      POCT Blood Glucose.: 119 mg/dL (20 Aug 2021 21:11)  POCT Blood Glucose.: 87 mg/dL (20 Aug 2021 16:09)  POCT Blood Glucose.: 86 mg/dL (20 Aug 2021 11:44)  POCT Blood Glucose.: 94 mg/dL (20 Aug 2021 06:44)      Drug Levels: [] N/A    CSF Analysis: [] N/A      Allergies    No Known Allergies    Intolerances      MEDICATIONS:  Antibiotics:  trimethoprim  160 mG/sulfamethoxazole 800 mG 1 Tablet(s) Oral two times a day    Neuro:  acetaminophen   Tablet .. 650 milliGRAM(s) Oral every 6 hours PRN  acetaminophen 300 mG/codeine 30 mG 1 Tablet(s) Oral every 6 hours PRN  aspirin 650 milliGRAM(s) Oral daily  diazepam    Tablet 5 milliGRAM(s) Oral every 8 hours PRN    Anticoagulation:    OTHER:  amLODIPine   Tablet 10 milliGRAM(s) Oral daily  ATENolol  Tablet 50 milliGRAM(s) Oral daily  chlorhexidine 4% Liquid 1 Application(s) Topical Once  insulin lispro (ADMELOG) corrective regimen sliding scale   SubCutaneous Before meals and at bedtime  pantoprazole    Tablet 40 milliGRAM(s) Oral before breakfast  senna 2 Tablet(s) Oral at bedtime  simethicone 80 milliGRAM(s) Chew every 12 hours PRN    IVF:  calcium carbonate 1250 mG  + Vitamin D (OsCal 500 + D) 1 Tablet(s) Oral three times a day    CULTURES:  Culture Results:   >100,000 CFU/ml Klebsiella oxytoca (08-18 @ 17:09)    RADIOLOGY & ADDITIONAL TESTS:      ASSESSMENT:  71 year old female w/ HTN, Osteoarthritis, Anxiety, GERD, overactive bladder with headaches, found to have b/l PCOMM aneurysms on cerebral angiogram 04/2021 now s/p elective Flow diverter Pipeline stent (8/18/21).    [id number]    No pertinent family history in first degree relatives    Handoff    MEWS Score    No pertinent past medical history    No pertinent past medical history    HTN (hypertension)    GERD (gastroesophageal reflux disease)    Anxiety    Osteoporosis    Overactive bladder    Posterior communicating artery aneurysm    Posterior communicating artery aneurysm    Angiogram, cerebral, with embolization    Cerebral aneurysm    Cerebral aneurysm    HTN (hypertension)    GERD (gastroesophageal reflux disease)    Overactive bladder    Osteoporosis    Posterior communicating artery aneurysm    Encounter for monitoring aspirin therapy    Normocytic anemia    Stage 3 chronic kidney disease    Angiogram, cerebral, with embolization    History of cholecystectomy    H/O shoulder surgery    HTN (hypertension)    GERD (gastroesophageal reflux disease)    Overactive bladder    Osteoporosis    Normocytic anemia    Encounter for monitoring aspirin therapy    Stage 3 chronic kidney disease    SysAdmin_VstLnk        PLAN:  NEURO:  - neuro/vitals/groin/pulse checks  - pain control PRN, valium for back spasm  - cont , Plavix 75; Pending Accumetrics 8/21 at 12pm    CARDIOVASCULAR:  - normotensive SBP goal  - continue home meds: atenolol, amlodipine    PULMONARY:  - stable on RA    RENAL:  - IVL  - voiding     GI:  - ADAT  - bowel regimen  - PPI on steroids    HEME:  - SCDs for DVT ppx    ID:  - f/u urine culture  - started on Bactrim x 3 days for + UA    ENDO:  - ISS    DISPO:  - full code, SDU  - dispo pending home     Discussed with Dr. Jc      Assessment:  Present when checked    []  GCS  E   V  M     Heart Failure: []Acute, [] acute on chronic , []chronic  Heart Failure:  [] Diastolic (HFpEF), [] Systolic (HFrEF), []Combined (HFpEF and HFrEF), [] RHF, [] Pulm HTN, [] Other    [] TOM, [] ATN, [] AIN, [] other  [] CKD1, [] CKD2, [] CKD 3, [] CKD 4, [] CKD 5, []ESRD    Encephalopathy: [] Metabolic, [] Hepatic, [] toxic, [] Neurological, [] Other    Abnormal Nurtitional Status: [] malnurtition (see nutrition note), [ ]underweight: BMI < 19, [] morbid obesity: BMI >40, [] Cachexia    [] Sepsis  [] hypovolemic shock,[] cardiogenic shock, [] hemorrhagic shock, [] neuogenic shock  [] Acute Respiratory Failure  []Cerebral edema, [] Brain compression/ herniation,   [] Functional quadriplegia  [] Acute blood loss anemia   HPI:  71 year old female w/ Osteoarthritis, HTN, Anxiety, GERD presents for elective cerebral angiography. Patient reports chronic neck and head pain, but otherwise denies visual or hearing changes, extremity weakness or numbness, or gait changes. 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 She presents today for Pipeline stent placement for likely right PCOMM aneurysm. She has been on Aspirin and Plavix in anticipation of this procedure. (18 Aug 2021 15:07)      Hospital course:   8/18: POD# 0 S/P femoral cerebral angiogram for R ICA pipeline stent for PComm aneurysm.  Post op with oozing from R groin site, compressed x 30 mins with improvement.   8/19: POD 1 s/p pipeline stent. Pending accumetrics after AM ASA/Plavix.   8/20: POD 2, TONI overnightm neuro stable. Pending repeat accumentrics this afternoon.  8/21: POD 3. TONI overnight. p2y12 resulted supratherapeutic, pending repeat accumetrics in the afternoon     Vital Signs Last 24 Hrs  T(C): 36.6 (20 Aug 2021 22:24), Max: 36.9 (20 Aug 2021 17:54)  T(F): 97.9 (20 Aug 2021 22:24), Max: 98.4 (20 Aug 2021 17:54)  HR: 64 (20 Aug 2021 20:21) (56 - 66)  BP: 123/56 (20 Aug 2021 20:21) (105/52 - 133/62)  BP(mean): 80 (20 Aug 2021 20:21) (71 - 89)  RR: 16 (20 Aug 2021 20:21) (16 - 18)  SpO2: 95% (20 Aug 2021 20:21) (90% - 98%)    I&O's Detail    19 Aug 2021 07:01  -  20 Aug 2021 07:00  --------------------------------------------------------  IN:    Oral Fluid: 220 mL    sodium chloride 0.9%: 200 mL  Total IN: 420 mL    OUT:    Voided (mL): 3300 mL  Total OUT: 3300 mL    Total NET: -2880 mL      20 Aug 2021 07:01  -  21 Aug 2021 00:03  --------------------------------------------------------  IN:    Oral Fluid: 240 mL  Total IN: 240 mL    OUT:    Voided (mL): 1450 mL  Total OUT: 1450 mL    Total NET: -1210 mL        I&O's Summary    19 Aug 2021 07:01  -  20 Aug 2021 07:00  --------------------------------------------------------  IN: 420 mL / OUT: 3300 mL / NET: -2880 mL    20 Aug 2021 07:01  -  21 Aug 2021 00:03  --------------------------------------------------------  IN: 240 mL / OUT: 1450 mL / NET: -1210 mL      PHYSICAL EXAM:  GEN: laying in bed, appears well, NAD  NEURO: AOx3. FC, OE spont, speech intact, face symmetric. CNII-XII intact. PERRL, EOMI. No pronator drift. MAEx4. 5/5 strength throughout. SILT  CV: RRR +S1/S2  PULM: CTAB  GI: Abd soft, NT/ND  EXT: ext warm, dry, nontender  WOUND/DRAINS: R groin c/d/i. DP/PT pulses 2+ b/l    TUBES/LINES:  [] CVC  [] A-line  [] Lumbar Drain  [] Ventriculostomy  [] Other    DIET:  [] NPO  [] Mechanical  [] Tube feeds    LABS:                        11.0   8.60  )-----------( 214      ( 20 Aug 2021 06:15 )             33.1     08-20    142  |  106  |  14  ----------------------------<  95  3.9   |  29  |  0.96    Ca    9.4      20 Aug 2021 06:15  Phos  3.2     08-20  Mg     1.9     08-20              CAPILLARY BLOOD GLUCOSE      POCT Blood Glucose.: 119 mg/dL (20 Aug 2021 21:11)  POCT Blood Glucose.: 87 mg/dL (20 Aug 2021 16:09)  POCT Blood Glucose.: 86 mg/dL (20 Aug 2021 11:44)  POCT Blood Glucose.: 94 mg/dL (20 Aug 2021 06:44)      Drug Levels: [] N/A    CSF Analysis: [] N/A      Allergies    No Known Allergies    Intolerances      MEDICATIONS:  Antibiotics:  trimethoprim  160 mG/sulfamethoxazole 800 mG 1 Tablet(s) Oral two times a day    Neuro:  acetaminophen   Tablet .. 650 milliGRAM(s) Oral every 6 hours PRN  acetaminophen 300 mG/codeine 30 mG 1 Tablet(s) Oral every 6 hours PRN  aspirin 650 milliGRAM(s) Oral daily  diazepam    Tablet 5 milliGRAM(s) Oral every 8 hours PRN    Anticoagulation:    OTHER:  amLODIPine   Tablet 10 milliGRAM(s) Oral daily  ATENolol  Tablet 50 milliGRAM(s) Oral daily  chlorhexidine 4% Liquid 1 Application(s) Topical Once  insulin lispro (ADMELOG) corrective regimen sliding scale   SubCutaneous Before meals and at bedtime  pantoprazole    Tablet 40 milliGRAM(s) Oral before breakfast  senna 2 Tablet(s) Oral at bedtime  simethicone 80 milliGRAM(s) Chew every 12 hours PRN    IVF:  calcium carbonate 1250 mG  + Vitamin D (OsCal 500 + D) 1 Tablet(s) Oral three times a day    CULTURES:  Culture Results:   >100,000 CFU/ml Klebsiella oxytoca (08-18 @ 17:09)    RADIOLOGY & ADDITIONAL TESTS:      ASSESSMENT:  71 year old female w/ HTN, Osteoarthritis, Anxiety, GERD, overactive bladder with headaches, found to have b/l PCOMM aneurysms on cerebral angiogram 04/2021 now s/p elective Flow diverter Pipeline stent (8/18/21).    [id number]    No pertinent family history in first degree relatives    Handoff    MEWS Score    No pertinent past medical history    No pertinent past medical history    HTN (hypertension)    GERD (gastroesophageal reflux disease)    Anxiety    Osteoporosis    Overactive bladder    Posterior communicating artery aneurysm    Posterior communicating artery aneurysm    Angiogram, cerebral, with embolization    Cerebral aneurysm    Cerebral aneurysm    HTN (hypertension)    GERD (gastroesophageal reflux disease)    Overactive bladder    Osteoporosis    Posterior communicating artery aneurysm    Encounter for monitoring aspirin therapy    Normocytic anemia    Stage 3 chronic kidney disease    Angiogram, cerebral, with embolization    History of cholecystectomy    H/O shoulder surgery    HTN (hypertension)    GERD (gastroesophageal reflux disease)    Overactive bladder    Osteoporosis    Normocytic anemia    Encounter for monitoring aspirin therapy    Stage 3 chronic kidney disease    SysAdmin_VstLnk        PLAN:  NEURO:  - neuro/vitals/groin/pulse checks  - pain control PRN, valium for back spasm  - cont , Plavix 75; Pending Accumetrics 8/21 at 12pm    CARDIOVASCULAR:  - normotensive SBP goal  - continue home meds: atenolol, amlodipine    PULMONARY:  - stable on RA    RENAL:  - IVL  - voiding     GI:  - ADAT  - bowel regimen  - PPI on steroids    HEME:  - SCDs for DVT ppx    ID:  - f/u urine culture  - started on Bactrim x 3 days for + UA    ENDO:  - ISS    DISPO:  - full code, SDU  - dispo pending home     Discussed with Dr. Jc      Assessment:  Present when checked    []  GCS  E   V  M     Heart Failure: []Acute, [] acute on chronic , []chronic  Heart Failure:  [] Diastolic (HFpEF), [] Systolic (HFrEF), []Combined (HFpEF and HFrEF), [] RHF, [] Pulm HTN, [] Other    [] TOM, [] ATN, [] AIN, [] other  [] CKD1, [] CKD2, [] CKD 3, [] CKD 4, [] CKD 5, []ESRD    Encephalopathy: [] Metabolic, [] Hepatic, [] toxic, [] Neurological, [] Other    Abnormal Nurtitional Status: [] malnurtition (see nutrition note), [ ]underweight: BMI < 19, [] morbid obesity: BMI >40, [] Cachexia    [] Sepsis  [] hypovolemic shock,[] cardiogenic shock, [] hemorrhagic shock, [] neuogenic shock  [] Acute Respiratory Failure  []Cerebral edema, [] Brain compression/ herniation,   [] Functional quadriplegia  [] Acute blood loss anemia

## 2021-08-21 NOTE — PROGRESS NOTE ADULT - SUBJECTIVE AND OBJECTIVE BOX
HEALTH ISSUES - PROBLEM Dx:  Cerebral aneurysm    HTN (hypertension)    GERD (gastroesophageal reflux disease)    Overactive bladder    Osteoporosis    Posterior communicating artery aneurysm  bilateral PCOMM    Encounter for monitoring aspirin therapy    Normocytic anemia    Stage 3 chronic kidney disease    Acute UTI            CHEMOTHERAPY REGIMEN:        Day:                          Diet:  Protocol:                                    IVF:      MEDICATIONS  (STANDING):  amLODIPine   Tablet 10 milliGRAM(s) Oral daily  aspirin 325 milliGRAM(s) Oral daily  ATENolol  Tablet 50 milliGRAM(s) Oral daily  calcium carbonate 1250 mG  + Vitamin D (OsCal 500 + D) 1 Tablet(s) Oral three times a day  chlorhexidine 4% Liquid 1 Application(s) Topical Once  insulin lispro (ADMELOG) corrective regimen sliding scale   SubCutaneous Before meals and at bedtime  pantoprazole    Tablet 40 milliGRAM(s) Oral before breakfast  senna 2 Tablet(s) Oral at bedtime    MEDICATIONS  (PRN):  acetaminophen   Tablet .. 650 milliGRAM(s) Oral every 6 hours PRN Temp greater or equal to 38.5C (101.3F), Moderate Pain (4 - 6)  acetaminophen 300 mG/codeine 30 mG 1 Tablet(s) Oral every 6 hours PRN Severe Pain (7 - 10)  aluminum hydroxide/magnesium hydroxide/simethicone Suspension 30 milliLiter(s) Oral every 4 hours PRN Dyspepsia  diazepam    Tablet 5 milliGRAM(s) Oral every 8 hours PRN muscle spasm  simethicone 80 milliGRAM(s) Chew every 12 hours PRN Gas      Allergies    No Known Allergies    Intolerances        DVT Prophylaxis: [ ] YES [ ] NO      Antibiotics: [ ] YES [ ] NO    Pain Scale (1-10):       Location:    Vital Signs Last 24 Hrs  T(C): 36.7 (21 Aug 2021 13:26), Max: 36.9 (20 Aug 2021 17:54)  T(F): 98 (21 Aug 2021 13:26), Max: 98.4 (20 Aug 2021 17:54)  HR: 72 (21 Aug 2021 16:38) (58 - 76)  BP: 120/59 (21 Aug 2021 16:38) (106/58 - 124/58)  BP(mean): 83 (21 Aug 2021 16:38) (72 - 83)  RR: 18 (21 Aug 2021 16:38) (16 - 18)  SpO2: 98% (21 Aug 2021 16:38) (94% - 98%)    Drug Dosing Weight  Height (cm): 167.6 (20 Aug 2021 08:54)  Weight (kg): 70.307 (20 Aug 2021 08:54)  BMI (kg/m2): 25 (20 Aug 2021 08:54)  BSA (m2): 1.79 (20 Aug 2021 08:54)     Physical Exam:  · Constitutional	detailed exam  · Constitutional Details	well-developed; well-groomed  · Eyes	EOMI; PERRL; no drainage or redness  · ENMT Comments	dry mucous membranes  · Respiratory	detailed exam  · Respiratory Comments	normal breath sounds at the lung bases bilaterally  · Cardiovascular	Regular rate & rhythm, normal S1, S2; no murmurs, gallops or rubs; no S3, S4  · Abd-Soft non tender  ·Ext-no edema, clubbing or cyanosis    URINARY CATHETER: [ ] YES [ ] NO     LABS:  CBC Full  -  ( 21 Aug 2021 06:59 )  WBC Count : 6.97 K/uL  RBC Count : 4.07 M/uL  Hemoglobin : 12.2 g/dL  Hematocrit : 37.4 %  Platelet Count - Automated : 249 K/uL  Mean Cell Volume : 91.9 fl  Mean Cell Hemoglobin : 30.0 pg  Mean Cell Hemoglobin Concentration : 32.6 gm/dL  Auto Neutrophil # : x  Auto Lymphocyte # : x  Auto Monocyte # : x  Auto Eosinophil # : x  Auto Basophil # : x  Auto Neutrophil % : x  Auto Lymphocyte % : x  Auto Monocyte % : x  Auto Eosinophil % : x  Auto Basophil % : x    08-21    141  |  102  |  14  ----------------------------<  101<H>  4.2   |  29  |  1.00    Ca    9.4      21 Aug 2021 06:59  Phos  3.7     08-21  Mg     2.0     08-21            CULTURES:    RADIOLOGY & ADDITIONAL STUDIES:

## 2021-08-22 LAB
ANION GAP SERPL CALC-SCNC: 10 MMOL/L — SIGNIFICANT CHANGE UP (ref 5–17)
BUN SERPL-MCNC: 15 MG/DL — SIGNIFICANT CHANGE UP (ref 7–23)
CALCIUM SERPL-MCNC: 9.7 MG/DL — SIGNIFICANT CHANGE UP (ref 8.4–10.5)
CHLORIDE SERPL-SCNC: 101 MMOL/L — SIGNIFICANT CHANGE UP (ref 96–108)
CO2 SERPL-SCNC: 28 MMOL/L — SIGNIFICANT CHANGE UP (ref 22–31)
CREAT SERPL-MCNC: 0.9 MG/DL — SIGNIFICANT CHANGE UP (ref 0.5–1.3)
GLUCOSE BLDC GLUCOMTR-MCNC: 107 MG/DL — HIGH (ref 70–99)
GLUCOSE BLDC GLUCOMTR-MCNC: 119 MG/DL — HIGH (ref 70–99)
GLUCOSE BLDC GLUCOMTR-MCNC: 99 MG/DL — SIGNIFICANT CHANGE UP (ref 70–99)
GLUCOSE SERPL-MCNC: 109 MG/DL — HIGH (ref 70–99)
HCT VFR BLD CALC: 39 % — SIGNIFICANT CHANGE UP (ref 34.5–45)
HGB BLD-MCNC: 13 G/DL — SIGNIFICANT CHANGE UP (ref 11.5–15.5)
MAGNESIUM SERPL-MCNC: 2.3 MG/DL — SIGNIFICANT CHANGE UP (ref 1.6–2.6)
MCHC RBC-ENTMCNC: 30 PG — SIGNIFICANT CHANGE UP (ref 27–34)
MCHC RBC-ENTMCNC: 33.3 GM/DL — SIGNIFICANT CHANGE UP (ref 32–36)
MCV RBC AUTO: 89.9 FL — SIGNIFICANT CHANGE UP (ref 80–100)
NRBC # BLD: 0 /100 WBCS — SIGNIFICANT CHANGE UP (ref 0–0)
PA ADP PRP-ACNC: 2 PRU — LOW (ref 194–418)
PHOSPHATE SERPL-MCNC: 3.8 MG/DL — SIGNIFICANT CHANGE UP (ref 2.5–4.5)
PLATELET # BLD AUTO: 269 K/UL — SIGNIFICANT CHANGE UP (ref 150–400)
PLATELET RESPONSE ASPIRIN RESULT: 559 ARU — SIGNIFICANT CHANGE UP (ref 350–700)
POTASSIUM SERPL-MCNC: 4 MMOL/L — SIGNIFICANT CHANGE UP (ref 3.5–5.3)
POTASSIUM SERPL-SCNC: 4 MMOL/L — SIGNIFICANT CHANGE UP (ref 3.5–5.3)
RBC # BLD: 4.34 M/UL — SIGNIFICANT CHANGE UP (ref 3.8–5.2)
RBC # FLD: 13.7 % — SIGNIFICANT CHANGE UP (ref 10.3–14.5)
SODIUM SERPL-SCNC: 139 MMOL/L — SIGNIFICANT CHANGE UP (ref 135–145)
WBC # BLD: 8.59 K/UL — SIGNIFICANT CHANGE UP (ref 3.8–10.5)
WBC # FLD AUTO: 8.59 K/UL — SIGNIFICANT CHANGE UP (ref 3.8–10.5)

## 2021-08-22 PROCEDURE — 99232 SBSQ HOSP IP/OBS MODERATE 35: CPT | Mod: GC

## 2021-08-22 PROCEDURE — 99232 SBSQ HOSP IP/OBS MODERATE 35: CPT

## 2021-08-22 RX ADMIN — ATENOLOL 50 MILLIGRAM(S): 25 TABLET ORAL at 05:55

## 2021-08-22 RX ADMIN — AMLODIPINE BESYLATE 10 MILLIGRAM(S): 2.5 TABLET ORAL at 05:55

## 2021-08-22 RX ADMIN — Medication 1 TABLET(S): at 21:49

## 2021-08-22 RX ADMIN — Medication 650 MILLIGRAM(S): at 21:50

## 2021-08-22 RX ADMIN — Medication 1 TABLET(S): at 05:55

## 2021-08-22 RX ADMIN — Medication 1 TABLET(S): at 13:39

## 2021-08-22 RX ADMIN — Medication 325 MILLIGRAM(S): at 11:56

## 2021-08-22 RX ADMIN — Medication 650 MILLIGRAM(S): at 21:48

## 2021-08-22 NOTE — PROGRESS NOTE ADULT - SUBJECTIVE AND OBJECTIVE BOX
Patient is a 71y old  Female who presents with a chief complaint of stent placement (19 Aug 2021 11:17)      HPI:  71 year old female w/ Osteoarthritis, HTN, Anxiety, GERD presents for elective cerebral angiography. Patient reports chronic neck and head pain, but otherwise denies visual or hearing changes, extremity weakness or numbness, or gait changes. 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 She presents today for Pipeline stent placement for likely right PCOMM aneurysm. She has been on Aspirin and Plavix in anticipation of this procedure. (18 Aug 2021 15:07)    Subjective:  Pt reports suprapubic pain as well as some back pain. Denies SOB, CP. ROS is otherwise negative.     Allergies    No Known Allergies    Intolerances      MEDICATIONS  (STANDING):  amLODIPine   Tablet 10 milliGRAM(s) Oral daily  aspirin 325 milliGRAM(s) Oral daily  ATENolol  Tablet 50 milliGRAM(s) Oral daily  calcium carbonate 1250 mG  + Vitamin D (OsCal 500 + D) 1 Tablet(s) Oral three times a day  chlorhexidine 4% Liquid 1 Application(s) Topical Once  insulin lispro (ADMELOG) corrective regimen sliding scale   SubCutaneous Before meals and at bedtime  pantoprazole    Tablet 40 milliGRAM(s) Oral before breakfast  senna 2 Tablet(s) Oral at bedtime    MEDICATIONS  (PRN):  acetaminophen   Tablet .. 650 milliGRAM(s) Oral every 6 hours PRN Temp greater or equal to 38.5C (101.3F), Moderate Pain (4 - 6)  acetaminophen 300 mG/codeine 30 mG 1 Tablet(s) Oral every 6 hours PRN Severe Pain (7 - 10)  aluminum hydroxide/magnesium hydroxide/simethicone Suspension 30 milliLiter(s) Oral every 4 hours PRN Dyspepsia  diazepam    Tablet 5 milliGRAM(s) Oral every 8 hours PRN muscle spasm  simethicone 80 milliGRAM(s) Chew every 12 hours PRN Gas          Drug Dosing Weight      PAST MEDICAL & SURGICAL HISTORY:  HTN (hypertension)    GERD (gastroesophageal reflux disease)    Anxiety    Osteoporosis    Overactive bladder    History of cholecystectomy    H/O shoulder surgery        FAMILY HISTORY:  No pertinent family history in first degree relatives        SOCIAL HISTORY:    ADVANCE DIRECTIVES:      Vital Signs Last 24 Hrs  T(C): 36.9 (22 Aug 2021 05:28), Max: 36.9 (22 Aug 2021 05:28)  T(F): 98.5 (22 Aug 2021 05:28), Max: 98.5 (22 Aug 2021 05:28)  HR: 78 (22 Aug 2021 03:30) (66 - 78)  BP: 121/54 (22 Aug 2021 03:30) (112/58 - 140/60)  BP(mean): 78 (22 Aug 2021 03:30) (77 - 86)  RR: 17 (22 Aug 2021 03:30) (16 - 18)  SpO2: 94% (22 Aug 2021 03:30) (93% - 98%)        PHYSICAL EXAM:      Constitutional: NAD  Eyes: PERRLA  ENMT: MMM  Neck: supple  Back: midline  Respiratory: CTA b/l  Cardiovascular: rrr, s1s2, no m/r/g  Gastrointestinal: soft, NTND, + BS  Genitourinary: + suprapubic TTP  Extremities: wwp  Vascular: + 2 pulses radial  Neurological: AAO x 4  Skin: no rash  Lymph Nodes: no LAD  Musculoskeletal: no joint swelling  Psychiatric: normal affect        LABS:                          12.2   6.97  )-----------( 249      ( 21 Aug 2021 06:59 )             37.4   08-21    141  |  102  |  14  ----------------------------<  101<H>  4.2   |  29  |  1.00    Ca    9.4      21 Aug 2021 06:59  Phos  3.7     08-21  Mg     2.0     08-21            EKG:    ECHO, US:    RADIOLOGY:    CRITICAL CARE TIME SPENT:

## 2021-08-22 NOTE — PROGRESS NOTE ADULT - SUBJECTIVE AND OBJECTIVE BOX
HPI:  71 year old female w/ Osteoarthritis, HTN, Anxiety, GERD presents for elective cerebral angiography. Patient reports chronic neck and head pain, but otherwise denies visual or hearing changes, extremity weakness or numbness, or gait changes. 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 She presents today for Pipeline stent placement for likely right PCOMM aneurysm. She has been on Aspirin and Plavix in anticipation of this procedure. (18 Aug 2021 15:07)      Hospital course:   8/18: POD# 0 S/P femoral cerebral angiogram for R ICA pipeline stent for PComm aneurysm.  Post op with oozing from R groin site, compressed x 30 mins with improvement.   8/19: POD 1 s/p pipeline stent. Pending accumetrics after AM ASA/Plavix.   8/20: POD 2, TONI overnightm neuro stable. Pending repeat accumentrics this afternoon.  8/21: POD 3. TONI overnight. p2y12 resulted supratherapeutic, pending repeat accumetrics in the afternoon   8/211: POD 4. TONI overnight. Repeat P2y12 and ASA assay in the morning. Hold plavix dose until monday per heme recommendations    Vital Signs Last 24 Hrs  T(C): 36.7 (21 Aug 2021 13:26), Max: 36.7 (21 Aug 2021 13:26)  T(F): 98 (21 Aug 2021 13:26), Max: 98 (21 Aug 2021 13:26)  HR: 76 (22 Aug 2021 00:08) (58 - 76)  BP: 117/56 (22 Aug 2021 00:08) (112/58 - 140/60)  BP(mean): 81 (22 Aug 2021 00:08) (77 - 86)  RR: 18 (22 Aug 2021 00:08) (16 - 18)  SpO2: 97% (22 Aug 2021 00:08) (93% - 98%)    I&O's Detail    20 Aug 2021 07:01  -  21 Aug 2021 07:00  --------------------------------------------------------  IN:    Oral Fluid: 240 mL  Total IN: 240 mL    OUT:    Voided (mL): 3800 mL  Total OUT: 3800 mL    Total NET: -3560 mL      21 Aug 2021 07:01  -  22 Aug 2021 01:26  --------------------------------------------------------  IN:  Total IN: 0 mL    OUT:    Voided (mL): 2550 mL  Total OUT: 2550 mL    Total NET: -2550 mL        I&O's Summary    20 Aug 2021 07:01  -  21 Aug 2021 07:00  --------------------------------------------------------  IN: 240 mL / OUT: 3800 mL / NET: -3560 mL    21 Aug 2021 07:01  -  22 Aug 2021 01:26  --------------------------------------------------------  IN: 0 mL / OUT: 2550 mL / NET: -2550 mL      PHYSICAL EXAM:  GEN: laying in bed, appears well, NAD  NEURO: AOx3. FC, OE spont, speech intact, face symmetric. CNII-XII intact. PERRL, EOMI. No pronator drift. MAEx4. 5/5 strength throughout. SILT  CV: RRR +S1/S2  PULM: CTAB  GI: Abd soft, NT/ND  EXT: ext warm, dry, nontender  WOUND/DRAINS: R groin c/d/i. DP/PT pulses 2+ b/l    TUBES/LINES:  [] CVC  [] A-line  [] Lumbar Drain  [] Ventriculostomy  [] Other    DIET:  [] NPO  [] Mechanical  [] Tube feeds    LABS:                        12.2   6.97  )-----------( 249      ( 21 Aug 2021 06:59 )             37.4     08-21    141  |  102  |  14  ----------------------------<  101<H>  4.2   |  29  |  1.00    Ca    9.4      21 Aug 2021 06:59  Phos  3.7     08-21  Mg     2.0     08-21              CAPILLARY BLOOD GLUCOSE      POCT Blood Glucose.: 108 mg/dL (21 Aug 2021 22:25)  POCT Blood Glucose.: 108 mg/dL (21 Aug 2021 16:58)  POCT Blood Glucose.: 95 mg/dL (21 Aug 2021 11:27)  POCT Blood Glucose.: 100 mg/dL (21 Aug 2021 06:13)      Drug Levels: [] N/A    CSF Analysis: [] N/A      Allergies    No Known Allergies    Intolerances      MEDICATIONS:  Antibiotics:    Neuro:  acetaminophen   Tablet .. 650 milliGRAM(s) Oral every 6 hours PRN  acetaminophen 300 mG/codeine 30 mG 1 Tablet(s) Oral every 6 hours PRN  aspirin 325 milliGRAM(s) Oral daily  diazepam    Tablet 5 milliGRAM(s) Oral every 8 hours PRN    Anticoagulation:    OTHER:  aluminum hydroxide/magnesium hydroxide/simethicone Suspension 30 milliLiter(s) Oral every 4 hours PRN  amLODIPine   Tablet 10 milliGRAM(s) Oral daily  ATENolol  Tablet 50 milliGRAM(s) Oral daily  chlorhexidine 4% Liquid 1 Application(s) Topical Once  insulin lispro (ADMELOG) corrective regimen sliding scale   SubCutaneous Before meals and at bedtime  pantoprazole    Tablet 40 milliGRAM(s) Oral before breakfast  senna 2 Tablet(s) Oral at bedtime  simethicone 80 milliGRAM(s) Chew every 12 hours PRN    IVF:  calcium carbonate 1250 mG  + Vitamin D (OsCal 500 + D) 1 Tablet(s) Oral three times a day    CULTURES:  Culture Results:   >100,000 CFU/ml Klebsiella oxytoca (08-18 @ 17:09)    RADIOLOGY & ADDITIONAL TESTS:      ASSESSMENT:  71 year old female w/ HTN, Osteoarthritis, Anxiety, GERD, overactive bladder with headaches, found to have b/l PCOMM aneurysms on cerebral angiogram 04/2021 now s/p elective Flow diverter Pipeline stent (8/18/21).      [id number]    No pertinent family history in first degree relatives    Handoff    MEWS Score    No pertinent past medical history    No pertinent past medical history    HTN (hypertension)    GERD (gastroesophageal reflux disease)    Anxiety    Osteoporosis    Overactive bladder    Posterior communicating artery aneurysm    Posterior communicating artery aneurysm    Angiogram, cerebral, with embolization    Cerebral aneurysm    Cerebral aneurysm    HTN (hypertension)    GERD (gastroesophageal reflux disease)    Overactive bladder    Osteoporosis    Posterior communicating artery aneurysm    Encounter for monitoring aspirin therapy    Normocytic anemia    Stage 3 chronic kidney disease    Acute UTI    Encounter for patient compliance monitoring in drug treatment program    Angiogram, cerebral, with embolization    History of cholecystectomy    H/O shoulder surgery    HTN (hypertension)    GERD (gastroesophageal reflux disease)    Overactive bladder    Osteoporosis    Normocytic anemia    Encounter for monitoring aspirin therapy    Stage 3 chronic kidney disease    SysAdmin_VstLnk        PLAN:  NEURO:  - neuro/vitals/groin/pulse checks  - pain control PRN, valium for back spasm  - cont , hold Plavix until monday AM 8/23. Repeat accumetrics daily     CARDIOVASCULAR:  - normotensive SBP goal  - continue home meds: atenolol, amlodipine    PULMONARY:  - stable on RA    RENAL:  - IVL  - voiding     GI:  - ADAT  - bowel regimen  - PPI on steroids    HEME:  - SCDs for DVT ppx    ID:  - f/u urine culture  - started on Bactrim x 3 days for + UA - complete 8/21    ENDO:  - ISS    DISPO:  - full code, SDU  - dispo pending home     Discussed with Dr. Jc      Assessment:  Present when checked    []  GCS  E   V  M     Heart Failure: []Acute, [] acute on chronic , []chronic  Heart Failure:  [] Diastolic (HFpEF), [] Systolic (HFrEF), []Combined (HFpEF and HFrEF), [] RHF, [] Pulm HTN, [] Other    [] TOM, [] ATN, [] AIN, [] other  [] CKD1, [] CKD2, [] CKD 3, [] CKD 4, [] CKD 5, []ESRD    Encephalopathy: [] Metabolic, [] Hepatic, [] toxic, [] Neurological, [] Other    Abnormal Nurtitional Status: [] malnurtition (see nutrition note), [ ]underweight: BMI < 19, [] morbid obesity: BMI >40, [] Cachexia    [] Sepsis  [] hypovolemic shock,[] cardiogenic shock, [] hemorrhagic shock, [] neuogenic shock  [] Acute Respiratory Failure  []Cerebral edema, [] Brain compression/ herniation,   [] Functional quadriplegia  [] Acute blood loss anemia   HPI:  71 year old female w/ Osteoarthritis, HTN, Anxiety, GERD presents for elective cerebral angiography. Patient reports chronic neck and head pain, but otherwise denies visual or hearing changes, extremity weakness or numbness, or gait changes. 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 She presents today for Pipeline stent placement for likely right PCOMM aneurysm. She has been on Aspirin and Plavix in anticipation of this procedure. (18 Aug 2021 15:07)      Hospital course:   8/18: POD# 0 S/P femoral cerebral angiogram for R ICA pipeline stent for PComm aneurysm.  Post op with oozing from R groin site, compressed x 30 mins with improvement.   8/19: POD 1 s/p pipeline stent. Pending accumetrics after AM ASA/Plavix.   8/20: POD 2, TONI overnightm neuro stable. Pending repeat accumentrics this afternoon.  8/21: POD 3. TONI overnight. p2y12 resulted supratherapeutic, pending repeat accumetrics in the afternoon   8/22: POD 4. TONI overnight. Repeat P2y12 and ASA assay in the morning. Hold plavix dose until monday per heme recommendations    Vital Signs Last 24 Hrs  T(C): 36.7 (21 Aug 2021 13:26), Max: 36.7 (21 Aug 2021 13:26)  T(F): 98 (21 Aug 2021 13:26), Max: 98 (21 Aug 2021 13:26)  HR: 76 (22 Aug 2021 00:08) (58 - 76)  BP: 117/56 (22 Aug 2021 00:08) (112/58 - 140/60)  BP(mean): 81 (22 Aug 2021 00:08) (77 - 86)  RR: 18 (22 Aug 2021 00:08) (16 - 18)  SpO2: 97% (22 Aug 2021 00:08) (93% - 98%)    I&O's Detail    20 Aug 2021 07:01  -  21 Aug 2021 07:00  --------------------------------------------------------  IN:    Oral Fluid: 240 mL  Total IN: 240 mL    OUT:    Voided (mL): 3800 mL  Total OUT: 3800 mL    Total NET: -3560 mL      21 Aug 2021 07:01  -  22 Aug 2021 01:26  --------------------------------------------------------  IN:  Total IN: 0 mL    OUT:    Voided (mL): 2550 mL  Total OUT: 2550 mL    Total NET: -2550 mL        I&O's Summary    20 Aug 2021 07:01  -  21 Aug 2021 07:00  --------------------------------------------------------  IN: 240 mL / OUT: 3800 mL / NET: -3560 mL    21 Aug 2021 07:01  -  22 Aug 2021 01:26  --------------------------------------------------------  IN: 0 mL / OUT: 2550 mL / NET: -2550 mL      PHYSICAL EXAM:  GEN: laying in bed, appears well, NAD  NEURO: AOx3. FC, OE spont, speech intact, face symmetric. CNII-XII intact. PERRL, EOMI. No pronator drift. MAEx4. 5/5 strength throughout. SILT  CV: RRR +S1/S2  PULM: CTAB  GI: Abd soft, NT/ND  EXT: ext warm, dry, nontender  WOUND/DRAINS: R groin c/d/i. DP/PT pulses 2+ b/l    TUBES/LINES:  [] CVC  [] A-line  [] Lumbar Drain  [] Ventriculostomy  [] Other    DIET:  [] NPO  [] Mechanical  [] Tube feeds    LABS:                        12.2   6.97  )-----------( 249      ( 21 Aug 2021 06:59 )             37.4     08-21    141  |  102  |  14  ----------------------------<  101<H>  4.2   |  29  |  1.00    Ca    9.4      21 Aug 2021 06:59  Phos  3.7     08-21  Mg     2.0     08-21              CAPILLARY BLOOD GLUCOSE      POCT Blood Glucose.: 108 mg/dL (21 Aug 2021 22:25)  POCT Blood Glucose.: 108 mg/dL (21 Aug 2021 16:58)  POCT Blood Glucose.: 95 mg/dL (21 Aug 2021 11:27)  POCT Blood Glucose.: 100 mg/dL (21 Aug 2021 06:13)      Drug Levels: [] N/A    CSF Analysis: [] N/A      Allergies    No Known Allergies    Intolerances      MEDICATIONS:  Antibiotics:    Neuro:  acetaminophen   Tablet .. 650 milliGRAM(s) Oral every 6 hours PRN  acetaminophen 300 mG/codeine 30 mG 1 Tablet(s) Oral every 6 hours PRN  aspirin 325 milliGRAM(s) Oral daily  diazepam    Tablet 5 milliGRAM(s) Oral every 8 hours PRN    Anticoagulation:    OTHER:  aluminum hydroxide/magnesium hydroxide/simethicone Suspension 30 milliLiter(s) Oral every 4 hours PRN  amLODIPine   Tablet 10 milliGRAM(s) Oral daily  ATENolol  Tablet 50 milliGRAM(s) Oral daily  chlorhexidine 4% Liquid 1 Application(s) Topical Once  insulin lispro (ADMELOG) corrective regimen sliding scale   SubCutaneous Before meals and at bedtime  pantoprazole    Tablet 40 milliGRAM(s) Oral before breakfast  senna 2 Tablet(s) Oral at bedtime  simethicone 80 milliGRAM(s) Chew every 12 hours PRN    IVF:  calcium carbonate 1250 mG  + Vitamin D (OsCal 500 + D) 1 Tablet(s) Oral three times a day    CULTURES:  Culture Results:   >100,000 CFU/ml Klebsiella oxytoca (08-18 @ 17:09)    RADIOLOGY & ADDITIONAL TESTS:      ASSESSMENT:  71 year old female w/ HTN, Osteoarthritis, Anxiety, GERD, overactive bladder with headaches, found to have b/l PCOMM aneurysms on cerebral angiogram 04/2021 now s/p elective Flow diverter Pipeline stent (8/18/21).      [id number]    No pertinent family history in first degree relatives    Handoff    MEWS Score    No pertinent past medical history    No pertinent past medical history    HTN (hypertension)    GERD (gastroesophageal reflux disease)    Anxiety    Osteoporosis    Overactive bladder    Posterior communicating artery aneurysm    Posterior communicating artery aneurysm    Angiogram, cerebral, with embolization    Cerebral aneurysm    Cerebral aneurysm    HTN (hypertension)    GERD (gastroesophageal reflux disease)    Overactive bladder    Osteoporosis    Posterior communicating artery aneurysm    Encounter for monitoring aspirin therapy    Normocytic anemia    Stage 3 chronic kidney disease    Acute UTI    Encounter for patient compliance monitoring in drug treatment program    Angiogram, cerebral, with embolization    History of cholecystectomy    H/O shoulder surgery    HTN (hypertension)    GERD (gastroesophageal reflux disease)    Overactive bladder    Osteoporosis    Normocytic anemia    Encounter for monitoring aspirin therapy    Stage 3 chronic kidney disease    SysAdmin_VstLnk        PLAN:  NEURO:  - neuro/vitals/groin/pulse checks  - pain control PRN, valium for back spasm  - cont , hold Plavix until monday AM 8/23. Repeat accumetrics daily     CARDIOVASCULAR:  - normotensive SBP goal  - continue home meds: atenolol, amlodipine    PULMONARY:  - stable on RA    RENAL:  - IVL  - voiding     GI:  - ADAT  - bowel regimen  - PPI on steroids    HEME:  - SCDs for DVT ppx    ID:  - f/u urine culture  - started on Bactrim x 3 days for + UA - complete 8/21    ENDO:  - ISS    DISPO:  - full code, SDU  - dispo pending home     Discussed with Dr. Jc      Assessment:  Present when checked    []  GCS  E   V  M     Heart Failure: []Acute, [] acute on chronic , []chronic  Heart Failure:  [] Diastolic (HFpEF), [] Systolic (HFrEF), []Combined (HFpEF and HFrEF), [] RHF, [] Pulm HTN, [] Other    [] TOM, [] ATN, [] AIN, [] other  [] CKD1, [] CKD2, [] CKD 3, [] CKD 4, [] CKD 5, []ESRD    Encephalopathy: [] Metabolic, [] Hepatic, [] toxic, [] Neurological, [] Other    Abnormal Nurtitional Status: [] malnurtition (see nutrition note), [ ]underweight: BMI < 19, [] morbid obesity: BMI >40, [] Cachexia    [] Sepsis  [] hypovolemic shock,[] cardiogenic shock, [] hemorrhagic shock, [] neuogenic shock  [] Acute Respiratory Failure  []Cerebral edema, [] Brain compression/ herniation,   [] Functional quadriplegia  [] Acute blood loss anemia   HPI:  71 year old female w/ Osteoarthritis, HTN, Anxiety, GERD presents for elective cerebral angiography. Patient reports chronic neck and head pain, but otherwise denies visual or hearing changes, extremity weakness or numbness, or gait changes. 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 She presents today for Pipeline stent placement for likely right PCOMM aneurysm. She has been on Aspirin and Plavix in anticipation of this procedure. (18 Aug 2021 15:07)      Hospital course:   8/18: POD# 0 S/P femoral cerebral angiogram for R ICA pipeline stent for PComm aneurysm.  Post op with oozing from R groin site, compressed x 30 mins with improvement.   8/19: POD 1 s/p pipeline stent. Pending accumetrics after AM ASA/Plavix.   8/20: POD 2, TONI overnightm neuro stable. Pending repeat accumentrics this afternoon.  8/21: POD 3. TONI overnight. p2y12 resulted supratherapeutic, pending repeat accumetrics in the afternoon   8/22: POD 4. TONI overnight. Repeat P2y12 and ASA assay in the morning. Hold plavix dose until monday per heme recommendations    Vital Signs Last 24 Hrs  T(C): 36.7 (21 Aug 2021 13:26), Max: 36.7 (21 Aug 2021 13:26)  T(F): 98 (21 Aug 2021 13:26), Max: 98 (21 Aug 2021 13:26)  HR: 76 (22 Aug 2021 00:08) (58 - 76)  BP: 117/56 (22 Aug 2021 00:08) (112/58 - 140/60)  BP(mean): 81 (22 Aug 2021 00:08) (77 - 86)  RR: 18 (22 Aug 2021 00:08) (16 - 18)  SpO2: 97% (22 Aug 2021 00:08) (93% - 98%)    I&O's Detail    20 Aug 2021 07:01  -  21 Aug 2021 07:00  --------------------------------------------------------  IN:    Oral Fluid: 240 mL  Total IN: 240 mL    OUT:    Voided (mL): 3800 mL  Total OUT: 3800 mL    Total NET: -3560 mL      21 Aug 2021 07:01  -  22 Aug 2021 01:26  --------------------------------------------------------  IN:  Total IN: 0 mL    OUT:    Voided (mL): 2550 mL  Total OUT: 2550 mL    Total NET: -2550 mL        I&O's Summary    20 Aug 2021 07:01  -  21 Aug 2021 07:00  --------------------------------------------------------  IN: 240 mL / OUT: 3800 mL / NET: -3560 mL    21 Aug 2021 07:01  -  22 Aug 2021 01:26  --------------------------------------------------------  IN: 0 mL / OUT: 2550 mL / NET: -2550 mL      PHYSICAL EXAM:  GEN: laying in bed, appears well, NAD  NEURO: AOx3. FC, OE spont, speech intact, face symmetric. CNII-XII intact. PERRL, EOMI. No pronator drift. MAEx4. 5/5 strength throughout. SILT  CV: RRR +S1/S2  PULM: CTAB  GI: Abd soft, NT/ND  EXT: ext warm, dry, nontender  WOUND/DRAINS: R groin c/d/i. DP/PT pulses 2+ b/l    TUBES/LINES:  [] CVC  [] A-line  [] Lumbar Drain  [] Ventriculostomy  [] Other    DIET:  [] NPO  [] Mechanical  [] Tube feeds    LABS:                        12.2   6.97  )-----------( 249      ( 21 Aug 2021 06:59 )             37.4     08-21    141  |  102  |  14  ----------------------------<  101<H>  4.2   |  29  |  1.00    Ca    9.4      21 Aug 2021 06:59  Phos  3.7     08-21  Mg     2.0     08-21              CAPILLARY BLOOD GLUCOSE      POCT Blood Glucose.: 108 mg/dL (21 Aug 2021 22:25)  POCT Blood Glucose.: 108 mg/dL (21 Aug 2021 16:58)  POCT Blood Glucose.: 95 mg/dL (21 Aug 2021 11:27)  POCT Blood Glucose.: 100 mg/dL (21 Aug 2021 06:13)      Drug Levels: [] N/A    CSF Analysis: [] N/A      Allergies    No Known Allergies    Intolerances      MEDICATIONS:  Antibiotics:    Neuro:  acetaminophen   Tablet .. 650 milliGRAM(s) Oral every 6 hours PRN  acetaminophen 300 mG/codeine 30 mG 1 Tablet(s) Oral every 6 hours PRN  aspirin 325 milliGRAM(s) Oral daily  diazepam    Tablet 5 milliGRAM(s) Oral every 8 hours PRN    Anticoagulation:    OTHER:  aluminum hydroxide/magnesium hydroxide/simethicone Suspension 30 milliLiter(s) Oral every 4 hours PRN  amLODIPine   Tablet 10 milliGRAM(s) Oral daily  ATENolol  Tablet 50 milliGRAM(s) Oral daily  chlorhexidine 4% Liquid 1 Application(s) Topical Once  insulin lispro (ADMELOG) corrective regimen sliding scale   SubCutaneous Before meals and at bedtime  pantoprazole    Tablet 40 milliGRAM(s) Oral before breakfast  senna 2 Tablet(s) Oral at bedtime  simethicone 80 milliGRAM(s) Chew every 12 hours PRN    IVF:  calcium carbonate 1250 mG  + Vitamin D (OsCal 500 + D) 1 Tablet(s) Oral three times a day    CULTURES:  Culture Results:   >100,000 CFU/ml Klebsiella oxytoca (08-18 @ 17:09)    RADIOLOGY & ADDITIONAL TESTS:      ASSESSMENT:  71 year old female w/ HTN, Osteoarthritis, Anxiety, GERD, overactive bladder with headaches, found to have b/l PCOMM aneurysms on cerebral angiogram 04/2021 now s/p elective Flow diverter Pipeline stent (8/18/21).      [id number]    No pertinent family history in first degree relatives    Handoff    MEWS Score    No pertinent past medical history    No pertinent past medical history    HTN (hypertension)    GERD (gastroesophageal reflux disease)    Anxiety    Osteoporosis    Overactive bladder    Posterior communicating artery aneurysm    Posterior communicating artery aneurysm    Angiogram, cerebral, with embolization    Cerebral aneurysm    Cerebral aneurysm    HTN (hypertension)    GERD (gastroesophageal reflux disease)    Overactive bladder    Osteoporosis    Posterior communicating artery aneurysm    Encounter for monitoring aspirin therapy    Normocytic anemia    Stage 3 chronic kidney disease    Acute UTI    Encounter for patient compliance monitoring in drug treatment program    Angiogram, cerebral, with embolization    History of cholecystectomy    H/O shoulder surgery    HTN (hypertension)    GERD (gastroesophageal reflux disease)    Overactive bladder    Osteoporosis    Normocytic anemia    Encounter for monitoring aspirin therapy    Stage 3 chronic kidney disease    SysAdmin_VstLnk        PLAN:  NEURO:  - neuro/vitals/groin/pulse checks  - pain control PRN, valium for back spasm  - cont , hold Plavix until monday AM 8/23. Repeat accumetrics daily     CARDIOVASCULAR:  - normotensive SBP goal  - continue home meds: atenolol, amlodipine    PULMONARY:  - stable on RA    RENAL:  - IVL  - voiding     GI:  - ADAT  - bowel regimen  - PPI on steroids    HEME:  - SCDs for DVT ppx    ID:  - f/u urine culture  - started on Bactrim x 3 days for + UA - complete 8/21    ENDO:  - ISS    DISPO:  - full code, SDU  - dispo pending home     Discussed with Dr. Jc      Assessment:  Present when checked    []  GCS  E   V  M     Heart Failure: []Acute, [] acute on chronic , []chronic  Heart Failure:  [] Diastolic (HFpEF), [] Systolic (HFrEF), []Combined (HFpEF and HFrEF), [] RHF, [] Pulm HTN, [] Other    [] TOM, [] ATN, [] AIN, [] other  [] CKD1, [] CKD2, [] CKD 3, [] CKD 4, [] CKD 5, []ESRD    Encephalopathy: [] Metabolic, [] Hepatic, [] toxic, [] Neurological, [] Other    Abnormal Nurtitional Status: [] malnurtition (see nutrition note), [ ]underweight: BMI < 19, [] morbid obesity: BMI >40, [] Cachexia    [] Sepsis  [] hypovolemic shock,[] cardiogenic shock, [] hemorrhagic shock, [] neuogenic shock  [] Acute Respiratory Failure  []Cerebral edema, [] Brain compression/ herniation,   [] Functional quadriplegia  [] Acute blood loss anemia

## 2021-08-23 VITALS — TEMPERATURE: 98 F

## 2021-08-23 DIAGNOSIS — G45.3 AMAUROSIS FUGAX: ICD-10-CM

## 2021-08-23 LAB
ANION GAP SERPL CALC-SCNC: 10 MMOL/L — SIGNIFICANT CHANGE UP (ref 5–17)
BUN SERPL-MCNC: 18 MG/DL — SIGNIFICANT CHANGE UP (ref 7–23)
CALCIUM SERPL-MCNC: 9.9 MG/DL — SIGNIFICANT CHANGE UP (ref 8.4–10.5)
CHLORIDE SERPL-SCNC: 103 MMOL/L — SIGNIFICANT CHANGE UP (ref 96–108)
CO2 SERPL-SCNC: 25 MMOL/L — SIGNIFICANT CHANGE UP (ref 22–31)
CREAT SERPL-MCNC: 0.8 MG/DL — SIGNIFICANT CHANGE UP (ref 0.5–1.3)
GLUCOSE SERPL-MCNC: 118 MG/DL — HIGH (ref 70–99)
HCT VFR BLD CALC: 39.5 % — SIGNIFICANT CHANGE UP (ref 34.5–45)
HGB BLD-MCNC: 12.9 G/DL — SIGNIFICANT CHANGE UP (ref 11.5–15.5)
MAGNESIUM SERPL-MCNC: 1.9 MG/DL — SIGNIFICANT CHANGE UP (ref 1.6–2.6)
MCHC RBC-ENTMCNC: 29.6 PG — SIGNIFICANT CHANGE UP (ref 27–34)
MCHC RBC-ENTMCNC: 32.7 GM/DL — SIGNIFICANT CHANGE UP (ref 32–36)
MCV RBC AUTO: 90.6 FL — SIGNIFICANT CHANGE UP (ref 80–100)
NRBC # BLD: 0 /100 WBCS — SIGNIFICANT CHANGE UP (ref 0–0)
PA ADP PRP-ACNC: 5 PRU — LOW (ref 194–418)
PHOSPHATE SERPL-MCNC: 3.9 MG/DL — SIGNIFICANT CHANGE UP (ref 2.5–4.5)
PLATELET # BLD AUTO: 264 K/UL — SIGNIFICANT CHANGE UP (ref 150–400)
PLATELET RESPONSE ASPIRIN RESULT: 544 ARU — SIGNIFICANT CHANGE UP (ref 350–700)
POTASSIUM SERPL-MCNC: 3.9 MMOL/L — SIGNIFICANT CHANGE UP (ref 3.5–5.3)
POTASSIUM SERPL-SCNC: 3.9 MMOL/L — SIGNIFICANT CHANGE UP (ref 3.5–5.3)
RBC # BLD: 4.36 M/UL — SIGNIFICANT CHANGE UP (ref 3.8–5.2)
RBC # FLD: 13.6 % — SIGNIFICANT CHANGE UP (ref 10.3–14.5)
SODIUM SERPL-SCNC: 138 MMOL/L — SIGNIFICANT CHANGE UP (ref 135–145)
WBC # BLD: 7.95 K/UL — SIGNIFICANT CHANGE UP (ref 3.8–10.5)
WBC # FLD AUTO: 7.95 K/UL — SIGNIFICANT CHANGE UP (ref 3.8–10.5)

## 2021-08-23 PROCEDURE — 81001 URINALYSIS AUTO W/SCOPE: CPT

## 2021-08-23 PROCEDURE — 97530 THERAPEUTIC ACTIVITIES: CPT

## 2021-08-23 PROCEDURE — C1887: CPT

## 2021-08-23 PROCEDURE — 85027 COMPLETE CBC AUTOMATED: CPT

## 2021-08-23 PROCEDURE — 80048 BASIC METABOLIC PNL TOTAL CA: CPT

## 2021-08-23 PROCEDURE — 99233 SBSQ HOSP IP/OBS HIGH 50: CPT

## 2021-08-23 PROCEDURE — 99232 SBSQ HOSP IP/OBS MODERATE 35: CPT | Mod: GC

## 2021-08-23 PROCEDURE — C1889: CPT

## 2021-08-23 PROCEDURE — 87186 SC STD MICRODIL/AGAR DIL: CPT

## 2021-08-23 PROCEDURE — 83036 HEMOGLOBIN GLYCOSYLATED A1C: CPT

## 2021-08-23 PROCEDURE — 82962 GLUCOSE BLOOD TEST: CPT

## 2021-08-23 PROCEDURE — 36415 COLL VENOUS BLD VENIPUNCTURE: CPT

## 2021-08-23 PROCEDURE — 86803 HEPATITIS C AB TEST: CPT

## 2021-08-23 PROCEDURE — C1760: CPT

## 2021-08-23 PROCEDURE — 97161 PT EVAL LOW COMPLEX 20 MIN: CPT

## 2021-08-23 PROCEDURE — 84100 ASSAY OF PHOSPHORUS: CPT

## 2021-08-23 PROCEDURE — C1769: CPT

## 2021-08-23 PROCEDURE — 83735 ASSAY OF MAGNESIUM: CPT

## 2021-08-23 PROCEDURE — 85576 BLOOD PLATELET AGGREGATION: CPT

## 2021-08-23 PROCEDURE — 87086 URINE CULTURE/COLONY COUNT: CPT

## 2021-08-23 PROCEDURE — 97116 GAIT TRAINING THERAPY: CPT

## 2021-08-23 RX ORDER — CLOPIDOGREL BISULFATE 75 MG/1
1 TABLET, FILM COATED ORAL
Qty: 0 | Refills: 0 | DISCHARGE

## 2021-08-23 RX ORDER — ASPIRIN/CALCIUM CARB/MAGNESIUM 324 MG
325 TABLET ORAL ONCE
Refills: 0 | Status: COMPLETED | OUTPATIENT
Start: 2021-08-23 | End: 2021-08-23

## 2021-08-23 RX ORDER — PANTOPRAZOLE SODIUM 20 MG/1
1 TABLET, DELAYED RELEASE ORAL
Qty: 0 | Refills: 0 | DISCHARGE
Start: 2021-08-23

## 2021-08-23 RX ORDER — ASPIRIN/CALCIUM CARB/MAGNESIUM 324 MG
1 TABLET ORAL
Qty: 30 | Refills: 0
Start: 2021-08-23 | End: 2021-09-21

## 2021-08-23 RX ORDER — ASPIRIN/CALCIUM CARB/MAGNESIUM 324 MG
650 TABLET ORAL
Qty: 0 | Refills: 0 | DISCHARGE

## 2021-08-23 RX ADMIN — ATENOLOL 50 MILLIGRAM(S): 25 TABLET ORAL at 06:06

## 2021-08-23 RX ADMIN — Medication 650 MILLIGRAM(S): at 01:56

## 2021-08-23 RX ADMIN — Medication 325 MILLIGRAM(S): at 11:41

## 2021-08-23 RX ADMIN — Medication 1 TABLET(S): at 14:06

## 2021-08-23 RX ADMIN — PANTOPRAZOLE SODIUM 40 MILLIGRAM(S): 20 TABLET, DELAYED RELEASE ORAL at 06:05

## 2021-08-23 RX ADMIN — AMLODIPINE BESYLATE 10 MILLIGRAM(S): 2.5 TABLET ORAL at 06:06

## 2021-08-23 RX ADMIN — Medication 1 TABLET(S): at 06:05

## 2021-08-23 NOTE — DIETITIAN INITIAL EVALUATION ADULT. - OTHER INFO
71 year old female w/ HTN, Osteoarthritis, Anxiety, GERD, overactive bladder with headaches, found to have b/l PCOMM aneurysms on cerebral angiogram 04/2021 now s/p elective Flow diverter Pipeline stent (8/18/21).    Pt seen at bedside for initial assessment. Pt able to communicate in English. Denies nausea/vomiting at this time. Last documented bowel movement 8/21. Confirms NKFA. Denies change in appetite PTA; endorses 2 meals/day at home (does not eat red meat; enjoys eggs, yogurt, team, fish, chicken soup). Reports usual body weight 188 pounds. Verbalizes no recent weight loss. Noted with dosing weight 155 pounds (bed weight taken secondary to weight discrepancy: 198 pounds; dosing weight likely inaccurate. No edema documented at this time. No pressure ulcers documented at this time; right groin surgical incision per chart. Myron score=20. Observed pt with no overt signs of muscle or fat wasting. Based on ASPEN guidelines, pt does not meet criteria for malnutrition at this time. Discussed current diet order Regular Diet; provided pt with diet education regarding importance of meeting nutritional needs; amenable to education. Pt reports lack of appetite during current admission; offered trial of Ensure (pt declined); willing to trial LPS 2x/day (100 kcal, 15 g protein per serving). Made aware RD remains available. RD to follow up per protocol. See nutrition recommendations below.

## 2021-08-23 NOTE — PROGRESS NOTE ADULT - ASSESSMENT
Long discussion with patient and her daughter Deidra who was on the phone...    Pt still insists she was taking aspirin and plavix at home, however we found to effect of either when pt admitted...while in the hospital pt had a supratherapeutic response on Plavix 150 mg, and then 75mg, and a theurapeutic response on Asopirin 325 mg...all this was discussed with pt in detail...she was told to stop taking the Aspirin and plavix [facility] and get new prescription filled at her local  [facility]...new prescriptions given to pt...    Pt is to be DCed on NO Plavix for now, and Aspirin 325 mg with repeat Aspirin Response and P2y12 to be done at her [facility] lab on Wednesday...I placed orders in the computer for her blood work...    Discussed multiple times with Dr. Jc and the NS PAs
71 year old female w/ HTN, Osteoarthritis, Anxiety, GERD, overactive bladder with headaches, found to have b/l PCOMM aneurysms on cerebral angiogram 04/2021 now s/p elective Flow diverter Pipeline stent (8/18/21).

## 2021-08-23 NOTE — PROGRESS NOTE ADULT - SUBJECTIVE AND OBJECTIVE BOX
HPI:  71 year old female w/ Osteoarthritis, HTN, Anxiety, GERD presents for elective cerebral angiography. Patient reports chronic neck and head pain, but otherwise denies visual or hearing changes, extremity weakness or numbness, or gait changes. 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 She presents today for Pipeline stent placement for likely right PCOMM aneurysm. She has been on Aspirin and Plavix in anticipation of this procedure. (18 Aug 2021 15:07)    OVERNIGHT EVENTS: No significant events.    Hospital Course8/18: POD# 0 S/P femoral cerebral angiogram for R ICA pipeline stent for PComm aneurysm.  Post op with oozing from R groin site, compressed x 30 mins with improvement.   8/19: POD 1 s/p pipeline stent. Pending accumetrics after AM ASA/Plavix.   8/20: POD 2, TONI overnightm neuro stable. Pending repeat accumentrics this afternoon.  8/21: POD 3. TONI overnight. p2y12 resulted supratherapeutic, pending repeat accumetrics in the afternoon   8/22: POD 4. TONI overnight. Repeat P2y12 and ASA assay in the morning. Hold plavix and ASA dose until Tuesday per heme recommendations  8/23: POD 5: TONI, pending repeat accumetrics of ASA/Plavix. Dr. Jaymie rodriguez. Anticipate DC home.    Vital Signs Last 24 Hrs  T(C): 36.7 (23 Aug 2021 00:00), Max: 37.1 (22 Aug 2021 14:54)  T(F): 98 (23 Aug 2021 00:00), Max: 98.7 (22 Aug 2021 14:54)  HR: 64 (23 Aug 2021 00:16) (64 - 92)  BP: 127/59 (23 Aug 2021 00:16) (113/64 - 127/59)  BP(mean): 85 (23 Aug 2021 00:16) (79 - 85)  RR: 18 (23 Aug 2021 00:16) (18 - 18)  SpO2: 95% (23 Aug 2021 00:16) (94% - 98%)    I&O's Summary    21 Aug 2021 07:01  -  22 Aug 2021 07:00  --------------------------------------------------------  IN: 0 mL / OUT: 2700 mL / NET: -2700 mL    22 Aug 2021 07:01  -  23 Aug 2021 03:48  --------------------------------------------------------  IN: 0 mL / OUT: 900 mL / NET: -900 mL        Physical Exam: Gen: NAD, AAOx3.  HEENT: PERRL. EOMI. VF grossly intact.  Neck: FROM, nontender  Lungs: Clear b/l  Heart: S1, S2. NSR.  Abd: Soft, NT/ND. +BS  Exts: Pulses 2+ throughout. Right groin mild ecchymosis/hematoma.  Neuro: CNs II-XII intact. 5/5 str x4 extremities. Sensation to LT intact. Following commands. Gait WNL.    TUBES/LINES:  [] Crowley  [] Wound Drains  [] Others      DIET:  [] NPO  [x] Mechanical  [] Tube feeds    LABS:                        13.0   8.59  )-----------( 269      ( 22 Aug 2021 11:58 )             39.0     08-22    139  |  101  |  15  ----------------------------<  109<H>  4.0   |  28  |  0.90    Ca    9.7      22 Aug 2021 11:58  Phos  3.8     08-22  Mg     2.3     08-22              CAPILLARY BLOOD GLUCOSE      POCT Blood Glucose.: 99 mg/dL (22 Aug 2021 16:26)  POCT Blood Glucose.: 119 mg/dL (22 Aug 2021 11:39)  POCT Blood Glucose.: 107 mg/dL (22 Aug 2021 07:07)      Drug Levels: [] N/A    CSF Analysis: [] N/A      Allergies    No Known Allergies    Intolerances      MEDICATIONS:  Antibiotics:    Neuro:  acetaminophen   Tablet .. 650 milliGRAM(s) Oral every 6 hours PRN  acetaminophen 300 mG/codeine 30 mG 1 Tablet(s) Oral every 6 hours PRN  diazepam    Tablet 5 milliGRAM(s) Oral every 8 hours PRN    Anticoagulation:    OTHER:  aluminum hydroxide/magnesium hydroxide/simethicone Suspension 30 milliLiter(s) Oral every 4 hours PRN  amLODIPine   Tablet 10 milliGRAM(s) Oral daily  ATENolol  Tablet 50 milliGRAM(s) Oral daily  pantoprazole    Tablet 40 milliGRAM(s) Oral before breakfast  simethicone 80 milliGRAM(s) Chew every 12 hours PRN    IVF:  calcium carbonate 1250 mG  + Vitamin D (OsCal 500 + D) 1 Tablet(s) Oral three times a day    CULTURES:  Culture Results:   >100,000 CFU/ml Klebsiella oxytoca (08-18 @ 17:09)    RADIOLOGY & ADDITIONAL TESTS:      ASSESSMENT:  71 year old female w/ HTN, Osteoarthritis, Anxiety, GERD, overactive bladder with headaches, found to have b/l PCOMM aneurysms on cerebral angiogram 04/2021 now s/p elective Flow diverter Pipeline stent of right PCOMM aneurysm (8/18/21).      No pertinent family history in first degree relatives    Handoff    MEWS Score    No pertinent past medical history    No pertinent past medical history    HTN (hypertension)    GERD (gastroesophageal reflux disease)    Anxiety    Osteoporosis    Overactive bladder    Posterior communicating artery aneurysm    Posterior communicating artery aneurysm    Angiogram, cerebral, with embolization    Cerebral aneurysm    Posterior communicating artery aneurysm    Cerebral aneurysm    HTN (hypertension)    GERD (gastroesophageal reflux disease)    Overactive bladder    Osteoporosis    Encounter for monitoring aspirin therapy    Normocytic anemia    Stage 3 chronic kidney disease    Acute UTI    Encounter for patient compliance monitoring in drug treatment program    Angiogram, cerebral, with embolization    History of cholecystectomy    H/O shoulder surgery    HTN (hypertension)    GERD (gastroesophageal reflux disease)    Overactive bladder    Osteoporosis    Normocytic anemia    Encounter for monitoring aspirin therapy    Stage 3 chronic kidney disease    SysAdmin_VstLnk        PLAN:  NEURO:  - neuro/vitals/groin/pulse checks q4h  - pain control PRN, valium for back spasm  - Holding ASA/Plavix due to hypertherapeutic values, repeat accumetrics this morning.    CARDIOVASCULAR:  - normotensive SBP goal  - continue home meds: atenolol, amlodipine    PULMONARY:  - stable on RA    RENAL:  - voiding     GI:  - ADAT  - bowel regimen    HEME:  - SCDs for DVT ppx  - Dr. Jaymie rodriguez for p2y12 monitoring with Plavix/ASA.    ID:  - Klebsiella UTI on admission,  - started on Bactrim x 3 days for + UA - complete 8/21    ENDO:  - n/a    DISPO:  - full code, SDU  - dispo pending home   - Discussed with Dr. Jc        Assessment:  Present when checked    []  GCS  E   V  M     Heart Failure: []Acute, [] acute on chronic , []chronic  Heart Failure:  [] Diastolic (HFpEF), [] Systolic (HFrEF), []Combined (HFpEF and HFrEF), [] RHF, [] Pulm HTN, [] Other    [] TOM, [] ATN, [] AIN, [] other  [] CKD1, [] CKD2, [] CKD 3, [] CKD 4, [] CKD 5, []ESRD    Encephalopathy: [] Metabolic, [] Hepatic, [] toxic, [] Neurological, [] Other    Abnormal Nurtitional Status: [] malnurtition (see nutrition note), [ ]underweight: BMI < 19, [] morbid obesity: BMI >40, [] Cachexia    [] Sepsis  [] hypovolemic shock,[] cardiogenic shock, [] hemorrhagic shock, [] neuogenic shock  [] Acute Respiratory Failure  []Cerebral edema, [] Brain compression/ herniation,   [] Functional quadriplegia  [] Acute blood loss anemia

## 2021-08-23 NOTE — PROGRESS NOTE ADULT - SUBJECTIVE AND OBJECTIVE BOX
Patient is a 71y old  Female who presents with a chief complaint of stent placement (19 Aug 2021 11:17)      HPI:  71 year old female w/ Osteoarthritis, HTN, Anxiety, GERD presents for elective cerebral angiography. Patient reports chronic neck and head pain, but otherwise denies visual or hearing changes, extremity weakness or numbness, or gait changes. 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 She presents today for Pipeline stent placement for likely right PCOMM aneurysm. She has been on Aspirin and Plavix in anticipation of this procedure. (18 Aug 2021 15:07)    Subjective:  Pt reports that her previous suprapubic pain and back pain have resolved. Denies SOB, CP. ROS is otherwise negative.     Allergies    No Known Allergies    Intolerances      MEDICATIONS  (STANDING):  amLODIPine   Tablet 10 milliGRAM(s) Oral daily  ATENolol  Tablet 50 milliGRAM(s) Oral daily  calcium carbonate 1250 mG  + Vitamin D (OsCal 500 + D) 1 Tablet(s) Oral three times a day  pantoprazole    Tablet 40 milliGRAM(s) Oral before breakfast    MEDICATIONS  (PRN):  acetaminophen   Tablet .. 650 milliGRAM(s) Oral every 6 hours PRN Temp greater or equal to 38.5C (101.3F), Moderate Pain (4 - 6)  acetaminophen 300 mG/codeine 30 mG 1 Tablet(s) Oral every 6 hours PRN Severe Pain (7 - 10)  aluminum hydroxide/magnesium hydroxide/simethicone Suspension 30 milliLiter(s) Oral every 4 hours PRN Dyspepsia  diazepam    Tablet 5 milliGRAM(s) Oral every 8 hours PRN muscle spasm  simethicone 80 milliGRAM(s) Chew every 12 hours PRN Gas            Drug Dosing Weight      PAST MEDICAL & SURGICAL HISTORY:  HTN (hypertension)    GERD (gastroesophageal reflux disease)    Anxiety    Osteoporosis    Overactive bladder    History of cholecystectomy    H/O shoulder surgery        FAMILY HISTORY:  No pertinent family history in first degree relatives        SOCIAL HISTORY:    ADVANCE DIRECTIVES:      Vital Signs Last 24 Hrs  T(C): 36.9 (23 Aug 2021 05:00), Max: 37.1 (22 Aug 2021 14:54)  T(F): 98.5 (23 Aug 2021 05:00), Max: 98.7 (22 Aug 2021 14:54)  HR: 80 (23 Aug 2021 08:30) (64 - 80)  BP: 139/65 (23 Aug 2021 08:30) (105/52 - 139/65)  BP(mean): 94 (23 Aug 2021 08:30) (73 - 94)  RR: 18 (23 Aug 2021 08:30) (18 - 18)  SpO2: 97% (23 Aug 2021 08:30) (95% - 98%)        PHYSICAL EXAM:      Constitutional: NAD  Eyes: PERRLA  ENMT: MMM  Neck: supple  Back: midline  Respiratory: CTA b/l  Cardiovascular: rrr, s1s2, no m/r/g  Gastrointestinal: soft, NTND, + BS  Genitourinary: + suprapubic TTP  Extremities: wwp  Vascular: + 2 pulses radial  Neurological: AAO x 4  Skin: no rash  Lymph Nodes: no LAD  Musculoskeletal: no joint swelling  Psychiatric: normal affect        LABS:                          12.9   7.95  )-----------( 264      ( 23 Aug 2021 07:09 )             39.5   08-23    138  |  103  |  18  ----------------------------<  118<H>  3.9   |  25  |  0.80    Ca    9.9      23 Aug 2021 07:09  Phos  3.9     08-23  Mg     1.9     08-23              EKG:    ECHO, US:    RADIOLOGY:    CRITICAL CARE TIME SPENT:

## 2021-08-23 NOTE — PROGRESS NOTE ADULT - PROBLEM SELECTOR PLAN 1
stable
stable
Management as per primary team   -S/p Stent  cont , Plavix 75; Pending Accumetrics 8/21 at 12pm  -Results c/w pt non compliance as per Heme
Management as per primary team   -S/p Stent  cont , Plavix 75; Pending Accumetrics 8/21 at 12pm
Management as per primary team   -S/p Stent  cont , Plavix 75; Pending Accumetrics 8/21 at 12pm  -Results c/w pt non compliance as per Heme

## 2021-08-23 NOTE — PROGRESS NOTE ADULT - PROBLEM SELECTOR PLAN 3
Hold plavix till wednesday, repeat blood work on Wednesday...
Pt is supertherapeutic on Plavix, since she was given 150 mg yesterday, because we suspected resistance to drug...    At this point it's clear pt does not have resistence to drug, but rather non compliance...as stated above failure to comply with DAP, might have severe consequence such as stroke...    recommend DC pt on Plavix 75 mg and Aspirin 325mg daily, with close monitoring of her taking the meds, and close montoring of p2y12, and Aspirin response monitoring...
C/w Protonix, Maalox

## 2021-08-23 NOTE — PROGRESS NOTE ADULT - PROVIDER SPECIALTY LIST ADULT
NSICU
Neurosurgery
Hospitalist
Heme/Onc
Hospitalist
Heme/Onc
Hospitalist

## 2021-08-23 NOTE — PROGRESS NOTE ADULT - PROBLEM SELECTOR PLAN 7
E. Coli UTI s/t Bactrim  -C/w Bactrim

## 2021-08-23 NOTE — DIETITIAN INITIAL EVALUATION ADULT. - OTHER CALCULATIONS
Based on Standards of Care pt >% IBW thus ideal body weight used for all calculations. Needs adjusted for post op healing.

## 2021-08-23 NOTE — PROGRESS NOTE ADULT - TIME BILLING
Medical management
discussed with JULIO Cha, Dr. Jc x2
Medical management
Discussed and communicated with WON Ross and Pete and Dr. Awad.
Medical management

## 2021-08-23 NOTE — PROGRESS NOTE ADULT - SUBJECTIVE AND OBJECTIVE BOX
HEALTH ISSUES - PROBLEM Dx:  Cerebral aneurysm    HTN (hypertension)    GERD (gastroesophageal reflux disease)    Overactive bladder    Osteoporosis    Posterior communicating artery aneurysm  bilateral PCOMM    Encounter for monitoring aspirin therapy    Normocytic anemia    Stage 3 chronic kidney disease    Acute UTI    Encounter for patient compliance monitoring in drug treatment program            CHEMOTHERAPY REGIMEN:        Day:                          Diet:  Protocol:                                    IVF:      MEDICATIONS  (STANDING):  amLODIPine   Tablet 10 milliGRAM(s) Oral daily  ATENolol  Tablet 50 milliGRAM(s) Oral daily  calcium carbonate 1250 mG  + Vitamin D (OsCal 500 + D) 1 Tablet(s) Oral three times a day  pantoprazole    Tablet 40 milliGRAM(s) Oral before breakfast    MEDICATIONS  (PRN):  acetaminophen   Tablet .. 650 milliGRAM(s) Oral every 6 hours PRN Temp greater or equal to 38.5C (101.3F), Moderate Pain (4 - 6)  acetaminophen 300 mG/codeine 30 mG 1 Tablet(s) Oral every 6 hours PRN Severe Pain (7 - 10)  aluminum hydroxide/magnesium hydroxide/simethicone Suspension 30 milliLiter(s) Oral every 4 hours PRN Dyspepsia  diazepam    Tablet 5 milliGRAM(s) Oral every 8 hours PRN muscle spasm  simethicone 80 milliGRAM(s) Chew every 12 hours PRN Gas      Allergies    No Known Allergies    Intolerances        DVT Prophylaxis: [ ] YES [ ] NO      Antibiotics: [ ] YES [ ] NO    Pain Scale (1-10):       Location:    Vital Signs Last 24 Hrs  T(C): 36.7 (23 Aug 2021 17:09), Max: 37 (23 Aug 2021 13:30)  T(F): 98 (23 Aug 2021 17:09), Max: 98.6 (23 Aug 2021 13:30)  HR: 70 (23 Aug 2021 12:30) (64 - 80)  BP: 126/58 (23 Aug 2021 12:30) (105/52 - 139/65)  BP(mean): 84 (23 Aug 2021 12:20) (73 - 94)  RR: 18 (23 Aug 2021 12:30) (18 - 18)  SpO2: 96% (23 Aug 2021 12:30) (95% - 98%)    Drug Dosing Weight  Height (cm): 167.6 (20 Aug 2021 08:54)  Weight (kg): 70.307 (20 Aug 2021 08:54)  BMI (kg/m2): 25 (20 Aug 2021 08:54)  BSA (m2): 1.79 (20 Aug 2021 08:54)     Physical Exam:  · Constitutional	detailed exam  · Constitutional Details	well-developed; well-groomed  · Eyes	EOMI; PERRL; no drainage or redness  · ENMT Comments	dry mucous membranes  · Respiratory	detailed exam  · Respiratory Comments	normal breath sounds at the lung bases bilaterally  · Cardiovascular	Regular rate & rhythm, normal S1, S2; no murmurs, gallops or rubs; no S3, S4  · Abd-Soft non tender  ·Ext-no edema, clubbing or cyanosis    URINARY CATHETER: [ ] YES [ ] NO     LABS:  CBC Full  -  ( 23 Aug 2021 07:09 )  WBC Count : 7.95 K/uL  RBC Count : 4.36 M/uL  Hemoglobin : 12.9 g/dL  Hematocrit : 39.5 %  Platelet Count - Automated : 264 K/uL  Mean Cell Volume : 90.6 fl  Mean Cell Hemoglobin : 29.6 pg  Mean Cell Hemoglobin Concentration : 32.7 gm/dL  Auto Neutrophil # : x  Auto Lymphocyte # : x  Auto Monocyte # : x  Auto Eosinophil # : x  Auto Basophil # : x  Auto Neutrophil % : x  Auto Lymphocyte % : x  Auto Monocyte % : x  Auto Eosinophil % : x  Auto Basophil % : x    08-23    138  |  103  |  18  ----------------------------<  118<H>  3.9   |  25  |  0.80    Ca    9.9      23 Aug 2021 07:09  Phos  3.9     08-23  Mg     1.9     08-23            CULTURES:    RADIOLOGY & ADDITIONAL STUDIES:

## 2021-08-23 NOTE — DIETITIAN INITIAL EVALUATION ADULT. - PERTINENT LABORATORY DATA
Sodium, Serum: 138 mmol/L  Potassium, Serum: 3.9 mmol/L  Chloride, Serum: 103 mmol/L  BUN, Serum: 18 mg/dL  Creatinine, Serum: 0.80 mg/dL  Glucose, Serum: 118 mg/dL (Elevated)  Calcium, Serum: 9.9 mg/dL  Magnesium, Serum: 1.9 mg/dL  Phosphorus, Serum: 3.9 mg/dL    Last HbA1c 5.7% (8/19); no noted h/o diabetes

## 2021-08-23 NOTE — PROGRESS NOTE ADULT - PROBLEM SELECTOR PLAN 2
while in the hospital pt's aspirin response is as predicted...    I therefor suspect pt is NOT resistant to Aspirin, but rather NON compliant...this issue was discussed extensively with Pete UPTON and Dr. Awad...    they will impress upon patient the grave consequences of failure to comply with DAP treatment for 6 months, including but not limited to stroke...
Norvasc 10 mg PO qd, Atenolol   -Pt is currently normotensive
see above...DC on Aspirin 325 mg as pt is supratherapeutic on Plavix...
Norvasc 10 mg PO qd  -Pt is currently normotensive
Norvasc 10 mg PO qd, Atenolol   -Pt is currently normotensive

## 2021-08-23 NOTE — DIETITIAN INITIAL EVALUATION ADULT. - ENTER TO (CAL/KG)
Patient on apixiban for DVT diagnosed two years ago in her LLE  Consider switching to lovenox in setting of cancer   30

## 2021-08-23 NOTE — PROGRESS NOTE ADULT - PROBLEM SELECTOR PROBLEM 3
GERD (gastroesophageal reflux disease)
Encounter for patient compliance monitoring in drug treatment program
GERD (gastroesophageal reflux disease)
GERD (gastroesophageal reflux disease)
Encounter for patient compliance monitoring in drug treatment program

## 2021-08-23 NOTE — PROGRESS NOTE ADULT - PROBLEM SELECTOR PROBLEM 2
HTN (hypertension)
Encounter for monitoring aspirin therapy
Encounter for monitoring aspirin therapy
HTN (hypertension)
HTN (hypertension)

## 2021-08-23 NOTE — PROGRESS NOTE ADULT - PROBLEM SELECTOR PROBLEM 4
Posterior communicating artery aneurysm

## 2021-08-23 NOTE — DIETITIAN INITIAL EVALUATION ADULT. - ADD RECOMMEND
1. Continue with current diet order 2. Encourage pt to meet nutritional needs as able 3. Encourage adherence to diet education

## 2021-08-24 ENCOUNTER — NON-APPOINTMENT (OUTPATIENT)
Age: 71
End: 2021-08-24

## 2021-08-25 ENCOUNTER — LABORATORY RESULT (OUTPATIENT)
Age: 71
End: 2021-08-25

## 2021-08-25 PROBLEM — I10 ESSENTIAL (PRIMARY) HYPERTENSION: Chronic | Status: ACTIVE | Noted: 2021-08-18

## 2021-08-25 PROBLEM — K21.9 GASTRO-ESOPHAGEAL REFLUX DISEASE WITHOUT ESOPHAGITIS: Chronic | Status: ACTIVE | Noted: 2021-08-18

## 2021-08-25 PROBLEM — F41.9 ANXIETY DISORDER, UNSPECIFIED: Chronic | Status: ACTIVE | Noted: 2021-08-18

## 2021-08-25 PROBLEM — M81.0 AGE-RELATED OSTEOPOROSIS WITHOUT CURRENT PATHOLOGICAL FRACTURE: Chronic | Status: ACTIVE | Noted: 2021-08-18

## 2021-08-25 PROBLEM — N32.81 OVERACTIVE BLADDER: Chronic | Status: ACTIVE | Noted: 2021-08-18

## 2021-08-25 RX ORDER — CLOPIDOGREL BISULFATE 75 MG/1
1 TABLET, FILM COATED ORAL
Qty: 30 | Refills: 0
Start: 2021-08-25 | End: 2021-09-23

## 2021-08-26 ENCOUNTER — APPOINTMENT (OUTPATIENT)
Dept: HEMATOLOGY ONCOLOGY | Facility: CLINIC | Age: 71
End: 2021-08-26
Payer: MEDICARE

## 2021-08-26 DIAGNOSIS — Z51.81 ENCOUNTER FOR THERAPEUTIC DRUG LVL MONITORING: ICD-10-CM

## 2021-08-26 DIAGNOSIS — Z79.02 ENCOUNTER FOR THERAPEUTIC DRUG LVL MONITORING: ICD-10-CM

## 2021-08-26 PROCEDURE — 99214 OFFICE O/P EST MOD 30 MIN: CPT | Mod: 95

## 2021-08-26 NOTE — HISTORY OF PRESENT ILLNESS
[Home] : at home, [unfilled] , at the time of the visit. [Other Location: e.g. Home (Enter Location, City,State)___] : at [unfilled] [Other:____] : [unfilled] [Verbal consent obtained from patient] : the patient, [unfilled] [de-identified] : 71 years old white female s/p recent placement of a cerebral stent... Initially patient was thought to be resistant to aspirin and Plavix since both her aspirin response and her \par P2 Y 12 were none therapeutic...\par \par During her hospitalization after the stent placement, patient was therapeutic on aspirin 325 mg daily, and she became supratherapeutic when given Plavix 150 mg.\par \par Patient had repeat blood work yesterday, her aspirin affect was perfect, and she was below therapeutic on Plavix, as the Plavix was held... Patient restarted the Plavix today

## 2021-08-26 NOTE — ASSESSMENT
[FreeTextEntry1] : Discussed with patient and her daughter the blood results from yesterday...\par \par Patient was advised to take the Plavix and aspirin on a full stomach PC breakfast, and then have the repeat blood work done within 4 to 6 hours on September 1.\par \par We will adjust the dose of aspirin and Plavix as indicated.\par \par All this was discussed in detail with patient and her daughter Deidra, and patient's neurosurgeon Dr. Alden Jc.

## 2021-08-27 LAB — PA ADP PRP-ACNC: 162 PRU

## 2021-09-01 ENCOUNTER — LABORATORY RESULT (OUTPATIENT)
Age: 71
End: 2021-09-01

## 2021-09-03 DIAGNOSIS — D64.9 ANEMIA, UNSPECIFIED: ICD-10-CM

## 2021-09-03 DIAGNOSIS — N18.30 CHRONIC KIDNEY DISEASE, STAGE 3 UNSPECIFIED: ICD-10-CM

## 2021-09-03 DIAGNOSIS — F41.9 ANXIETY DISORDER, UNSPECIFIED: ICD-10-CM

## 2021-09-03 DIAGNOSIS — M19.90 UNSPECIFIED OSTEOARTHRITIS, UNSPECIFIED SITE: ICD-10-CM

## 2021-09-03 DIAGNOSIS — Z91.14 PATIENT'S OTHER NONCOMPLIANCE WITH MEDICATION REGIMEN: ICD-10-CM

## 2021-09-03 DIAGNOSIS — N39.0 URINARY TRACT INFECTION, SITE NOT SPECIFIED: ICD-10-CM

## 2021-09-03 DIAGNOSIS — I67.1 CEREBRAL ANEURYSM, NONRUPTURED: ICD-10-CM

## 2021-09-03 DIAGNOSIS — B96.20 UNSPECIFIED ESCHERICHIA COLI [E. COLI] AS THE CAUSE OF DISEASES CLASSIFIED ELSEWHERE: ICD-10-CM

## 2021-09-03 DIAGNOSIS — I12.9 HYPERTENSIVE CHRONIC KIDNEY DISEASE WITH STAGE 1 THROUGH STAGE 4 CHRONIC KIDNEY DISEASE, OR UNSPECIFIED CHRONIC KIDNEY DISEASE: ICD-10-CM

## 2021-09-03 DIAGNOSIS — K21.9 GASTRO-ESOPHAGEAL REFLUX DISEASE WITHOUT ESOPHAGITIS: ICD-10-CM

## 2021-09-03 DIAGNOSIS — M81.0 AGE-RELATED OSTEOPOROSIS WITHOUT CURRENT PATHOLOGICAL FRACTURE: ICD-10-CM

## 2021-09-03 DIAGNOSIS — T39.016A UNDERDOSING OF ASPIRIN, INITIAL ENCOUNTER: ICD-10-CM

## 2021-09-15 ENCOUNTER — LABORATORY RESULT (OUTPATIENT)
Age: 71
End: 2021-09-15

## 2021-09-16 LAB — PA ADP PRP-ACNC: 13 PRU

## 2021-10-19 ENCOUNTER — APPOINTMENT (OUTPATIENT)
Dept: HEMATOLOGY ONCOLOGY | Facility: CLINIC | Age: 71
End: 2021-10-19
Payer: MEDICARE

## 2021-10-19 VITALS
SYSTOLIC BLOOD PRESSURE: 145 MMHG | BODY MASS INDEX: 29.73 KG/M2 | DIASTOLIC BLOOD PRESSURE: 80 MMHG | WEIGHT: 185 LBS | HEIGHT: 66 IN | OXYGEN SATURATION: 96 % | HEART RATE: 67 BPM | TEMPERATURE: 97.2 F

## 2021-10-19 PROCEDURE — 36415 COLL VENOUS BLD VENIPUNCTURE: CPT

## 2021-10-19 PROCEDURE — 99214 OFFICE O/P EST MOD 30 MIN: CPT | Mod: 25

## 2021-10-20 ENCOUNTER — LABORATORY RESULT (OUTPATIENT)
Age: 71
End: 2021-10-20

## 2021-10-25 ENCOUNTER — NON-APPOINTMENT (OUTPATIENT)
Age: 71
End: 2021-10-25

## 2021-10-25 LAB
25(OH)D3 SERPL-MCNC: 39.4 NG/ML
ALBUMIN SERPL ELPH-MCNC: 4.6 G/DL
ALP BLD-CCNC: 61 U/L
ALT SERPL-CCNC: 8 U/L
ANION GAP SERPL CALC-SCNC: 11 MMOL/L
AST SERPL-CCNC: 14 U/L
BASOPHILS # BLD AUTO: 0.05 K/UL
BASOPHILS NFR BLD AUTO: 0.8 %
BILIRUB SERPL-MCNC: 0.3 MG/DL
BUN SERPL-MCNC: 11 MG/DL
CALCIUM SERPL-MCNC: 9.4 MG/DL
CHLORIDE SERPL-SCNC: 103 MMOL/L
CO2 SERPL-SCNC: 26 MMOL/L
CREAT SERPL-MCNC: 0.66 MG/DL
EOSINOPHIL # BLD AUTO: 0.27 K/UL
EOSINOPHIL NFR BLD AUTO: 4.2 %
GLUCOSE SERPL-MCNC: 96 MG/DL
HCT VFR BLD CALC: 35.4 %
HGB BLD-MCNC: 11.6 G/DL
IMM GRANULOCYTES NFR BLD AUTO: 0.3 %
LYMPHOCYTES # BLD AUTO: 2.6 K/UL
LYMPHOCYTES NFR BLD AUTO: 40.8 %
MAN DIFF?: NORMAL
MCHC RBC-ENTMCNC: 30.2 PG
MCHC RBC-ENTMCNC: 32.8 GM/DL
MCV RBC AUTO: 92.2 FL
MONOCYTES # BLD AUTO: 0.72 K/UL
MONOCYTES NFR BLD AUTO: 11.3 %
NEUTROPHILS # BLD AUTO: 2.71 K/UL
NEUTROPHILS NFR BLD AUTO: 42.6 %
PA ADP PRP-ACNC: 93 PRU
PLATELET # BLD AUTO: 246 K/UL
POTASSIUM SERPL-SCNC: 4.1 MMOL/L
PROT SERPL-MCNC: 7.1 G/DL
RBC # BLD: 3.84 M/UL
RBC # FLD: 13.2 %
SODIUM SERPL-SCNC: 141 MMOL/L
TSH SERPL-ACNC: 2.53 UIU/ML
VIT B12 SERPL-MCNC: 307 PG/ML
WBC # FLD AUTO: 6.37 K/UL

## 2021-11-16 ENCOUNTER — LABORATORY RESULT (OUTPATIENT)
Age: 71
End: 2021-11-16

## 2021-11-16 LAB
ALBUMIN SERPL ELPH-MCNC: 4.5 G/DL
ALP BLD-CCNC: 64 U/L
ALT SERPL-CCNC: 10 U/L
ANION GAP SERPL CALC-SCNC: 13 MMOL/L
APTT BLD: 31.1 SEC
AST SERPL-CCNC: 12 U/L
BASOPHILS # BLD AUTO: 0.07 K/UL
BASOPHILS NFR BLD AUTO: 1 %
BILIRUB SERPL-MCNC: 0.2 MG/DL
BUN SERPL-MCNC: 13 MG/DL
CALCIUM SERPL-MCNC: 9.9 MG/DL
CHLORIDE SERPL-SCNC: 102 MMOL/L
CO2 SERPL-SCNC: 25 MMOL/L
CREAT SERPL-MCNC: 0.7 MG/DL
EOSINOPHIL # BLD AUTO: 0.28 K/UL
EOSINOPHIL NFR BLD AUTO: 3.9 %
ERYTHROCYTE [SEDIMENTATION RATE] IN BLOOD BY WESTERGREN METHOD: 14 MM/HR
GLUCOSE SERPL-MCNC: 106 MG/DL
HCT VFR BLD CALC: 36.4 %
HGB BLD-MCNC: 12 G/DL
IMM GRANULOCYTES NFR BLD AUTO: 0.6 %
INR PPP: 0.96 RATIO
LDH SERPL-CCNC: 165 U/L
LYMPHOCYTES # BLD AUTO: 2.48 K/UL
LYMPHOCYTES NFR BLD AUTO: 34.7 %
MAN DIFF?: NORMAL
MCHC RBC-ENTMCNC: 30.7 PG
MCHC RBC-ENTMCNC: 33 GM/DL
MCV RBC AUTO: 93.1 FL
MONOCYTES # BLD AUTO: 0.84 K/UL
MONOCYTES NFR BLD AUTO: 11.8 %
NEUTROPHILS # BLD AUTO: 3.43 K/UL
NEUTROPHILS NFR BLD AUTO: 48 %
PA ADP PRP-ACNC: 159 PRU
PLATELET # BLD AUTO: 272 K/UL
POTASSIUM SERPL-SCNC: 4.2 MMOL/L
PROT SERPL-MCNC: 7.1 G/DL
PT BLD: 11.3 SEC
RBC # BLD: 3.91 M/UL
RBC # FLD: 13.2 %
SODIUM SERPL-SCNC: 140 MMOL/L
WBC # FLD AUTO: 7.14 K/UL

## 2021-11-16 NOTE — CONSULT LETTER
[Dear  ___] : Dear  [unfilled], [Consult Letter:] : I had the pleasure of evaluating your patient, [unfilled]. [Please see my note below.] : Please see my note below. [Consult Closing:] : Thank you very much for allowing me to participate in the care of this patient.  If you have any questions, please do not hesitate to contact me. [Sincerely,] : Sincerely, [FreeTextEntry3] : Arabella Coon MD\par

## 2021-11-16 NOTE — ADDENDUM
[FreeTextEntry1] : November 16, 2021 repeat blood work within normal limits... Patient hematologically cleared for procedure indicated

## 2021-11-16 NOTE — HISTORY OF PRESENT ILLNESS
[Other:____] : [unfilled] [de-identified] : 71 years old white female s/p recent placement of a cerebral stent... Initially patient was thought to be resistant to aspirin and Plavix since both her aspirin response and her \par P2 Y 12 were none therapeutic...\par \par During her hospitalization after the stent placement, patient was therapeutic on aspirin 325 mg daily, and she became supratherapeutic when given Plavix 150 mg.\par \par Patient had repeat blood work yesterday, her aspirin affect was perfect, and she was below therapeutic on Plavix, as the Plavix was held... Patient restarted the Plavix today\par \par October 19, 2021 patient returns for follow-up... She stated she did repeat platelet aspirin effect and P2 Y 12 testing at [facility]  laboratory last week, however results are not available on HIE or via the phone

## 2021-11-16 NOTE — ASSESSMENT
[FreeTextEntry1] : Discussed with patient and her daughter the blood results from yesterday...\par \par Discussed with patient's daughter Rishabh patient's results... Her Aspirin and Plavix response is optimal... Patient to continue same aspirin dose daily, and same Plavix dose q. OD.\par \par Discussed with daughter the fact that the vitamin B12 is low, and patient will be started on vitamin B12 1000 mg p.o. daily.\par \par Follow-up here as needed.\par \par \par \par

## 2021-12-09 ENCOUNTER — APPOINTMENT (OUTPATIENT)
Dept: INTERNAL MEDICINE | Facility: CLINIC | Age: 71
End: 2021-12-09
Payer: MEDICARE

## 2021-12-09 VITALS
BODY MASS INDEX: 29.57 KG/M2 | DIASTOLIC BLOOD PRESSURE: 84 MMHG | TEMPERATURE: 97.8 F | OXYGEN SATURATION: 96 % | WEIGHT: 184 LBS | SYSTOLIC BLOOD PRESSURE: 147 MMHG | HEIGHT: 66 IN | HEART RATE: 61 BPM

## 2021-12-09 DIAGNOSIS — L03.116 CELLULITIS OF LEFT LOWER LIMB: ICD-10-CM

## 2021-12-09 DIAGNOSIS — S80.12XS CONTUSION OF LEFT LOWER LEG, SEQUELA: ICD-10-CM

## 2021-12-09 PROCEDURE — 99213 OFFICE O/P EST LOW 20 MIN: CPT

## 2021-12-09 RX ORDER — CIPROFLOXACIN HYDROCHLORIDE 500 MG/1
500 TABLET, FILM COATED ORAL
Qty: 14 | Refills: 0 | Status: DISCONTINUED | COMMUNITY
Start: 2021-07-12 | End: 2021-12-09

## 2021-12-09 RX ORDER — ASPIRIN 81 MG
81 TABLET, DELAYED RELEASE (ENTERIC COATED) ORAL DAILY
Refills: 0 | Status: DISCONTINUED | COMMUNITY
End: 2021-12-09

## 2021-12-09 NOTE — HISTORY OF PRESENT ILLNESS
[FreeTextEntry8] : CC:  f/u\par \par Patient evaluated at  in [place] 12/5 secondary to left ankle erythema, pain and swelling. DVT study negative.\par Found to have hematoma along ankle and cellulitis, but denies fall or trauma. She currently is on Keflex and steriod taper.\par She does endorsement of swelling and pain compared to initial presentation at .\par Also on plavix every other day and ASA-follows w/ hematology. \par Declining to do labs as will be having labs completed in [place] on Monday.\par \par

## 2021-12-09 NOTE — PLAN
[FreeTextEntry1] : Improving swelling and pain per patient, c/w keflex\par Declines labs today, will f/u with labs w/ hematology on Monday\par

## 2021-12-09 NOTE — PHYSICAL EXAM
[Normal] : no acute distress, well nourished, well developed and well-appearing [de-identified] : Localized area of swelling and tenderness of anterior ankle, faint erythema along lateral ankle

## 2021-12-13 RX ORDER — CEPHALEXIN 500 MG/1
500 CAPSULE ORAL
Qty: 40 | Refills: 0 | Status: ACTIVE | COMMUNITY
Start: 2021-12-05

## 2021-12-22 DIAGNOSIS — Z00.00 ENCOUNTER FOR GENERAL ADULT MEDICAL EXAMINATION W/OUT ABNORMAL FINDINGS: ICD-10-CM

## 2021-12-27 ENCOUNTER — LABORATORY RESULT (OUTPATIENT)
Age: 71
End: 2021-12-27

## 2021-12-28 ENCOUNTER — APPOINTMENT (OUTPATIENT)
Dept: OBGYN | Facility: CLINIC | Age: 71
End: 2021-12-28
Payer: MEDICARE

## 2021-12-28 ENCOUNTER — RX RENEWAL (OUTPATIENT)
Age: 71
End: 2021-12-28

## 2021-12-28 VITALS
SYSTOLIC BLOOD PRESSURE: 145 MMHG | DIASTOLIC BLOOD PRESSURE: 80 MMHG | HEIGHT: 66 IN | BODY MASS INDEX: 29.73 KG/M2 | WEIGHT: 185 LBS

## 2021-12-28 DIAGNOSIS — N39.0 URINARY TRACT INFECTION, SITE NOT SPECIFIED: ICD-10-CM

## 2021-12-28 DIAGNOSIS — B37.3 CANDIDIASIS OF VULVA AND VAGINA: ICD-10-CM

## 2021-12-28 PROCEDURE — 87210 SMEAR WET MOUNT SALINE/INK: CPT | Mod: QW

## 2021-12-28 PROCEDURE — 99214 OFFICE O/P EST MOD 30 MIN: CPT

## 2021-12-28 NOTE — REVIEW OF SYSTEMS
Patient wants phone call back today with answer to medication issue from previous encounter on 12/11/9   [Nl] : Integumentary

## 2021-12-28 NOTE — CHIEF COMPLAINT
[FreeTextEntry1] : c/o low pelvic pain x 6 days. S/p treatment of leg infection with antibiotics and developed burning and itching temporarily relieved with Diflucan 150 mg x 1. u/a: bl sm; nit pos and leuk large

## 2021-12-28 NOTE — PHYSICAL EXAM
[Discharge] : a  ~M vaginal discharge was present [Scant] : scant [White] : white [Thin] : thin [FreeTextEntry4] : mod tender anteriorly

## 2022-01-04 NOTE — PHYSICAL EXAM
PULMONARY AND CRITICAL CARE MEDICINE CONSULTATION    Date of Consultation:  1/4/2022    Requesting Physician:  Bertin Mcdaniels MD    Consulting Physician:  Antony Russ MD    Reason for Consultation: Unstable angina    Chief Complaint: Unstable angina    History of Present Illness:    I was kindly asked to see and evaluate Antony Akbar Sr., a 63 y.o. male for evaluation and management of the above problem.    This gentleman has a history of CAD with prior PCI and subsequent two-vessel CABG in 2012 in [place], [place].  Additionally, he has a history of a permanent pacemaker placement, anxiety, primary hypertension, hypothyroidism, COPD, dyslipidemia and chronic pain.  He presented to the emergency department yesterday with chest pain.  The pain is a tightness in the center of his chest which radiates up towards his neck and down his arms and is associated with diaphoresis and nausea.  He denies fever, chills, myalgias, arthralgias, purulent sputum production, diarrhea, abdominal pain,, dysuria, urgency, frequency or hematuria.  He has been seen by cardiology and requires a nitroglycerin drip to be titrated for his unstable angina.    Medications Prior to Admission:    No current facility-administered medications on file prior to encounter.     Current Outpatient Medications on File Prior to Encounter   Medication Sig Dispense Refill   • levothyroxine (SYNTHROID) 50 MCG Tab Take 50 mcg by mouth every morning on an empty stomach.     • morphine ER (MS CONTIN) 15 MG Tab CR tablet Take 15 mg by mouth 3 times a day as needed (Pain).     • vitamin D3 (CHOLECALCIFEROL) 1000 Unit (25 mcg) Tab Take 1,000 Units by mouth every day.     • metoprolol SR (TOPROL XL) 50 MG TABLET SR 24 HR Take 1 Tablet by mouth 2 (two) times a day. 60 Tablet 3   • nitroglycerin (NITROSTAT) 0.4 MG SL Tab Place 1 Tablet under the tongue 1 time a day as needed for Chest Pain (chest pain). 25 Tablet 2   • atorvastatin (LIPITOR) 40  MG Tab Take 1 Tablet by mouth every day. 90 Tablet 2   • cloNIDine (CATAPRES) 0.1 MG Tab Take 1 Tablet by mouth every morning. 90 Tablet 2   • clopidogrel (PLAVIX) 75 MG Tab Take 1 Tablet by mouth every morning. 90 Tablet 2   • cyclobenzaprine (FLEXERIL) 10 mg Tab Take 1 Tablet by mouth 3 times a day as needed for Moderate Pain. 30 Tablet 0   • famotidine (PEPCID) 40 MG Tab Take 1 Tablet by mouth every evening. 90 Tablet 2   • isosorbide mononitrate SR (IMDUR) 60 MG TABLET SR 24 HR Take 1 Tablet by mouth 2 times a day. 180 Tablet 2   • lisinopril (PRINIVIL) 40 MG tablet Take 1 Tablet by mouth every day. 90 Tablet 2   • tamsulosin (FLOMAX) 0.4 MG capsule Take 1 Capsule by mouth every evening. 90 Capsule 2   • gabapentin (NEURONTIN) 100 MG Cap Take 100 mg by mouth 4 times a day.     • sertraline (ZOLOFT) 100 MG Tab Take 100 mg by mouth every evening.     • EPINEPHrine (EPIPEN 2-VEL) 0.3 MG/0.3ML Solution Auto-injector solution for injection Inject 0.3 mg into the shoulder, thigh, or buttocks as needed.     • albuterol 108 (90 Base) MCG/ACT Aero Soln inhalation aerosol Inhale 2 Puffs every 6 hours as needed for Shortness of Breath.         Current Medications:      Current Facility-Administered Medications:   •  gabapentin (NEURONTIN) capsule 100 mg, 100 mg, Oral, 4X/DAY, Bertin Mcdaniels M.D.  •  tamsulosin (FLOMAX) capsule 0.4 mg, 0.4 mg, Oral, Q EVENING, Bertin Mcdaniels M.D.  •  LORazepam (ATIVAN) tablet 0.5 mg, 0.5 mg, Oral, Q4HRS PRN, Bertin Mcdaniels M.D., 0.5 mg at 01/04/22 1035  •  [START ON 1/5/2022] aspirin EC (ECOTRIN) tablet 81 mg, 81 mg, Oral, DAILY, French Herrera M.D.  •  nitroglycerin 50 mg in D5W 250 ml infusion, 0-200 mcg/min, Intravenous, Continuous, French Herrera M.D., Last Rate: 3 mL/hr at 01/04/22 1134, 10 mcg/min at 01/04/22 1134  •  potassium chloride SA (Kdur) tablet 40 mEq, 40 mEq, Oral, Q2HRS, Antony Russ M.D.  •  magnesium sulfate IVPB premix 2 g, 2 g, Intravenous,  Once, Antony Russ M.D.  •  heparin infusion 25,000 units in 500 mL 0.45% NACL, 0-30 Units/kg/hr (Adjusted), Intravenous, Continuous, French Herrera M.D.  •  heparin injection 5,900 Units, 80 Units/kg (Adjusted), Intravenous, Once, French Herrera M.D.  •  heparin injection 3,000 Units, 40 Units/kg (Adjusted), Intravenous, PRN, French Herrera M.D.  •  [START ON 1/5/2022] isosorbide mononitrate SR (IMDUR) tablet 60 mg, 60 mg, Oral, Q DAY, French Herrera M.D.  •  morphine ER (MS CONTIN) tablet 15 mg, 15 mg, Oral, TID PRN, Monique Mares M.D., 15 mg at 01/04/22 0829  •  atorvastatin (LIPITOR) tablet 40 mg, 40 mg, Oral, DAILY, Milagros Belle M.D., 40 mg at 01/04/22 0902  •  cloNIDine (CATAPRES) tablet 0.1 mg, 0.1 mg, Oral, QAM, Milagros Belle M.D., 0.1 mg at 01/04/22 0829  •  albuterol inhaler 2 Puff, 2 Puff, Inhalation, Q6HRS PRN (RT), Milagros Belle M.D.  •  clopidogrel (PLAVIX) tablet 75 mg, 75 mg, Oral, QAM, Milagros Belle M.D., 75 mg at 01/04/22 0829  •  metoprolol SR (TOPROL XL) tablet 50 mg, 50 mg, Oral, BID, Milagros Belle M.D., 50 mg at 01/03/22 2304  •  lisinopril (PRINIVIL) tablet 40 mg, 40 mg, Oral, DAILY, Milagros Belle M.D., 40 mg at 01/04/22 0829  •  sertraline (Zoloft) tablet 100 mg, 100 mg, Oral, Q EVENING, Milagros Belle M.D., 100 mg at 01/03/22 2307  •  famotidine (PEPCID) tablet 20 mg, 20 mg, Oral, Q EVENING, Milagros Belle M.D., 20 mg at 01/03/22 2304  •  cyclobenzaprine (Flexeril) tablet 10 mg, 10 mg, Oral, TID PRN, Milagros Belle M.D.  •  levothyroxine (SYNTHROID) tablet 50 mcg, 50 mcg, Oral, AM ES, Milagros Belle M.D., 50 mcg at 01/04/22 0828  •  senna-docusate (PERICOLACE or SENOKOT S) 8.6-50 MG per tablet 2 Tablet, 2 Tablet, Oral, BID **AND** polyethylene glycol/lytes (MIRALAX) PACKET 1 Packet, 1 Packet, Oral, QDAY PRN **AND** magnesium hydroxide (MILK OF MAGNESIA) suspension 30 mL, 30 mL, Oral, QDAY PRN **AND** bisacodyl (DULCOLAX) suppository 10 mg, 10 mg,  Rectal, QDAY PRN, Milagros Belle M.D.  •  regadenoson (LEXISCAN) injection SOLN 0.4 mg, 0.4 mg, Intravenous, Once PRN, Milagros Belle M.D.  •  aminophylline injection 100 mg, 100 mg, Intravenous, Q5 MIN PRN, Milagros Belle M.D.  •  acetaminophen (Tylenol) tablet 650 mg, 650 mg, Oral, Q6HRS PRN, Milagros Belle M.D.  •  labetalol (NORMODYNE/TRANDATE) injection 10 mg, 10 mg, Intravenous, Q4HRS PRN, Milagros Belle M.D., 10 mg at 01/04/22 0009  •  ondansetron (ZOFRAN) syringe/vial injection 4 mg, 4 mg, Intravenous, Q4HRS PRN, Milagros Belle M.D.  •  ondansetron (ZOFRAN ODT) dispertab 4 mg, 4 mg, Oral, Q4HRS PRN, Milagros Belle M.D.  •  promethazine (PHENERGAN) tablet 12.5-25 mg, 12.5-25 mg, Oral, Q4HRS PRN, Milagros Belle M.D.  •  promethazine (PHENERGAN) suppository 12.5-25 mg, 12.5-25 mg, Rectal, Q4HRS PRN, Milagros Belle M.D.  •  prochlorperazine (COMPAZINE) injection 5-10 mg, 5-10 mg, Intravenous, Q4HRS PRN, Milagros Belle M.D.  •  mag hydrox-al hydrox-simeth (MAALOX PLUS ES or MYLANTA DS) suspension 30 mL, 30 mL, Oral, Q4HRS PRN, Milagros Belle M.D.  •  guaiFENesin dextromethorphan (ROBITUSSIN DM) 100-10 MG/5ML syrup 10 mL, 10 mL, Oral, Q6HRS PRN, Milagros Belle M.D.  •  nicotine (NICODERM) 14 MG/24HR 14 mg, 14 mg, Transdermal, Daily-0600 **AND** Nicotine Replacement Patient Education Materials, , , Once **AND** nicotine polacrilex (NICORETTE) 2 MG piece 2 mg, 2 mg, Oral, Q HOUR PRN, Milagros Belle M.D.  •  morphine 4 MG/ML injection 2 mg, 2 mg, Intravenous, Q3HRS PRN, Milagros Belle M.D., 2 mg at 01/04/22 0112    Current Outpatient Medications:   •  levothyroxine (SYNTHROID) 50 MCG Tab, Take 50 mcg by mouth every morning on an empty stomach., Disp: , Rfl:   •  morphine ER (MS CONTIN) 15 MG Tab CR tablet, Take 15 mg by mouth 3 times a day as needed (Pain)., Disp: , Rfl:   •  vitamin D3 (CHOLECALCIFEROL) 1000 Unit (25 mcg) Tab, Take 1,000 Units by mouth every day., Disp: ,  [Normal] : uterus Rfl:   •  metoprolol SR (TOPROL XL) 50 MG TABLET SR 24 HR, Take 1 Tablet by mouth 2 (two) times a day., Disp: 60 Tablet, Rfl: 3  •  nitroglycerin (NITROSTAT) 0.4 MG SL Tab, Place 1 Tablet under the tongue 1 time a day as needed for Chest Pain (chest pain)., Disp: 25 Tablet, Rfl: 2  •  atorvastatin (LIPITOR) 40 MG Tab, Take 1 Tablet by mouth every day., Disp: 90 Tablet, Rfl: 2  •  cloNIDine (CATAPRES) 0.1 MG Tab, Take 1 Tablet by mouth every morning., Disp: 90 Tablet, Rfl: 2  •  clopidogrel (PLAVIX) 75 MG Tab, Take 1 Tablet by mouth every morning., Disp: 90 Tablet, Rfl: 2  •  cyclobenzaprine (FLEXERIL) 10 mg Tab, Take 1 Tablet by mouth 3 times a day as needed for Moderate Pain., Disp: 30 Tablet, Rfl: 0  •  famotidine (PEPCID) 40 MG Tab, Take 1 Tablet by mouth every evening., Disp: 90 Tablet, Rfl: 2  •  isosorbide mononitrate SR (IMDUR) 60 MG TABLET SR 24 HR, Take 1 Tablet by mouth 2 times a day., Disp: 180 Tablet, Rfl: 2  •  lisinopril (PRINIVIL) 40 MG tablet, Take 1 Tablet by mouth every day., Disp: 90 Tablet, Rfl: 2  •  tamsulosin (FLOMAX) 0.4 MG capsule, Take 1 Capsule by mouth every evening., Disp: 90 Capsule, Rfl: 2  •  gabapentin (NEURONTIN) 100 MG Cap, Take 100 mg by mouth 4 times a day., Disp: , Rfl:   •  sertraline (ZOLOFT) 100 MG Tab, Take 100 mg by mouth every evening., Disp: , Rfl:   •  EPINEPHrine (EPIPEN 2-VEL) 0.3 MG/0.3ML Solution Auto-injector solution for injection, Inject 0.3 mg into the shoulder, thigh, or buttocks as needed., Disp: , Rfl:   •  albuterol 108 (90 Base) MCG/ACT Aero Soln inhalation aerosol, Inhale 2 Puffs every 6 hours as needed for Shortness of Breath., Disp: , Rfl:     Allergies:    Bee venom    Past Surgical History:    Past Surgical History:   Procedure Laterality Date   • PA OPEN FIX INTER/SUBTROCH FX,IMPLNT Left 8/4/2021    Procedure: INSERTION, INTRAMEDULLARY MIKAEL, FEMUR, PROXIMAL;  Surgeon: Valentin Ríos M.D.;  Location: SURGERY [facility];  Service: Orthopedics   •  [No Bleeding] : there was no active vaginal bleeding CARDIAC CATH, RIGHT/LEFT HEART         Past Medical History:    Past Medical History:   Diagnosis Date   • Asthma    • Back pain    • BPH (benign prostatic hyperplasia)    • Chronic obstructive pulmonary disease (HCC)    • Hypertension    • Hypothyroid        Social History:    Social History     Socioeconomic History   • Marital status: Single     Spouse name: Not on file   • Number of children: Not on file   • Years of education: Not on file   • Highest education level: Not on file   Occupational History   • Not on file   Tobacco Use   • Smoking status: Former Smoker   • Smokeless tobacco: Never Used   Vaping Use   • Vaping Use: Never used   Substance and Sexual Activity   • Alcohol use: Not Currently   • Drug use: Not Currently   • Sexual activity: Not on file   Other Topics Concern   • Not on file   Social History Narrative   • Not on file     Social Determinants of Health     Financial Resource Strain:    • Difficulty of Paying Living Expenses: Not on file   Food Insecurity:    • Worried About Running Out of Food in the Last Year: Not on file   • Ran Out of Food in the Last Year: Not on file   Transportation Needs:    • Lack of Transportation (Medical): Not on file   • Lack of Transportation (Non-Medical): Not on file   Physical Activity:    • Days of Exercise per Week: Not on file   • Minutes of Exercise per Session: Not on file   Stress:    • Feeling of Stress : Not on file   Social Connections:    • Frequency of Communication with Friends and Family: Not on file   • Frequency of Social Gatherings with Friends and Family: Not on file   • Attends Gnosticism Services: Not on file   • Active Member of Clubs or Organizations: Not on file   • Attends Club or Organization Meetings: Not on file   • Marital Status: Not on file   Intimate Partner Violence:    • Fear of Current or Ex-Partner: Not on file   • Emotionally Abused: Not on file   • Physically Abused: Not on file   • Sexually Abused: Not on file   Housing  [Uterine Adnexae] : were not tender and not enlarged "Stability:    • Unable to Pay for Housing in the Last Year: Not on file   • Number of Places Lived in the Last Year: Not on file   • Unstable Housing in the Last Year: Not on file       Family History:    No family history on file.    Review of System:    Review of Systems   Constitutional: Negative for chills and fever.   HENT: Negative for ear discharge, sinus pain and sore throat.    Eyes: Negative for pain, discharge and redness.   Respiratory: Negative for hemoptysis and sputum production.    Cardiovascular: Positive for chest pain. Negative for palpitations and leg swelling.   Gastrointestinal: Negative for abdominal pain, diarrhea and vomiting.   Genitourinary: Negative for dysuria, hematuria and urgency.   Musculoskeletal: Negative for myalgias and neck pain.   Skin: Negative for rash.   Neurological: Negative for seizures, loss of consciousness and headaches.   Endo/Heme/Allergies: Does not bruise/bleed easily.   Psychiatric/Behavioral: Negative for hallucinations.       Physical Examination:    /86   Pulse 76   Temp 36.7 °C (98 °F) (Temporal)   Resp 16   Ht 1.702 m (5' 7\")   Wt 86.2 kg (190 lb)   SpO2 98%   BMI 29.76 kg/m²   Physical Exam  Constitutional:       Appearance: He is not diaphoretic.   HENT:      Head: Normocephalic.      Nose: Nose normal.      Mouth/Throat:      Pharynx: Oropharynx is clear.   Eyes:      Pupils: Pupils are equal, round, and reactive to light.   Cardiovascular:      Comments: Sinus rhythm  Pulmonary:      Breath sounds: No wheezing or rales.   Abdominal:      General: There is no distension.      Tenderness: There is no abdominal tenderness.   Musculoskeletal:      Cervical back: Normal range of motion.      Right lower leg: No edema.      Left lower leg: No edema.   Skin:     General: Skin is warm.   Neurological:      General: No focal deficit present.      Mental Status: He is oriented to person, place, and time.      Cranial Nerves: No cranial nerve deficit. "         Laboratory Data:        Recent Labs     01/03/22  1155 01/04/22  0305   WBC 18.4* 11.0*   RBC 5.91 5.00   HEMOGLOBIN 15.3 12.7*   HEMATOCRIT 47.6 40.9*   MCV 80.5* 81.8   MCH 25.9* 25.4*   MCHC 32.1* 31.1*   RDW 50.5* 52.9*   PLATELETCT 218 156*   MPV 10.9 10.6     Recent Labs     01/03/22  1155 01/04/22  0305   SODIUM 143 143   POTASSIUM 3.6 3.6   CHLORIDE 105 107   CO2 21 22   GLUCOSE 76 93   BUN 10 8   CREATININE 0.84 0.86   CALCIUM 9.5 8.6                   Imaging:    I personally viewed all the available CXR and CT scan images as well as reviewed the radiology interpretation reports.    CT-CTA CHEST PULMONARY ARTERY W/ RECONS   Final Result      1.  No CT evidence of pulmonary embolism.      2.  Cholelithiasis.      3.  Calcific atherosclerosis.      4.  1 cm mass within each adrenal gland. These could represent adrenal adenomas. Follow-up noncontrast CT of the adrenal glands could be obtained for further evaluation if clinically indicated.            DX-CHEST-PORTABLE (1 VIEW)   Final Result   Addendum 1 of 1   Signed      Final      1.  No acute cardiac or pulmonary abnormalities are identified.      NM-CARDIAC STRESS TEST    (Results Pending)       Assessment and Plan:    * Unstable angina (HCC)- (present on admission)  Assessment & Plan  Known history of CAD with prior PCI and two-vessel CABG in 2012  I am titrating a nitroglycerin drip to eliminate angina  Continue isosorbide mononitrate, 60 mg twice daily  Continue metoprolol succinate, 50 mg twice daily  Continue aspirin and Plavix  Continue full anticoagulation with heparin with anti-Xa monitoring  Cardiology is following    Sepsis (HCC)- (present on admission)  Assessment & Plan  Sepsis is ruled out  While leukocytosis is present, he has no evidence clinically of an acute infectious process  Blood cultures are negative so far  Procalcitonin is normal, for what it's worth  UA unremarkable  Stop ceftriaxone and observe off antibiotics    CAD  (coronary artery disease) of artery bypass graft- (present on admission)  Assessment & Plan  Known CAD with prior PCI and subsequent two-vessel CABG in 2012    Primary hypertension- (present on admission)  Assessment & Plan  Goal SBP less than 160  Continue clonidine, 0.1 mg daily  Continue lisinopril, 40 mg daily  Continue metoprolol succinate, 50 mg twice daily    COPD (chronic obstructive pulmonary disease) (HCC)- (present on admission)  Assessment & Plan  No acute exacerbation  RT protocols    HLD (hyperlipidemia)  Assessment & Plan  Continue statin    Anxiety- (present on admission)  Assessment & Plan       Hypothyroidism- (present on admission)  Assessment & Plan  Levothyroxine    I have assessed and reassessed his blood pressure, hemodynamics and cardiovascular status with titration of a nitroglycerin drip.  He is at increased risk for worsening cardiovascular system dysfunction.    High risk of deterioration and worsening vital organ dysfunction and death without the above critical care interventions.    Thank you for allowing me to participate in the care of this gentleman.    He may be safely admitted to the [facility].  Please do not hesitate to contact [facility] if you have any questions or if he develops any clinical deterioration.    The patient is critically ill.  Critical care time = 35 minutes in directly providing and coordinating critical care and extensive data review.  No time overlap and excludes procedures.    Discussed with Dr. Deanne Mcdaniels, ARNULFO Russ MD  Pulmonary and Critical Care Medicine

## 2022-01-07 ENCOUNTER — APPOINTMENT (OUTPATIENT)
Dept: INTERNAL MEDICINE | Facility: CLINIC | Age: 72
End: 2022-01-07
Payer: MEDICARE

## 2022-01-07 ENCOUNTER — NON-APPOINTMENT (OUTPATIENT)
Age: 72
End: 2022-01-07

## 2022-01-07 VITALS
HEIGHT: 66 IN | HEART RATE: 64 BPM | WEIGHT: 189 LBS | OXYGEN SATURATION: 96 % | BODY MASS INDEX: 30.37 KG/M2 | TEMPERATURE: 98.3 F

## 2022-01-07 VITALS — DIASTOLIC BLOOD PRESSURE: 70 MMHG | SYSTOLIC BLOOD PRESSURE: 130 MMHG

## 2022-01-07 DIAGNOSIS — R21 RASH AND OTHER NONSPECIFIC SKIN ERUPTION: ICD-10-CM

## 2022-01-07 PROCEDURE — 36415 COLL VENOUS BLD VENIPUNCTURE: CPT

## 2022-01-07 PROCEDURE — 99214 OFFICE O/P EST MOD 30 MIN: CPT | Mod: 25

## 2022-01-07 PROCEDURE — 93000 ELECTROCARDIOGRAM COMPLETE: CPT

## 2022-01-10 ENCOUNTER — APPOINTMENT (OUTPATIENT)
Dept: INTERNAL MEDICINE | Facility: CLINIC | Age: 72
End: 2022-01-10

## 2022-01-10 DIAGNOSIS — Z79.82 LONG TERM (CURRENT) USE OF ASPIRIN: ICD-10-CM

## 2022-01-10 LAB
ANION GAP SERPL CALC-SCNC: 12 MMOL/L
APPEARANCE: CLEAR
BACTERIA UR CULT: NORMAL
BACTERIA: NEGATIVE
BASOPHILS # BLD AUTO: 0.07 K/UL
BASOPHILS NFR BLD AUTO: 0.8 %
BILIRUBIN URINE: NEGATIVE
BLOOD URINE: NEGATIVE
BUN SERPL-MCNC: 19 MG/DL
CALCIUM SERPL-MCNC: 9.6 MG/DL
CHLORIDE SERPL-SCNC: 103 MMOL/L
CO2 SERPL-SCNC: 24 MMOL/L
COLOR: YELLOW
CREAT SERPL-MCNC: 0.94 MG/DL
EOSINOPHIL # BLD AUTO: 0.49 K/UL
EOSINOPHIL NFR BLD AUTO: 5.9 %
GLUCOSE QUALITATIVE U: NEGATIVE
GLUCOSE SERPL-MCNC: 109 MG/DL
HCT VFR BLD CALC: 35.9 %
HGB BLD-MCNC: 11.9 G/DL
HYALINE CASTS: 1 /LPF
IMM GRANULOCYTES NFR BLD AUTO: 0.6 %
INR PPP: 0.91 RATIO
KETONES URINE: NEGATIVE
LEUKOCYTE ESTERASE URINE: ABNORMAL
LYMPHOCYTES # BLD AUTO: 2.87 K/UL
LYMPHOCYTES NFR BLD AUTO: 34.8 %
MAN DIFF?: NORMAL
MCHC RBC-ENTMCNC: 30 PG
MCHC RBC-ENTMCNC: 33.1 GM/DL
MCV RBC AUTO: 90.4 FL
MICROSCOPIC-UA: NORMAL
MONOCYTES # BLD AUTO: 0.95 K/UL
MONOCYTES NFR BLD AUTO: 11.5 %
NEUTROPHILS # BLD AUTO: 3.82 K/UL
NEUTROPHILS NFR BLD AUTO: 46.4 %
NITRITE URINE: NEGATIVE
PA ADP PRP-ACNC: 17 PRU
PH URINE: 6
PLATELET # BLD AUTO: 305 K/UL
POTASSIUM SERPL-SCNC: 4.3 MMOL/L
PROTEIN URINE: NORMAL
PT BLD: 10.8 SEC
RBC # BLD: 3.97 M/UL
RBC # FLD: 13.4 %
RED BLOOD CELLS URINE: 3 /HPF
SODIUM SERPL-SCNC: 139 MMOL/L
SPECIFIC GRAVITY URINE: 1.03
SQUAMOUS EPITHELIAL CELLS: 5 /HPF
UROBILINOGEN URINE: NORMAL
WBC # FLD AUTO: 8.25 K/UL
WHITE BLOOD CELLS URINE: 8 /HPF

## 2022-01-10 NOTE — PHYSICAL EXAM
[Normal] : soft, non-tender, non-distended, no masses palpated, no HSM and normal bowel sounds [de-identified] : scat raised mac lesions on anterior chest and upper back

## 2022-01-10 NOTE — ASSESSMENT
[FreeTextEntry1] : stop sulfa drug\par short course oral steroids\par \par presurgical labs, ecg, cxr reviewed\par No Medical Contraindications to Procedure

## 2022-01-18 ENCOUNTER — APPOINTMENT (OUTPATIENT)
Dept: OBGYN | Facility: CLINIC | Age: 72
End: 2022-01-18

## 2022-01-18 ENCOUNTER — TRANSCRIPTION ENCOUNTER (OUTPATIENT)
Age: 72
End: 2022-01-18

## 2022-01-18 LAB
ALBUMIN SERPL ELPH-MCNC: 4.6 G/DL
ALP BLD-CCNC: 65 U/L
ALT SERPL-CCNC: 29 U/L
ANION GAP SERPL CALC-SCNC: 14 MMOL/L
APTT BLD: 28.4 SEC
AST SERPL-CCNC: 24 U/L
BILIRUB SERPL-MCNC: 0.2 MG/DL
BUN SERPL-MCNC: 16 MG/DL
CALCIUM SERPL-MCNC: 9.6 MG/DL
CHLORIDE SERPL-SCNC: 100 MMOL/L
CO2 SERPL-SCNC: 25 MMOL/L
CREAT SERPL-MCNC: 0.77 MG/DL
GLUCOSE SERPL-MCNC: 91 MG/DL
PA ADP PRP-ACNC: 98 PRU
PLATELET RESPONSE ASPIRIN: 394 ARU
POTASSIUM SERPL-SCNC: 3.8 MMOL/L
PROT SERPL-MCNC: 7.4 G/DL
SODIUM SERPL-SCNC: 140 MMOL/L
VIT B12 SERPL-MCNC: 705 PG/ML

## 2022-01-19 ENCOUNTER — NON-APPOINTMENT (OUTPATIENT)
Age: 72
End: 2022-01-19

## 2022-01-19 ENCOUNTER — INPATIENT (INPATIENT)
Facility: HOSPITAL | Age: 72
LOS: 0 days | Discharge: HOME | DRG: 26 | End: 2022-01-20
Attending: NEUROLOGICAL SURGERY | Admitting: NEUROLOGICAL SURGERY
Payer: COMMERCIAL

## 2022-01-19 VITALS
SYSTOLIC BLOOD PRESSURE: 143 MMHG | RESPIRATION RATE: 21 BRPM | DIASTOLIC BLOOD PRESSURE: 65 MMHG | TEMPERATURE: 97 F | HEART RATE: 93 BPM | OXYGEN SATURATION: 96 %

## 2022-01-19 DIAGNOSIS — F41.9 ANXIETY DISORDER, UNSPECIFIED: ICD-10-CM

## 2022-01-19 DIAGNOSIS — Z90.49 ACQUIRED ABSENCE OF OTHER SPECIFIED PARTS OF DIGESTIVE TRACT: ICD-10-CM

## 2022-01-19 DIAGNOSIS — Z98.890 OTHER SPECIFIED POSTPROCEDURAL STATES: Chronic | ICD-10-CM

## 2022-01-19 DIAGNOSIS — Z90.49 ACQUIRED ABSENCE OF OTHER SPECIFIED PARTS OF DIGESTIVE TRACT: Chronic | ICD-10-CM

## 2022-01-19 DIAGNOSIS — I10 ESSENTIAL (PRIMARY) HYPERTENSION: ICD-10-CM

## 2022-01-19 DIAGNOSIS — M19.90 UNSPECIFIED OSTEOARTHRITIS, UNSPECIFIED SITE: ICD-10-CM

## 2022-01-19 DIAGNOSIS — K21.9 GASTRO-ESOPHAGEAL REFLUX DISEASE WITHOUT ESOPHAGITIS: ICD-10-CM

## 2022-01-19 DIAGNOSIS — N32.81 OVERACTIVE BLADDER: ICD-10-CM

## 2022-01-19 DIAGNOSIS — Z86.79 PERSONAL HISTORY OF OTHER DISEASES OF THE CIRCULATORY SYSTEM: ICD-10-CM

## 2022-01-19 DIAGNOSIS — I67.1 CEREBRAL ANEURYSM, NONRUPTURED: ICD-10-CM

## 2022-01-19 DIAGNOSIS — Z98.890 OTHER SPECIFIED POSTPROCEDURAL STATES: ICD-10-CM

## 2022-01-19 DIAGNOSIS — N39.0 URINARY TRACT INFECTION, SITE NOT SPECIFIED: ICD-10-CM

## 2022-01-19 DIAGNOSIS — M81.0 AGE-RELATED OSTEOPOROSIS WITHOUT CURRENT PATHOLOGICAL FRACTURE: ICD-10-CM

## 2022-01-19 LAB
ANION GAP SERPL CALC-SCNC: 9 MMOL/L — SIGNIFICANT CHANGE UP (ref 5–17)
APTT BLD: 108.9 SEC — HIGH (ref 27.5–35.5)
BUN SERPL-MCNC: 12 MG/DL — SIGNIFICANT CHANGE UP (ref 7–23)
CALCIUM SERPL-MCNC: 7.9 MG/DL — LOW (ref 8.4–10.5)
CHLORIDE SERPL-SCNC: 109 MMOL/L — HIGH (ref 96–108)
CO2 SERPL-SCNC: 22 MMOL/L — SIGNIFICANT CHANGE UP (ref 22–31)
CREAT SERPL-MCNC: 0.6 MG/DL — SIGNIFICANT CHANGE UP (ref 0.5–1.3)
GLUCOSE BLDC GLUCOMTR-MCNC: 102 MG/DL — HIGH (ref 70–99)
GLUCOSE BLDC GLUCOMTR-MCNC: 157 MG/DL — HIGH (ref 70–99)
GLUCOSE SERPL-MCNC: 106 MG/DL — HIGH (ref 70–99)
HCT VFR BLD CALC: 30.9 % — LOW (ref 34.5–45)
HGB BLD-MCNC: 10.2 G/DL — LOW (ref 11.5–15.5)
INR BLD: 1.08 — SIGNIFICANT CHANGE UP (ref 0.88–1.16)
MAGNESIUM SERPL-MCNC: 1.6 MG/DL — SIGNIFICANT CHANGE UP (ref 1.6–2.6)
MCHC RBC-ENTMCNC: 30.2 PG — SIGNIFICANT CHANGE UP (ref 27–34)
MCHC RBC-ENTMCNC: 33 GM/DL — SIGNIFICANT CHANGE UP (ref 32–36)
MCV RBC AUTO: 91.4 FL — SIGNIFICANT CHANGE UP (ref 80–100)
NRBC # BLD: 0 /100 WBCS — SIGNIFICANT CHANGE UP (ref 0–0)
PHOSPHATE SERPL-MCNC: 3.6 MG/DL — SIGNIFICANT CHANGE UP (ref 2.5–4.5)
PLATELET # BLD AUTO: 177 K/UL — SIGNIFICANT CHANGE UP (ref 150–400)
POTASSIUM SERPL-MCNC: 3.5 MMOL/L — SIGNIFICANT CHANGE UP (ref 3.5–5.3)
POTASSIUM SERPL-SCNC: 3.5 MMOL/L — SIGNIFICANT CHANGE UP (ref 3.5–5.3)
PROTHROM AB SERPL-ACNC: 12.9 SEC — SIGNIFICANT CHANGE UP (ref 10.6–13.6)
RBC # BLD: 3.38 M/UL — LOW (ref 3.8–5.2)
RBC # FLD: 14.1 % — SIGNIFICANT CHANGE UP (ref 10.3–14.5)
SODIUM SERPL-SCNC: 140 MMOL/L — SIGNIFICANT CHANGE UP (ref 135–145)
WBC # BLD: 6.92 K/UL — SIGNIFICANT CHANGE UP (ref 3.8–10.5)
WBC # FLD AUTO: 6.92 K/UL — SIGNIFICANT CHANGE UP (ref 3.8–10.5)

## 2022-01-19 PROCEDURE — 74018 RADEX ABDOMEN 1 VIEW: CPT | Mod: 26

## 2022-01-19 RX ORDER — GABAPENTIN 400 MG/1
1 CAPSULE ORAL
Qty: 0 | Refills: 0 | DISCHARGE

## 2022-01-19 RX ORDER — INSULIN LISPRO 100/ML
VIAL (ML) SUBCUTANEOUS AT BEDTIME
Refills: 0 | Status: DISCONTINUED | OUTPATIENT
Start: 2022-01-19 | End: 2022-01-20

## 2022-01-19 RX ORDER — GLUCAGON INJECTION, SOLUTION 0.5 MG/.1ML
1 INJECTION, SOLUTION SUBCUTANEOUS ONCE
Refills: 0 | Status: DISCONTINUED | OUTPATIENT
Start: 2022-01-19 | End: 2022-01-20

## 2022-01-19 RX ORDER — ASPIRIN/CALCIUM CARB/MAGNESIUM 324 MG
325 TABLET ORAL DAILY
Refills: 0 | Status: DISCONTINUED | OUTPATIENT
Start: 2022-01-20 | End: 2022-01-20

## 2022-01-19 RX ORDER — PANTOPRAZOLE SODIUM 20 MG/1
40 TABLET, DELAYED RELEASE ORAL
Refills: 0 | Status: DISCONTINUED | OUTPATIENT
Start: 2022-01-20 | End: 2022-01-20

## 2022-01-19 RX ORDER — ATENOLOL 25 MG/1
50 TABLET ORAL DAILY
Refills: 0 | Status: DISCONTINUED | OUTPATIENT
Start: 2022-01-19 | End: 2022-01-20

## 2022-01-19 RX ORDER — SODIUM CHLORIDE 9 MG/ML
1000 INJECTION INTRAMUSCULAR; INTRAVENOUS; SUBCUTANEOUS
Refills: 0 | Status: DISCONTINUED | OUTPATIENT
Start: 2022-01-19 | End: 2022-01-20

## 2022-01-19 RX ORDER — DEXTROSE 50 % IN WATER 50 %
12.5 SYRINGE (ML) INTRAVENOUS ONCE
Refills: 0 | Status: DISCONTINUED | OUTPATIENT
Start: 2022-01-19 | End: 2022-01-20

## 2022-01-19 RX ORDER — ACETAMINOPHEN 500 MG
1000 TABLET ORAL ONCE
Refills: 0 | Status: COMPLETED | OUTPATIENT
Start: 2022-01-19 | End: 2022-01-19

## 2022-01-19 RX ORDER — SODIUM CHLORIDE 9 MG/ML
1000 INJECTION, SOLUTION INTRAVENOUS
Refills: 0 | Status: DISCONTINUED | OUTPATIENT
Start: 2022-01-19 | End: 2022-01-20

## 2022-01-19 RX ORDER — CHLORHEXIDINE GLUCONATE 213 G/1000ML
1 SOLUTION TOPICAL ONCE
Refills: 0 | Status: DISCONTINUED | OUTPATIENT
Start: 2022-01-19 | End: 2022-01-20

## 2022-01-19 RX ORDER — DEXTROSE 50 % IN WATER 50 %
25 SYRINGE (ML) INTRAVENOUS ONCE
Refills: 0 | Status: DISCONTINUED | OUTPATIENT
Start: 2022-01-19 | End: 2022-01-20

## 2022-01-19 RX ORDER — CLOPIDOGREL BISULFATE 75 MG/1
75 TABLET, FILM COATED ORAL
Qty: 0 | Refills: 0 | DISCHARGE

## 2022-01-19 RX ORDER — ACETAMINOPHEN 500 MG
650 TABLET ORAL EVERY 6 HOURS
Refills: 0 | Status: DISCONTINUED | OUTPATIENT
Start: 2022-01-19 | End: 2022-01-20

## 2022-01-19 RX ORDER — AMLODIPINE BESYLATE 2.5 MG/1
10 TABLET ORAL DAILY
Refills: 0 | Status: DISCONTINUED | OUTPATIENT
Start: 2022-01-19 | End: 2022-01-20

## 2022-01-19 RX ORDER — OXYBUTYNIN CHLORIDE 5 MG
1 TABLET ORAL
Qty: 0 | Refills: 0 | DISCHARGE

## 2022-01-19 RX ORDER — DEXTROSE 50 % IN WATER 50 %
15 SYRINGE (ML) INTRAVENOUS ONCE
Refills: 0 | Status: DISCONTINUED | OUTPATIENT
Start: 2022-01-19 | End: 2022-01-20

## 2022-01-19 RX ORDER — INSULIN LISPRO 100/ML
VIAL (ML) SUBCUTANEOUS
Refills: 0 | Status: DISCONTINUED | OUTPATIENT
Start: 2022-01-19 | End: 2022-01-20

## 2022-01-19 RX ADMIN — Medication 400 MILLIGRAM(S): at 17:22

## 2022-01-19 RX ADMIN — Medication 1000 MILLIGRAM(S): at 23:00

## 2022-01-19 RX ADMIN — AMLODIPINE BESYLATE 10 MILLIGRAM(S): 2.5 TABLET ORAL at 17:14

## 2022-01-19 RX ADMIN — Medication 1000 MILLIGRAM(S): at 18:03

## 2022-01-19 RX ADMIN — Medication 400 MILLIGRAM(S): at 22:43

## 2022-01-19 RX ADMIN — SODIUM CHLORIDE 75 MILLILITER(S): 9 INJECTION INTRAMUSCULAR; INTRAVENOUS; SUBCUTANEOUS at 13:08

## 2022-01-19 RX ADMIN — ATENOLOL 50 MILLIGRAM(S): 25 TABLET ORAL at 17:38

## 2022-01-19 NOTE — H&P ADULT - ASSESSMENT
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

## 2022-01-19 NOTE — H&P ADULT - PROBLEM SELECTOR PLAN 1
- NPO/IVF  - Consent for cerebral angiogram with flow diverting stent placement obtained and placed in chart, risks and benefits of procedure explained including infection, hematoma, spinal headache, stroke. All questions answered.  - Outpatient clearance reviewed   - ICU admission post procedure  - c/w Plavix 75mg daily   - D/w Dr. Jc - NPO/IVF  - Consent for cerebral angiogram with flow diverting stent placement on LEFT PCOMM aneurysm obtained and placed in chart, risks and benefits of procedure explained including infection, hematoma, spinal headache, stroke. All questions answered.  - Outpatient clearance reviewed   - ICU admission post procedure  - c/w Plavix 75mg daily and aspirin 325mg daily (took today)  - D/w Dr. Jc - NPO/IVF  - Consent for cerebral angiogram with flow diverting stent placement on LEFT PCOMM aneurysm obtained and placed in chart, risks and benefits of procedure explained including infection, hematoma, spinal headache, stroke. All questions answered.  - Outpatient clearance reviewed   - ICU admission post procedure  - c/w Plavix 75mg every other day (next dose 1/21) and aspirin 325mg daily (took today)  - D/w Dr. Jc

## 2022-01-19 NOTE — H&P ADULT - NSHPPHYSICALEXAM_GEN_ALL_CORE
Neuro: A&Ox3, speech clear. Answers questions appropriately  CN: CN II-XII intact.   Facial movements symmetrical, no facial droop, tongue protrusion midline.   Motor strength: Full and equal 5/5 strength B/L upper and lower extremities  Sensory:  Sensation intact.  Cerebellar: normal finger to nose, normal heel to shin     CHEST/LUNG: Clear to auscultation bilaterally; No rales, rhonchi, wheezing, or rubs  HEART: Regular rate and rhythm; No murmurs, rubs, or gallops  ABDOMEN: Soft, Nontender, Nondistended; Bowel sounds present  EXTREMITIES:  2+ DP pulses, No clubbing, cyanosis, or edema  SKIN: No rashes or lesions

## 2022-01-19 NOTE — H&P ADULT - NSICDXPASTMEDICALHX_GEN_ALL_CORE_FT
PAST MEDICAL HISTORY:  Anxiety     GERD (gastroesophageal reflux disease)     History of cerebral aneurysm     HTN (hypertension)     Osteoporosis     Overactive bladder

## 2022-01-19 NOTE — PROGRESS NOTE ADULT - SUBJECTIVE AND OBJECTIVE BOX
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 Patient had outpatient workup for chronic neck and head pain. 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 On 8/2021 she was treated for right PCOMM aneurysm with a flow diverting stent placement. Denies visual changes, n/v/, weakness, gait imbalance. On Aspirin 325mg daily and Plavix 75mg every other day.       T(C): 36.3 (01-19-22 @ 15:57), Max: 36.3 (01-19-22 @ 15:57)  HR: 84 (01-19-22 @ 17:53) (84 - 93)  BP: 124/58 (01-19-22 @ 17:53) (121/60 - 143/65)  RR: 21 (01-19-22 @ 17:53) (14 - 25)  SpO2: 98% (01-19-22 @ 17:53) (95% - 99%)      01-19-22 @ 07:01  -  01-19-22 @ 18:30  --------------------------------------------------------  IN: 475 mL / OUT: 430 mL / NET: 45 mL        acetaminophen     Tablet .. 650 milliGRAM(s) Oral every 6 hours PRN  amLODIPine   Tablet 10 milliGRAM(s) Oral daily  ATENolol  Tablet 50 milliGRAM(s) Oral daily  chlorhexidine 4% Liquid 1 Application(s) Topical once  dextrose 40% Gel 15 Gram(s) Oral once  dextrose 5%. 1000 milliLiter(s) IV Continuous <Continuous>  dextrose 5%. 1000 milliLiter(s) IV Continuous <Continuous>  dextrose 50% Injectable 25 Gram(s) IV Push once  dextrose 50% Injectable 12.5 Gram(s) IV Push once  dextrose 50% Injectable 25 Gram(s) IV Push once  glucagon  Injectable 1 milliGRAM(s) IntraMuscular once  insulin lispro (ADMELOG) corrective regimen sliding scale   SubCutaneous three times a day before meals  insulin lispro (ADMELOG) corrective regimen sliding scale   SubCutaneous at bedtime  sodium chloride 0.9%. 1000 milliLiter(s) IV Continuous <Continuous>      RADIOLOGY:     Exam:  Constitutional: NAD, well groomed, well nourished  Respiratory: breathing non-labored, symmetrical chest wall movement  Cardiovascuar: RRR, no murmurs  Gastrointestinal: abdomen soft, non tender  Genitourinary: exam deffered  Neurological:  AAOX3. Face symmetric, Verbal function intact, speech clear  Cranial Nerves: II-XII intact  Motor: 5/5 power in b/l upper extremities and LLE, RLE motor exam deferred due to right groin puncture  Sensation: intact to light touch in all extremities  Pronator Drift: no  Dysmetria: no  Extremities: distal pulses 2+ x4  Wound/incision: Right groin clean and dry. Safe guard in place.       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 Patient had outpatient workup for chronic neck and head pain. 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 On 8/2021 she was treated for right PCOMM aneurysm with a flow diverting stent placement. Denies visual changes, n/v/, weakness, gait imbalance. On Aspirin 325mg daily and Plavix 75mg every other day. S/p  flow diverting stent for left PCOMM aneursym (1/19/22)      Plan:  Neuro  -neuro and vital checks q1h  -pain control as needed  -safe guard in place (remove 8pm 1/19/22)    Cardio  -normotensive  -cont home norvasc    Pulm  -on RA    GI  -ADAT    Renal  -replete electrolytes as needed    Heme  -f/u post op CBC  - daily and Plavix 75 every other day (next dose Friday 1/21/22)    Endo  -no active issues    ID  -afebrile    ICU status. family updated  d/w Dr. Jc     Assessment:  Present when checked    []  GCS  E   V  M     Heart Failure: []Acute, [] acute on chronic , []chronic  Heart Failure:  [] Diastolic (HFpEF), [] Systolic (HFrEF), []Combined (HFpEF and HFrEF), [] RHF, [] Pulm HTN, [] Other    [] TOM, [] ATN, [] AIN, [] other  [] CKD1, [] CKD2, [] CKD 3, [] CKD 4, [] CKD 5, []ESRD    Encephalopathy: [] Metabolic, [] Hepatic, [] toxic, [] Neurological, [] Other    Abnormal Nurtitional Status: [] malnurtition (see nutrition note), [ ]underweight: BMI < 19, [] morbid obesity: BMI >40, [] Cachexia    [] Sepsis  [] hypovolemic shock,[] cardiogenic shock, [] hemorrhagic shock, [] neuogenic shock  [] Acute Respiratory Failure  []Cerebral edema, [] Brain compression/ herniation,   [] Functional quadriplegia  [] Acute blood loss anemia

## 2022-01-19 NOTE — H&P ADULT - HISTORY OF PRESENT ILLNESS
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 Patient had outpatient workup for chronic neck and head pain. 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 On 8/2021 she was treated for right PCOMM aneurysm with a flow diverting stent placement.   Denies visual changes, n/v/, weakness, gait imbalance.  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 Patient had outpatient workup for chronic neck and head pain. 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 On 8/2021 she was treated for right PCOMM aneurysm with a flow diverting stent placement.   Denies visual changes, n/v/, weakness, gait imbalance.   On Aspirin 325mg daily and Plavix 75mg daily.  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 Patient had outpatient workup for chronic neck and head pain. 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 On 8/2021 she was treated for right PCOMM aneurysm with a flow diverting stent placement.   Denies visual changes, n/v/, weakness, gait imbalance.   On Aspirin 325mg daily and Plavix 75mg every other day.

## 2022-01-19 NOTE — H&P ADULT - NSICDXPASTSURGICALHX_GEN_ALL_CORE_FT
PAST SURGICAL HISTORY:  H/O shoulder surgery     History of cholecystectomy     S/P cerebral aneurysm operation

## 2022-01-19 NOTE — H&P ADULT - NSHPLABSRESULTS_GEN_ALL_CORE
CXR clear  EKG 64 normal sinus, no acute ST changes    WBC 8.25  H/H 11.9/35.9  Platelet 305  PT/INR 10.8/0.91  Cr 0.94 CXR clear  EKG 64 normal sinus, no acute ST changes  COVID negative    WBC 8.25  H/H 11.9/35.9  Platelet 305  PT/INR 10.8/0.91  Cr 0.94  P2Y12 98

## 2022-01-20 ENCOUNTER — TRANSCRIPTION ENCOUNTER (OUTPATIENT)
Age: 72
End: 2022-01-20

## 2022-01-20 VITALS
OXYGEN SATURATION: 97 % | RESPIRATION RATE: 22 BRPM | DIASTOLIC BLOOD PRESSURE: 55 MMHG | SYSTOLIC BLOOD PRESSURE: 113 MMHG

## 2022-01-20 LAB
A1C WITH ESTIMATED AVERAGE GLUCOSE RESULT: 5.9 % — HIGH (ref 4–5.6)
ANION GAP SERPL CALC-SCNC: 11 MMOL/L — SIGNIFICANT CHANGE UP (ref 5–17)
APPEARANCE UR: CLEAR — SIGNIFICANT CHANGE UP
BACTERIA # UR AUTO: PRESENT /HPF
BILIRUB UR-MCNC: NEGATIVE — SIGNIFICANT CHANGE UP
BUN SERPL-MCNC: 11 MG/DL — SIGNIFICANT CHANGE UP (ref 7–23)
CALCIUM SERPL-MCNC: 8.1 MG/DL — LOW (ref 8.4–10.5)
CHLORIDE SERPL-SCNC: 108 MMOL/L — SIGNIFICANT CHANGE UP (ref 96–108)
CO2 SERPL-SCNC: 23 MMOL/L — SIGNIFICANT CHANGE UP (ref 22–31)
COLOR SPEC: YELLOW — SIGNIFICANT CHANGE UP
COMMENT - URINE: SIGNIFICANT CHANGE UP
CREAT SERPL-MCNC: 0.58 MG/DL — SIGNIFICANT CHANGE UP (ref 0.5–1.3)
DIFF PNL FLD: NEGATIVE — SIGNIFICANT CHANGE UP
EPI CELLS # UR: ABNORMAL /HPF (ref 0–5)
ESTIMATED AVERAGE GLUCOSE: 123 MG/DL — HIGH (ref 68–114)
GLUCOSE BLDC GLUCOMTR-MCNC: 141 MG/DL — HIGH (ref 70–99)
GLUCOSE BLDC GLUCOMTR-MCNC: 164 MG/DL — HIGH (ref 70–99)
GLUCOSE SERPL-MCNC: 157 MG/DL — HIGH (ref 70–99)
GLUCOSE UR QL: NEGATIVE — SIGNIFICANT CHANGE UP
HCT VFR BLD CALC: 31.3 % — LOW (ref 34.5–45)
HGB BLD-MCNC: 10.2 G/DL — LOW (ref 11.5–15.5)
KETONES UR-MCNC: NEGATIVE — SIGNIFICANT CHANGE UP
LEUKOCYTE ESTERASE UR-ACNC: ABNORMAL
MAGNESIUM SERPL-MCNC: 1.9 MG/DL — SIGNIFICANT CHANGE UP (ref 1.6–2.6)
MCHC RBC-ENTMCNC: 29.3 PG — SIGNIFICANT CHANGE UP (ref 27–34)
MCHC RBC-ENTMCNC: 32.6 GM/DL — SIGNIFICANT CHANGE UP (ref 32–36)
MCV RBC AUTO: 89.9 FL — SIGNIFICANT CHANGE UP (ref 80–100)
NITRITE UR-MCNC: NEGATIVE — SIGNIFICANT CHANGE UP
NRBC # BLD: 0 /100 WBCS — SIGNIFICANT CHANGE UP (ref 0–0)
PH UR: 7.5 — SIGNIFICANT CHANGE UP (ref 5–8)
PHOSPHATE SERPL-MCNC: 2.6 MG/DL — SIGNIFICANT CHANGE UP (ref 2.5–4.5)
PLATELET # BLD AUTO: 175 K/UL — SIGNIFICANT CHANGE UP (ref 150–400)
POTASSIUM SERPL-MCNC: 4 MMOL/L — SIGNIFICANT CHANGE UP (ref 3.5–5.3)
POTASSIUM SERPL-SCNC: 4 MMOL/L — SIGNIFICANT CHANGE UP (ref 3.5–5.3)
PROT UR-MCNC: NEGATIVE MG/DL — SIGNIFICANT CHANGE UP
RBC # BLD: 3.48 M/UL — LOW (ref 3.8–5.2)
RBC # FLD: 14.1 % — SIGNIFICANT CHANGE UP (ref 10.3–14.5)
RBC CASTS # UR COMP ASSIST: < 5 /HPF — SIGNIFICANT CHANGE UP
SODIUM SERPL-SCNC: 142 MMOL/L — SIGNIFICANT CHANGE UP (ref 135–145)
SP GR SPEC: 1.01 — SIGNIFICANT CHANGE UP (ref 1–1.03)
UROBILINOGEN FLD QL: 0.2 E.U./DL — SIGNIFICANT CHANGE UP
WBC # BLD: 5.52 K/UL — SIGNIFICANT CHANGE UP (ref 3.8–10.5)
WBC # FLD AUTO: 5.52 K/UL — SIGNIFICANT CHANGE UP (ref 3.8–10.5)
WBC UR QL: ABNORMAL /HPF

## 2022-01-20 PROCEDURE — 99233 SBSQ HOSP IP/OBS HIGH 50: CPT

## 2022-01-20 PROCEDURE — 82962 GLUCOSE BLOOD TEST: CPT

## 2022-01-20 PROCEDURE — C1760: CPT

## 2022-01-20 PROCEDURE — 99222 1ST HOSP IP/OBS MODERATE 55: CPT

## 2022-01-20 PROCEDURE — 81001 URINALYSIS AUTO W/SCOPE: CPT

## 2022-01-20 PROCEDURE — 36415 COLL VENOUS BLD VENIPUNCTURE: CPT

## 2022-01-20 PROCEDURE — 85730 THROMBOPLASTIN TIME PARTIAL: CPT

## 2022-01-20 PROCEDURE — 80048 BASIC METABOLIC PNL TOTAL CA: CPT

## 2022-01-20 PROCEDURE — 83036 HEMOGLOBIN GLYCOSYLATED A1C: CPT

## 2022-01-20 PROCEDURE — 83735 ASSAY OF MAGNESIUM: CPT

## 2022-01-20 PROCEDURE — C1887: CPT

## 2022-01-20 PROCEDURE — 74018 RADEX ABDOMEN 1 VIEW: CPT

## 2022-01-20 PROCEDURE — 85027 COMPLETE CBC AUTOMATED: CPT

## 2022-01-20 PROCEDURE — C1874: CPT

## 2022-01-20 PROCEDURE — 85610 PROTHROMBIN TIME: CPT

## 2022-01-20 PROCEDURE — C1769: CPT

## 2022-01-20 PROCEDURE — C1889: CPT

## 2022-01-20 PROCEDURE — C1725: CPT

## 2022-01-20 PROCEDURE — 84100 ASSAY OF PHOSPHORUS: CPT

## 2022-01-20 RX ORDER — CEFPODOXIME PROXETIL 100 MG
200 TABLET ORAL EVERY 12 HOURS
Refills: 0 | Status: DISCONTINUED | OUTPATIENT
Start: 2022-01-20 | End: 2022-01-20

## 2022-01-20 RX ORDER — CEFPODOXIME PROXETIL 100 MG
1 TABLET ORAL
Qty: 10 | Refills: 0
Start: 2022-01-20 | End: 2022-01-24

## 2022-01-20 RX ORDER — ACETAMINOPHEN 500 MG
2 TABLET ORAL
Qty: 0 | Refills: 0 | DISCHARGE
Start: 2022-01-20

## 2022-01-20 RX ORDER — ASPIRIN/CALCIUM CARB/MAGNESIUM 324 MG
1 TABLET ORAL
Qty: 0 | Refills: 0 | DISCHARGE
Start: 2022-01-20

## 2022-01-20 RX ORDER — POTASSIUM PHOSPHATE, MONOBASIC POTASSIUM PHOSPHATE, DIBASIC 236; 224 MG/ML; MG/ML
30 INJECTION, SOLUTION INTRAVENOUS ONCE
Refills: 0 | Status: COMPLETED | OUTPATIENT
Start: 2022-01-20 | End: 2022-01-20

## 2022-01-20 RX ADMIN — Medication 325 MILLIGRAM(S): at 12:33

## 2022-01-20 RX ADMIN — Medication 650 MILLIGRAM(S): at 06:06

## 2022-01-20 RX ADMIN — Medication 650 MILLIGRAM(S): at 06:58

## 2022-01-20 RX ADMIN — Medication 1 TABLET(S): at 12:58

## 2022-01-20 RX ADMIN — ATENOLOL 50 MILLIGRAM(S): 25 TABLET ORAL at 06:06

## 2022-01-20 RX ADMIN — Medication 2: at 12:22

## 2022-01-20 RX ADMIN — POTASSIUM PHOSPHATE, MONOBASIC POTASSIUM PHOSPHATE, DIBASIC 83.33 MILLIMOLE(S): 236; 224 INJECTION, SOLUTION INTRAVENOUS at 09:14

## 2022-01-20 RX ADMIN — PANTOPRAZOLE SODIUM 40 MILLIGRAM(S): 20 TABLET, DELAYED RELEASE ORAL at 06:05

## 2022-01-20 RX ADMIN — AMLODIPINE BESYLATE 10 MILLIGRAM(S): 2.5 TABLET ORAL at 06:06

## 2022-01-20 NOTE — DISCHARGE NOTE NURSING/CASE MANAGEMENT/SOCIAL WORK - NSDCPEFALRISK_GEN_ALL_CORE
For information on Fall & Injury Prevention, visit: [email or web address] OR  [email or web address] OR  https://www.cdc.gov/steadi/patient.html

## 2022-01-20 NOTE — DISCHARGE NOTE PROVIDER - HOSPITAL COURSE
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 Patient had outpatient workup for chronic neck and head pain. 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 On 8/2021 she was treated for right PCOMM aneurysm with a flow diverting stent placement. Denies visual changes, n/v/, weakness, gait imbalance. On Aspirin 325mg daily and Plavix 75mg every other day. S/p  flow diverting stent for left PCOMM aneurysm (1/19/22)

## 2022-01-20 NOTE — PACU DISCHARGE NOTE - COMMENTS
For discharge today . Patient awake, A/O X4, no neurological deficit. Able to tolerate  solid and fluids , Had breakfast 90% consumed. Right groin dsg. no swelling no bleeding, pulses palpable. Crowley cath. removed. Awaiting  to be able to urinate prior to D/C. For discharge today . Patient awake, A/O X4, no neurological deficit. Able to tolerate  solid and fluids , Had breakfast 90% consumed. Right groin dsg. no swelling no bleeding, pulses palpable. Crowley cath. removed. Awaiting  to be able to urinate prior to D/C.  1400. voided 300cc. d/c instruction reviewed .

## 2022-01-20 NOTE — DISCHARGE NOTE PROVIDER - REASON FOR ADMISSION
Cerebral angiogram with flow diverting stent placed at left pcomm aneurysm Cerebral angiogram with coil embolization

## 2022-01-20 NOTE — DISCHARGE NOTE PROVIDER - NSDCCPCAREPLAN_GEN_ALL_CORE_FT
PRINCIPAL DISCHARGE DIAGNOSIS  Diagnosis: History of cerebral aneurysm  Assessment and Plan of Treatment: pcomm

## 2022-01-20 NOTE — DISCHARGE NOTE PROVIDER - NSDCMRMEDTOKEN_GEN_ALL_CORE_FT
acetaminophen 325 mg oral tablet: 2 tab(s) orally every 6 hours, As needed, Temp greater or equal to 38C (100.4F), Mild Pain (1 - 3)  aspirin 325 mg oral tablet: 1 tab(s) orally once a day  atenolol 50 mg oral tablet: 1 tab(s) orally once a day  Calcium 600+D 600 mg-5 mcg (200 intl units) oral tablet: 1 tab(s) orally 3 times a day  gabapentin enacarbil 300 mg oral tablet, extended release: 1 tab(s) orally once a day  Norvasc 10 mg oral tablet: 1 tab(s) orally once a day  oxybutynin 10 mg/24 hr oral tablet, extended release: 1 tab(s) orally once a day  pantoprazole 40 mg oral delayed release tablet: 1 tab(s) orally once a day (before a meal)  Plavix 75 mg oral tablet: 75 milligram(s) orally every other day   acetaminophen 325 mg oral tablet: 2 tab(s) orally every 6 hours, As needed, Temp greater or equal to 38C (100.4F), Mild Pain (1 - 3)  aspirin 325 mg oral tablet: 1 tab(s) orally once a day  atenolol 50 mg oral tablet: 1 tab(s) orally once a day  Calcium 600+D 600 mg-5 mcg (200 intl units) oral tablet: 1 tab(s) orally 3 times a day  cefpodoxime 200 mg oral tablet: 1 tab(s) orally 2 times a day for UTI  gabapentin enacarbil 300 mg oral tablet, extended release: 1 tab(s) orally once a day  Norvasc 10 mg oral tablet: 1 tab(s) orally once a day  oxybutynin 10 mg/24 hr oral tablet, extended release: 1 tab(s) orally once a day  pantoprazole 40 mg oral delayed release tablet: 1 tab(s) orally once a day (before a meal)  Plavix 75 mg oral tablet: 75 milligram(s) orally every other day

## 2022-01-20 NOTE — DISCHARGE NOTE PROVIDER - CARE PROVIDER_API CALL
Alden Jc; MPH; MS)  Neurosurgery Surgery  [address], [address]  [place] [address]  Phone: [phone]  Fax: [phone]  Follow Up Time:

## 2022-01-20 NOTE — PROGRESS NOTE ADULT - ASSESSMENT
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 Patient had outpatient workup for chronic neck and head pain. 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 On 8/2021 she was treated for right PCOMM aneurysm with a flow diverting stent placemen.  S/P  flow diverting stent for left PCOMM aneursym (1/19/22)    Neuro  Neuro and vital checks q1h  -pain control as needed    Cardio  -normotensive  -cont home norvasc    Pulm  -on RA    GI  -regular diet    Renal  -replete electrolytes as needed    Heme  -f/u post op CBC  - daily and Plavix 75 every other day (next dose Friday 1/21/22)    Endo  -no active issues    ID  -afebrile    MISC:    SOCIAL/FAMILY:  [] awaiting [] updated at bedside [] family meeting    CODE STATUS:  [] Full Code [] DNR [] DNI [] Palliative/Comfort Care    DISPOSITION:  [] ICU [] Stroke Unit [] Floor [] EMU [] RCU [] PCU    [] Patient is at high risk of neurologic deterioration/death due to:       Time spent: ___ [] critical care minutes     ASSESSMENT:  70 y/o female with 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 Patient had outpatient workup for chronic neck and head pain. 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 On 8/2021 she was treated for right PCOMM aneurysm with a flow diverting stent placemen.  S/P  flow diverting stent for left PCOMM aneursym (1/19/22)    Neuro  Neurochecks Q4  Pain control as needed  PT/OT    Cardio  Normotensive  Cont home norvasc    Pulm  Stable on RA    GI  REgular diet    Renal  Replete electrolytes as needed  No Crowley    Heme  Hb stable   daily and Plavix 75 every other day (next dose Friday 1/21/22)    Endo  No active issues  A1c 5.9    ID  Afebrile  LUTS. Likely UTI- partially treated.   Start cefpodoxime 200 bid for 5 days  Follow up with PCP    MISC:    SOCIAL/FAMILY:  [x] awaiting [] updated at bedside [] family meeting    CODE STATUS:  [x] Full Code [] DNR [] DNI [] Palliative/Comfort Care    DISPOSITION:  [x] ICU [] Stroke Unit [] Floor [] EMU [] RCU [] PCU    Not critically ill.     Stable for discharge home.     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 Patient had outpatient workup for chronic neck and head pain. 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 On 8/2021 she was treated for right PCOMM aneurysm with a flow diverting stent placemen.  S/P  flow diverting stent for left PCOMM aneursym (1/19/22)    Neuro  Neurochecks Q4  Pain control as needed  PT/OT    CARDIO:  Normotensive  Cont home norvasc    PULM:   Stable on RA    GI  Regular diet    RENAL:  Replete electrolytes as needed  No Crowley    HEME:   Hb stable   daily and Plavix 75 every other day (next dose Friday 1/21/22)  Hold lovenox. Ambulate.     Endo  No active issues  A1c 5.9    ID  Afebrile  LUTS. Likely UTI- partially treated.   Start cefpodoxime 200 bid for 5 days  Follow up with PCP    MISC:    SOCIAL/FAMILY:  [x] awaiting [] updated at bedside [] family meeting    CODE STATUS:  [x] Full Code [] DNR [] DNI [] Palliative/Comfort Care    DISPOSITION:  [] ICU [] Stroke Unit [] Floor [] EMU [] RCU [] PCU Other: Home    Not critically ill.     Stable for discharge home.

## 2022-01-20 NOTE — PROGRESS NOTE ADULT - SUBJECTIVE AND OBJECTIVE BOX
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 Patient had outpatient workup for chronic neck and head pain. 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 On 8/2021 she was treated for right PCOMM aneurysm with a flow diverting stent placement.   Denies visual changes, n/v/, weakness, gait imbalance.   On Aspirin 325mg daily and Plavix 75mg every other day.  (19 Jan 2022 08:23)    OVERNIGHT EVENTS: [facility]    Hospital Course:   1/19: s/p flow diverting stent for left PCOMM aneurysm   1/20: POD1. TONI o/n neuro stable.   Vital Signs Last 24 Hrs  T(C): 36.7 (20 Jan 2022 01:00), Max: 36.7 (20 Jan 2022 01:00)  T(F): 98 (20 Jan 2022 01:00), Max: 98 (20 Jan 2022 01:00)  HR: 87 (20 Jan 2022 02:00) (81 - 93)  BP: 97/53 (20 Jan 2022 02:00) (97/53 - 143/65)  BP(mean): 70 (20 Jan 2022 02:00) (70 - 94)  RR: 15 (20 Jan 2022 02:00) (11 - 25)  SpO2: 98% (20 Jan 2022 02:00) (93% - 99%)    I&O's Summary    19 Jan 2022 07:01  -  20 Jan 2022 03:06  --------------------------------------------------------  IN: 1075 mL / OUT: 1445 mL / NET: -370 mL        PHYSICAL EXAM:  GEN: laying in bed, appears well, NAD  NEURO: AOx3. FC, OE spont, speech intact, face symmetric. CNII-XII intact. PERRL, EOMI. No pronator drift. MAEx4. 5/5 strength throughout. SILT  CV: RRR +S1/S2  PULM: CTAB  GI: Abd soft, NT/ND  EXT: ext warm, dry, nontender. DP/PT 2+ b/l  WOUND: R groin site w/ saturated dressing. groin site soft, nontender.    TUBES/LINES:  [] Crowley  [] Lumbar Drain  [] Wound Drains  [] Others      DIET:  [] NPO  [x] Mechanical  [] Tube feeds    LABS:                        10.2   6.92  )-----------( 177      ( 19 Jan 2022 17:20 )             30.9     01-19    140  |  109<H>  |  12  ----------------------------<  106<H>  3.5   |  22  |  0.60    Ca    7.9<L>      19 Jan 2022 17:20  Phos  3.6     01-19  Mg     1.6     01-19      PT/INR - ( 19 Jan 2022 17:20 )   PT: 12.9 sec;   INR: 1.08          PTT - ( 19 Jan 2022 17:20 )  PTT:108.9 sec        CAPILLARY BLOOD GLUCOSE      POCT Blood Glucose.: 157 mg/dL (19 Jan 2022 22:07)  POCT Blood Glucose.: 102 mg/dL (19 Jan 2022 16:51)      Drug Levels: [] N/A    CSF Analysis: [] N/A      Allergies    No Known Allergies    Intolerances      MEDICATIONS:  Antibiotics:    Neuro:  acetaminophen     Tablet .. 650 milliGRAM(s) Oral every 6 hours PRN  aspirin 325 milliGRAM(s) Oral daily    Anticoagulation:    OTHER:  amLODIPine   Tablet 10 milliGRAM(s) Oral daily  ATENolol  Tablet 50 milliGRAM(s) Oral daily  chlorhexidine 4% Liquid 1 Application(s) Topical once  dextrose 40% Gel 15 Gram(s) Oral once  dextrose 50% Injectable 25 Gram(s) IV Push once  dextrose 50% Injectable 12.5 Gram(s) IV Push once  dextrose 50% Injectable 25 Gram(s) IV Push once  glucagon  Injectable 1 milliGRAM(s) IntraMuscular once  insulin lispro (ADMELOG) corrective regimen sliding scale   SubCutaneous three times a day before meals  insulin lispro (ADMELOG) corrective regimen sliding scale   SubCutaneous at bedtime  pantoprazole    Tablet 40 milliGRAM(s) Oral before breakfast    IVF:  calcium carbonate 1250 mG  + Vitamin D (OsCal 500 + D) 1 Tablet(s) Oral daily  dextrose 5%. 1000 milliLiter(s) IV Continuous <Continuous>  dextrose 5%. 1000 milliLiter(s) IV Continuous <Continuous>  sodium chloride 0.9%. 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 Patient had outpatient workup for chronic neck and head pain. 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 On 8/2021 she was treated for right PCOMM aneurysm with a flow diverting stent placement. Denies visual changes, n/v/, weakness, gait imbalance. On Aspirin 325mg daily and Plavix 75mg every other day. S/p  flow diverting stent for left PCOMM aneursym (1/19/22)    [id number]    XI67.1    No pertinent family history in first degree relatives    Handoff    No pertinent past medical history    No pertinent past medical history    HTN (hypertension)    GERD (gastroesophageal reflux disease)    Anxiety    Osteoporosis    Overactive bladder    History of cerebral aneurysm    Cerebral aneurysm    Cerebral aneurysm    History of cerebral aneurysm    HTN (hypertension)    GERD (gastroesophageal reflux disease)    Osteoporosis    Overactive bladder    Anxiety    Angiogram, cerebral, with stent insertion    History of cholecystectomy    H/O shoulder surgery    S/P cerebral aneurysm operation    [id number]        PLAN:  Neuro  -neuro and vital checks q1h  -pain control as needed    Cardio  -normotensive  -cont home norvasc    Pulm  -on RA    GI  -regular diet    Renal  -replete electrolytes as needed    Heme  -f/u post op CBC  - daily and Plavix 75 every other day (next dose Friday 1/21/22)    Endo  -no active issues    ID  -afebrile    ICU status. family updated  d/w Dr. Jc       Assessment:  Present when checked    []  GCS  E   V  M     Heart Failure: []Acute, [] acute on chronic , []chronic  Heart Failure:  [] Diastolic (HFpEF), [] Systolic (HFrEF), []Combined (HFpEF and HFrEF), [] RHF, [] Pulm HTN, [] Other    [] TOM, [] ATN, [] AIN, [] other  [] CKD1, [] CKD2, [] CKD 3, [] CKD 4, [] CKD 5, []ESRD    Encephalopathy: [] Metabolic, [] Hepatic, [] toxic, [] Neurological, [] Other    Abnormal Nurtitional Status: [] malnurtition (see nutrition note), [ ]underweight: BMI < 19, [] morbid obesity: BMI >40, [] Cachexia    [] Sepsis  [] hypovolemic shock,[] cardiogenic shock, [] hemorrhagic shock, [] neuogenic shock  [] Acute Respiratory Failure  []Cerebral edema, [] Brain compression/ herniation,   [] Functional quadriplegia  [] Acute blood loss anemia

## 2022-01-20 NOTE — DISCHARGE NOTE NURSING/CASE MANAGEMENT/SOCIAL WORK - PATIENT PORTAL LINK FT
You can access the FollowMyHealth Patient Portal offered by [facility] by registering at the following website: [email or web address]. By joining [facility]’s FollowMyHealth portal, you will also be able to view your health information using other applications (apps) compatible with our system.

## 2022-01-20 NOTE — DISCHARGE NOTE PROVIDER - NSDCFUADDINST_GEN_ALL_CORE_FT
ACTIVITY:     Do not drive or operate hazardous machinery for 24 hours.                        Limit your physical activities for 24 hours.                        Do not engage in sports, heavy work or heavy lifting for 72 hours.    DIET:             You may resume your regular diet.                        Drinking extra fluids (water, juice) is encouraged.                        Abstain from alcohol for 24 hours.    HYGIENE:     Shower normally. You may have steri strip overlying your groin incision. Please do not pull to remove as these strips will fall off on their own in the next 3-5 days.                      Do not take a bath, go in a hot tub or swim for 5 days.    PAIN MEDICATION:   A mild pain at the puncture site is not unusual.                                        You may take Tylenol 1-2 tabs every 4-6 hours as needed, for one day.                                        If the pain persists contact the office at [phone]    SPECIAL INSTRUCTIONS:  Signs and symptoms to look out for:       1. Jose bleeding from the puncture site is an emergency.            Put direct pressure on the site and go directly to your local Emergency   Room for treatment.       2. Bleeding under the skin may also occur and a small "black and blue may be expected.         If there appears to be an expanding mass or swelling around the puncture site, apply manual compression and go          immediately to your local Emergency Room for treatment.       3. If your foot/leg or hand/arm (puncture site) becomes cool or blue and/or you are unable to move it, this must be treated as an   emergency.         go directly to your local emergency room for treatment.       4. Excessive puncture site pain is abnormal and should be assessed.      5.  Look out for signs of infection in the puncture site: fever, red streaking of the leg, discharge, pain.      6.  Lack of adequate urine output, provided you are drinking enough fluids, may be cause for concern, notify us if you think this is the case.                         ACTIVITY:     Do not drive or operate hazardous machinery for 24 hours.                        Limit your physical activities for 24 hours.                        Do not engage in sports, heavy work or heavy lifting for 72 hours.    You were prescribed cefpodoxime for 5 days for Urinary Tract Infection. Please seek medical attention if you have worsening suprapubic pain or urinary symptoms.    DIET:             You may resume your regular diet.                        Drinking extra fluids (water, juice) is encouraged.                        Abstain from alcohol for 24 hours.    HYGIENE:     Shower normally. You may have steri strip overlying your groin incision. Please do not pull to remove as these strips will fall off on their own in the next 3-5 days.                      Do not take a bath, go in a hot tub or swim for 5 days.    PAIN MEDICATION:   A mild pain at the puncture site is not unusual.                                        You may take Tylenol 1-2 tabs every 4-6 hours as needed, for one day.                                        If the pain persists contact the office at [phone]    SPECIAL INSTRUCTIONS:  Signs and symptoms to look out for:       1. Jose bleeding from the puncture site is an emergency.            Put direct pressure on the site and go directly to your local Emergency   Room for treatment.       2. Bleeding under the skin may also occur and a small "black and blue may be expected.         If there appears to be an expanding mass or swelling around the puncture site, apply manual compression and go          immediately to your local Emergency Room for treatment.       3. If your foot/leg or hand/arm (puncture site) becomes cool or blue and/or you are unable to move it, this must be treated as an   emergency.         go directly to your local emergency room for treatment.       4. Excessive puncture site pain is abnormal and should be assessed.      5.  Look out for signs of infection in the puncture site: fever, red streaking of the leg, discharge, pain.      6.  Lack of adequate urine output, provided you are drinking enough fluids, may be cause for concern, notify us if you think this is the case.

## 2022-01-20 NOTE — DISCHARGE NOTE PROVIDER - NSDCCPTREATMENT_GEN_ALL_CORE_FT
PRINCIPAL PROCEDURE  Procedure: Angiogram, cerebral, with stent insertion  Findings and Treatment: left pcomm

## 2022-01-20 NOTE — PROGRESS NOTE ADULT - REASON FOR ADMISSION
Cerebral angiogram with coil embolization

## 2022-01-20 NOTE — PROGRESS NOTE ADULT - SUBJECTIVE AND OBJECTIVE BOX
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 Patient had outpatient workup for chronic neck and head pain. 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 On 8/2021 she was treated for right PCOMM aneurysm with a flow diverting stent placement.   Denies visual changes, n/v/, weakness, gait imbalance.   On Aspirin 325mg daily and Plavix 75mg every other day.  (19 Jan 2022 08:23)        Hospital Course:   1/19: s/p flow diverting stent for left PCOMM aneurysm   1/20: POD1. TONI o/n neuro stable.     PMH/PSH:  HTN (hypertension)  GERD (gastroesophageal reflux disease)  Anxiety  Osteoporosis  Overactive bladder  History of cerebral aneurysm  Cerebral aneurysm  HTN (hypertension)  Angiogram, cerebral, with stent insertion  History of cholecystectomy  H/O shoulder surgery    ROS:    ICU Vital Signs Last 24 Hrs  T(C): 36.8 (20 Jan 2022 06:00), Max: 36.8 (20 Jan 2022 06:00)  T(F): 98.2 (20 Jan 2022 06:00), Max: 98.2 (20 Jan 2022 06:00)  HR: 81 (20 Jan 2022 07:03) (81 - 93)  BP: 100/55 (20 Jan 2022 07:03) (97/53 - 143/65)  BP(mean): 74 (20 Jan 2022 07:03) (70 - 94)  ABP: 122/52 (20 Jan 2022 07:03) (103/48 - 167/57)  ABP(mean): 75 (20 Jan 2022 07:03) (64 - 99)  RR: 19 (20 Jan 2022 07:03) (11 - 25)  SpO2: 98% (20 Jan 2022 07:03) (93% - 99%)      01-19-22 @ 07:01  -  01-20-22 @ 07:00  --------------------------------------------------------  IN: 1550 mL / OUT: 1880 mL / NET: -330 mL      PHYSICAL EXAM:  GEN: Patient in no acute distress  NEURO: AOx3. FC, speech intact, face symmetric. CNII-XII intact. PERRL, EOMI.  Moving all extremities with 5/5 strength throughout and no drift.  Sensation intact.  CV: RRR +S1/S2  PULM: CTAB  GI: Abd soft, NT/ND  EXT: Ext warm, dry, nontender. DP/PT 2+ b/l  WOUND: R groin site w/ saturated dressing. groin site soft, nontender      MEDICATIONS  acetaminophen     Tablet .. 650 milliGRAM(s) Oral every 6 hours PRN  amLODIPine   Tablet 10 milliGRAM(s) Oral daily  aspirin 325 milliGRAM(s) Oral daily  ATENolol  Tablet 50 milliGRAM(s) Oral daily  calcium carbonate 1250 mG  + Vitamin D (OsCal 500 + D) 1 Tablet(s) Oral daily  chlorhexidine 4% Liquid 1 Application(s) Topical once  dextrose 40% Gel 15 Gram(s) Oral once  dextrose 5%. 1000 milliLiter(s) (50 mL/Hr) IV Continuous <Continuous>  dextrose 5%. 1000 milliLiter(s) (100 mL/Hr) IV Continuous <Continuous>  dextrose 50% Injectable 25 Gram(s) IV Push once  dextrose 50% Injectable 12.5 Gram(s) IV Push once  dextrose 50% Injectable 25 Gram(s) IV Push once  glucagon  Injectable 1 milliGRAM(s) IntraMuscular once  insulin lispro (ADMELOG) corrective regimen sliding scale   SubCutaneous three times a day before meals  insulin lispro (ADMELOG) corrective regimen sliding scale   SubCutaneous at bedtime  pantoprazole    Tablet 40 milliGRAM(s) Oral before breakfast  potassium phosphate IVPB 30 milliMole(s) IV Intermittent once  sodium chloride 0.9%. 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 Patient had outpatient workup for chronic neck and head pain. 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 On 8/2021 she was treated for right PCOMM aneurysm with a flow diverting stent placement.   Denies visual changes, n/v/, weakness, gait imbalance.   On Aspirin 325mg daily and Plavix 75mg every other day.  (19 Jan 2022 08:23)    Hospital Course:   1/19: s/p flow diverting stent for left PCOMM aneurysm   1/20: POD1. TONI o/n neuro stable.     PMH/PSH:  HTN (hypertension)  GERD (gastroesophageal reflux disease)  Anxiety  Osteoporosis  Overactive bladder  History of cerebral aneurysm  Cerebral aneurysm  HTN (hypertension)  Angiogram, cerebral, with stent insertion  History of cholecystectomy  H/O shoulder surgery    ROS: Complains of lower abdominal pain/ discomfort    ICU Vital Signs Last 24 Hrs  T(C): 36.8 (20 Jan 2022 06:00), Max: 36.8 (20 Jan 2022 06:00)  T(F): 98.2 (20 Jan 2022 06:00), Max: 98.2 (20 Jan 2022 06:00)  HR: 81 (20 Jan 2022 07:03) (81 - 93)  BP: 100/55 (20 Jan 2022 07:03) (97/53 - 143/65)  BP(mean): 74 (20 Jan 2022 07:03) (70 - 94)  ABP: 122/52 (20 Jan 2022 07:03) (103/48 - 167/57)  ABP(mean): 75 (20 Jan 2022 07:03) (64 - 99)  RR: 19 (20 Jan 2022 07:03) (11 - 25)  SpO2: 98% (20 Jan 2022 07:03) (93% - 99%)      01-19-22 @ 07:01  -  01-20-22 @ 07:00  --------------------------------------------------------  IN: 1550 mL / OUT: 1880 mL / NET: -330 mL      PHYSICAL EXAM:  GEN: Patient in no acute distress  NEURO: AOx3, following commands, speech intact, face symmetric. CNII-XII intact. PERRL, EOMI. Moving all extremities with 5/5 strength throughout and no drift.  Sensation intact.  CV: RRR +S1/S2  PULM: CTAB  GI: Abd soft, NT/ND  EXT: Ext warm, dry, nontender. DP/PT 2+ B/L.  WOUND: R groin site with some serosanguinous staining, no active oozing.  No hematoma.       MEDICATIONS  acetaminophen     Tablet .. 650 milliGRAM(s) Oral every 6 hours PRN  amLODIPine   Tablet 10 milliGRAM(s) Oral daily  aspirin 325 milliGRAM(s) Oral daily  ATENolol  Tablet 50 milliGRAM(s) Oral daily  calcium carbonate 1250 mG  + Vitamin D (OsCal 500 + D) 1 Tablet(s) Oral daily  chlorhexidine 4% Liquid 1 Application(s) Topical once  dextrose 40% Gel 15 Gram(s) Oral once  dextrose 5%. 1000 milliLiter(s) (50 mL/Hr) IV Continuous <Continuous>  dextrose 5%. 1000 milliLiter(s) (100 mL/Hr) IV Continuous <Continuous>  dextrose 50% Injectable 25 Gram(s) IV Push once  dextrose 50% Injectable 12.5 Gram(s) IV Push once  dextrose 50% Injectable 25 Gram(s) IV Push once  glucagon  Injectable 1 milliGRAM(s) IntraMuscular once  insulin lispro (ADMELOG) corrective regimen sliding scale   SubCutaneous three times a day before meals  insulin lispro (ADMELOG) corrective regimen sliding scale   SubCutaneous at bedtime  pantoprazole    Tablet 40 milliGRAM(s) Oral before breakfast  potassium phosphate IVPB 30 milliMole(s) IV Intermittent once  sodium chloride 0.9%. 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 Patient had outpatient workup for chronic neck and head pain. 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 On 8/2021 she was treated for right PCOMM aneurysm with a flow diverting stent placement.   Denies visual changes, n/v/, weakness, gait imbalance.   On Aspirin 325mg daily and Plavix 75mg every other day.  (19 Jan 2022 08:23)    Hospital Course:   1/19: s/p flow diverting stent for left PCOMM aneurysm   1/20: POD1. No acute events o/n. Stable neuro exam.     PMH/PSH:  HTN (hypertension)  GERD (gastroesophageal reflux disease)  Anxiety  Osteoporosis  Overactive bladder  History of cerebral aneurysm  Cerebral aneurysm  HTN (hypertension)  Angiogram, cerebral, with stent insertion  History of cholecystectomy  H/O shoulder surgery    ROS: Complains of lower abdominal pain/ discomfort    ICU Vital Signs Last 24 Hrs  T(C): 36.8 (20 Jan 2022 06:00), Max: 36.8 (20 Jan 2022 06:00)  T(F): 98.2 (20 Jan 2022 06:00), Max: 98.2 (20 Jan 2022 06:00)  HR: 81 (20 Jan 2022 07:03) (81 - 93)  BP: 100/55 (20 Jan 2022 07:03) (97/53 - 143/65)  BP(mean): 74 (20 Jan 2022 07:03) (70 - 94)  ABP: 122/52 (20 Jan 2022 07:03) (103/48 - 167/57)  ABP(mean): 75 (20 Jan 2022 07:03) (64 - 99)  RR: 19 (20 Jan 2022 07:03) (11 - 25)  SpO2: 98% (20 Jan 2022 07:03) (93% - 99%)      01-19-22 @ 07:01  -  01-20-22 @ 07:00  --------------------------------------------------------  IN: 1550 mL / OUT: 1880 mL / NET: -330 mL      PHYSICAL EXAM:  GEN: Patient in no acute distress  NEURO: AOx3, following commands, speech intact, face symmetric. CNII-XII intact. PERRL, EOMI. Moving all extremities with 5/5 strength throughout and no drift.  Sensation intact.  CV: RRR +S1/S2  PULM: CTAB  GI: Abd soft, NT/ND  EXT: Ext warm, dry, nontender. DP/PT 2+ B/L.  WOUND: R groin site with some serosanguinous staining, no active oozing.  No hematoma.       MEDICATIONS  acetaminophen     Tablet .. 650 milliGRAM(s) Oral every 6 hours PRN  amLODIPine   Tablet 10 milliGRAM(s) Oral daily  aspirin 325 milliGRAM(s) Oral daily  ATENolol  Tablet 50 milliGRAM(s) Oral daily  calcium carbonate 1250 mG  + Vitamin D (OsCal 500 + D) 1 Tablet(s) Oral daily  chlorhexidine 4% Liquid 1 Application(s) Topical once  dextrose 40% Gel 15 Gram(s) Oral once  dextrose 5%. 1000 milliLiter(s) (50 mL/Hr) IV Continuous <Continuous>  dextrose 5%. 1000 milliLiter(s) (100 mL/Hr) IV Continuous <Continuous>  dextrose 50% Injectable 25 Gram(s) IV Push once  dextrose 50% Injectable 12.5 Gram(s) IV Push once  dextrose 50% Injectable 25 Gram(s) IV Push once  glucagon  Injectable 1 milliGRAM(s) IntraMuscular once  insulin lispro (ADMELOG) corrective regimen sliding scale   SubCutaneous three times a day before meals  insulin lispro (ADMELOG) corrective regimen sliding scale   SubCutaneous at bedtime  pantoprazole    Tablet 40 milliGRAM(s) Oral before breakfast  potassium phosphate IVPB 30 milliMole(s) IV Intermittent once  sodium chloride 0.9%. 1000 milliLiter(s) (75 mL/Hr) IV Continuous <Continuous>    LABS:                    10.2   5.52  )-----------( 175      ( 20 Jan 2022 04:31 )             31.3     01-20    142  |  108  |  11  ----------------------------<  157<H>  4.0   |  23  |  0.58    Ca    8.1<L>      20 Jan 2022 04:31  Phos  2.6     01-20  Mg     1.9     01-20        TUBES/LINES:  [] Crowley  [] Lumbar Drain  [] Wound Drains  [] Others

## 2022-01-21 ENCOUNTER — NON-APPOINTMENT (OUTPATIENT)
Age: 72
End: 2022-01-21

## 2022-01-24 ENCOUNTER — NON-APPOINTMENT (OUTPATIENT)
Age: 72
End: 2022-01-24

## 2022-03-31 NOTE — H&P ADULT - PROBLEM SELECTOR PLAN 6
On alprazolam 0.25mg daily PRN Detail Level: Simple Instructions: This plan will send the code FBSD to the PM system.  DO NOT or CHANGE the price. Price (Do Not Change): 0.00

## 2022-05-27 PROBLEM — Z86.79 PERSONAL HISTORY OF OTHER DISEASES OF THE CIRCULATORY SYSTEM: Chronic | Status: ACTIVE | Noted: 2022-01-19

## 2022-06-06 ENCOUNTER — APPOINTMENT (OUTPATIENT)
Dept: INTERNAL MEDICINE | Facility: CLINIC | Age: 72
End: 2022-06-06

## 2022-06-14 NOTE — HISTORY OF PRESENT ILLNESS
[Family Member] : family member [Consult Letter:] : I had the pleasure of evaluating your patient, [unfilled]. [FreeTextEntry1] : rash\par presurgical clearance [Consult Closing:] : Thank you very much for allowing me to participate in the care of this patient.  If you have any questions, please do not hesitate to contact me. [de-identified] : rash on chest and upper back for 3-4 days\par was given sulfa drug about same time by gyn\par pt and dtg state pt needs presurgical testing for left carotid artery aneurhysm 1/19/22  surgery-Dr Sanchez--[facility]

## 2022-08-30 ENCOUNTER — RX RENEWAL (OUTPATIENT)
Age: 72
End: 2022-08-30

## 2022-11-01 ENCOUNTER — APPOINTMENT (OUTPATIENT)
Dept: OBGYN | Facility: CLINIC | Age: 72
End: 2022-11-01

## 2022-11-01 VITALS
SYSTOLIC BLOOD PRESSURE: 124 MMHG | BODY MASS INDEX: 27.48 KG/M2 | HEIGHT: 66 IN | WEIGHT: 171 LBS | DIASTOLIC BLOOD PRESSURE: 71 MMHG

## 2022-11-01 DIAGNOSIS — N89.8 OTHER SPECIFIED NONINFLAMMATORY DISORDERS OF VAGINA: ICD-10-CM

## 2022-11-01 DIAGNOSIS — Z01.419 ENCOUNTER FOR GYNECOLOGICAL EXAMINATION (GENERAL) (ROUTINE) W/OUT ABNORMAL FINDINGS: ICD-10-CM

## 2022-11-01 PROCEDURE — G0101: CPT | Mod: GA

## 2022-11-01 NOTE — DISCUSSION/SUMMARY
[FreeTextEntry1] : thin prep, HPV, BV panel ordered\par rx sent for terconazole\par referral given for mammogram.\par rto in 1 year or soon for any gyn issues.\par pt verbalized understanding.\par

## 2022-11-01 NOTE — PHYSICAL EXAM
[Chaperone Declined] : Patient declined chaperone [Appropriately responsive] : appropriately responsive [Alert] : alert [No Acute Distress] : no acute distress [Soft] : soft [Non-tender] : non-tender [Non-distended] : non-distended [No Lesions] : no lesions [No Mass] : no mass [Oriented x3] : oriented x3 [Examination Of The Breasts] : a normal appearance [No Masses] : no breast masses were palpable [Labia Majora] : normal [Labia Minora] : normal [Discharge] : a  ~M vaginal discharge was present [Moderate] : moderate [White] : white [Cheesy] : cheesy [Normal] : normal [Uterine Adnexae] : normal

## 2022-11-01 NOTE — HISTORY OF PRESENT ILLNESS
[Patient reported mammogram was normal] : Patient reported mammogram was normal [Patient reported PAP Smear was normal] : Patient reported PAP Smear was normal [Patient reported bone density results were abnormal] : Patient reported bone density results were abnormal [Patient reported colonoscopy was normal] : Patient reported colonoscopy was normal [FreeTextEntry1] : 73 y/o postmenopausal female, presents for annual GYN visit c/o vaginal dryness, redness and vaginal irritation for the past few months.  Was recently on antibiotics for UTI and is following a urologist. \par  [Mammogramdate] : 2/2021 [PapSmeardate] : 2019 [BoneDensityDate] : 2/2021 [TextBox_37] : i [ColonoscopyDate] : 2016 [No] : Patient does not have concerns regarding sex [Previously active] : previously active [Patient refuses STI testing] : Patient refuses STI testing

## 2022-11-04 ENCOUNTER — NON-APPOINTMENT (OUTPATIENT)
Age: 72
End: 2022-11-04

## 2022-11-04 PROBLEM — Z01.419 WOMEN'S ANNUAL ROUTINE GYNECOLOGICAL EXAMINATION: Status: ACTIVE | Noted: 2021-01-22

## 2022-11-04 LAB
CANDIDA VAG CYTO: NOT DETECTED
G VAGINALIS+PREV SP MTYP VAG QL MICRO: NOT DETECTED
HPV HIGH+LOW RISK DNA PNL CVX: NOT DETECTED
T VAGINALIS VAG QL WET PREP: NOT DETECTED

## 2022-11-07 ENCOUNTER — APPOINTMENT (OUTPATIENT)
Dept: INTERNAL MEDICINE | Facility: CLINIC | Age: 72
End: 2022-11-07

## 2022-11-07 VITALS
SYSTOLIC BLOOD PRESSURE: 114 MMHG | BODY MASS INDEX: 27.8 KG/M2 | TEMPERATURE: 97.8 F | WEIGHT: 173 LBS | OXYGEN SATURATION: 96 % | HEART RATE: 67 BPM | DIASTOLIC BLOOD PRESSURE: 75 MMHG | HEIGHT: 66 IN

## 2022-11-07 DIAGNOSIS — Z23 ENCOUNTER FOR IMMUNIZATION: ICD-10-CM

## 2022-11-07 LAB — CYTOLOGY CVX/VAG DOC THIN PREP: ABNORMAL

## 2022-11-07 PROCEDURE — G0008: CPT

## 2022-11-07 PROCEDURE — 36415 COLL VENOUS BLD VENIPUNCTURE: CPT

## 2022-11-07 PROCEDURE — 90686 IIV4 VACC NO PRSV 0.5 ML IM: CPT

## 2022-11-07 PROCEDURE — 99213 OFFICE O/P EST LOW 20 MIN: CPT | Mod: 25

## 2022-11-07 NOTE — ASSESSMENT
[FreeTextEntry1] : add norvasc 2.5mg\par labs\par spec f/u\par cpx 6 months\par flu shot--pt defers pneumovax

## 2022-11-07 NOTE — HISTORY OF PRESENT ILLNESS
[Spouse] : spouse [FreeTextEntry1] : f/u htn\par limited historian [de-identified] : her bps at home persistently labile--gets correlative dizziness\par seeing neuro s/p carotid stent\par on atenolol 25 bid, plavix qod, asa

## 2022-11-08 LAB
ANION GAP SERPL CALC-SCNC: 12 MMOL/L
BUN SERPL-MCNC: 13 MG/DL
CALCIUM SERPL-MCNC: 9.7 MG/DL
CHLORIDE SERPL-SCNC: 100 MMOL/L
CHOLEST SERPL-MCNC: 199 MG/DL
CO2 SERPL-SCNC: 25 MMOL/L
CREAT SERPL-MCNC: 0.73 MG/DL
EGFR: 87 ML/MIN/1.73M2
ESTIMATED AVERAGE GLUCOSE: 126 MG/DL
GLUCOSE SERPL-MCNC: 98 MG/DL
HBA1C MFR BLD HPLC: 6 %
HDLC SERPL-MCNC: 61 MG/DL
LDLC SERPL CALC-MCNC: 114 MG/DL
NONHDLC SERPL-MCNC: 139 MG/DL
POTASSIUM SERPL-SCNC: 4.8 MMOL/L
SODIUM SERPL-SCNC: 138 MMOL/L
TRIGL SERPL-MCNC: 124 MG/DL

## 2022-11-10 ENCOUNTER — NON-APPOINTMENT (OUTPATIENT)
Age: 72
End: 2022-11-10

## 2022-11-10 ENCOUNTER — APPOINTMENT (OUTPATIENT)
Dept: INTERNAL MEDICINE | Facility: CLINIC | Age: 72
End: 2022-11-10

## 2023-01-16 ENCOUNTER — APPOINTMENT (OUTPATIENT)
Dept: INTERNAL MEDICINE | Facility: CLINIC | Age: 73
End: 2023-01-16
Payer: MEDICARE

## 2023-01-16 ENCOUNTER — NON-APPOINTMENT (OUTPATIENT)
Age: 73
End: 2023-01-16

## 2023-01-16 VITALS
WEIGHT: 173 LBS | HEIGHT: 66 IN | OXYGEN SATURATION: 99 % | BODY MASS INDEX: 27.8 KG/M2 | HEART RATE: 62 BPM | TEMPERATURE: 97.9 F

## 2023-01-16 VITALS — DIASTOLIC BLOOD PRESSURE: 72 MMHG | SYSTOLIC BLOOD PRESSURE: 138 MMHG

## 2023-01-16 DIAGNOSIS — I67.1 CEREBRAL ANEURYSM, NONRUPTURED: ICD-10-CM

## 2023-01-16 DIAGNOSIS — Z01.818 ENCOUNTER FOR OTHER PREPROCEDURAL EXAMINATION: ICD-10-CM

## 2023-01-16 PROCEDURE — 99214 OFFICE O/P EST MOD 30 MIN: CPT | Mod: 25

## 2023-01-16 PROCEDURE — 36415 COLL VENOUS BLD VENIPUNCTURE: CPT

## 2023-01-16 PROCEDURE — 93000 ELECTROCARDIOGRAM COMPLETE: CPT

## 2023-01-16 RX ORDER — AMLODIPINE BESYLATE 10 MG/1
10 TABLET ORAL DAILY
Qty: 30 | Refills: 5 | Status: DISCONTINUED | COMMUNITY
Start: 2020-10-05 | End: 2023-01-16

## 2023-01-17 LAB
ABO + RH PNL BLD: NORMAL
ANION GAP SERPL CALC-SCNC: 11 MMOL/L
APPEARANCE: CLEAR
APTT BLD: 31 SEC
BACTERIA: NEGATIVE
BASOPHILS # BLD AUTO: 0.05 K/UL
BASOPHILS NFR BLD AUTO: 0.8 %
BILIRUBIN URINE: NEGATIVE
BLOOD URINE: NEGATIVE
BUN SERPL-MCNC: 18 MG/DL
CALCIUM SERPL-MCNC: 9.3 MG/DL
CHLORIDE SERPL-SCNC: 104 MMOL/L
CO2 SERPL-SCNC: 26 MMOL/L
COLOR: YELLOW
CREAT SERPL-MCNC: 0.64 MG/DL
EGFR: 93 ML/MIN/1.73M2
EOSINOPHIL # BLD AUTO: 0.27 K/UL
EOSINOPHIL NFR BLD AUTO: 4.2 %
GLUCOSE QUALITATIVE U: NEGATIVE
GLUCOSE SERPL-MCNC: 109 MG/DL
HCT VFR BLD CALC: 35.7 %
HGB BLD-MCNC: 11.9 G/DL
HYALINE CASTS: 0 /LPF
IMM GRANULOCYTES NFR BLD AUTO: 0.3 %
INR PPP: 1 RATIO
KETONES URINE: NEGATIVE
LEUKOCYTE ESTERASE URINE: ABNORMAL
LYMPHOCYTES # BLD AUTO: 2.23 K/UL
LYMPHOCYTES NFR BLD AUTO: 34.9 %
MAN DIFF?: NORMAL
MCHC RBC-ENTMCNC: 30.3 PG
MCHC RBC-ENTMCNC: 33.3 GM/DL
MCV RBC AUTO: 90.8 FL
MICROSCOPIC-UA: NORMAL
MONOCYTES # BLD AUTO: 0.6 K/UL
MONOCYTES NFR BLD AUTO: 9.4 %
NEUTROPHILS # BLD AUTO: 3.22 K/UL
NEUTROPHILS NFR BLD AUTO: 50.4 %
NITRITE URINE: NEGATIVE
PH URINE: 6
PLATELET # BLD AUTO: 238 K/UL
POTASSIUM SERPL-SCNC: 4.3 MMOL/L
PROTEIN URINE: NORMAL
PT BLD: 11.7 SEC
RBC # BLD: 3.93 M/UL
RBC # FLD: 13.6 %
RED BLOOD CELLS URINE: 2 /HPF
SODIUM SERPL-SCNC: 141 MMOL/L
SPECIFIC GRAVITY URINE: 1.02
SQUAMOUS EPITHELIAL CELLS: 2 /HPF
UROBILINOGEN URINE: NORMAL
WBC # FLD AUTO: 6.39 K/UL
WHITE BLOOD CELLS URINE: 19 /HPF

## 2023-01-23 ENCOUNTER — APPOINTMENT (OUTPATIENT)
Dept: CARDIOLOGY | Facility: CLINIC | Age: 73
End: 2023-01-23
Payer: MEDICARE

## 2023-01-23 VITALS
HEIGHT: 66 IN | BODY MASS INDEX: 27.48 KG/M2 | DIASTOLIC BLOOD PRESSURE: 77 MMHG | TEMPERATURE: 98.4 F | WEIGHT: 171 LBS | HEART RATE: 62 BPM | SYSTOLIC BLOOD PRESSURE: 137 MMHG | OXYGEN SATURATION: 97 %

## 2023-01-23 DIAGNOSIS — Z01.810 ENCOUNTER FOR PREPROCEDURAL CARDIOVASCULAR EXAMINATION: ICD-10-CM

## 2023-01-23 DIAGNOSIS — I67.1 CEREBRAL ANEURYSM, NONRUPTURED: ICD-10-CM

## 2023-01-23 PROCEDURE — 99214 OFFICE O/P EST MOD 30 MIN: CPT

## 2023-01-23 NOTE — HISTORY OF PRESENT ILLNESS
[Preoperative Visit] : for a medical evaluation prior to surgery [Scheduled Procedure ___] : a [unfilled] [Date of Surgery ___] : on [unfilled] [Surgeon Name ___] : surgeon: [unfilled] [Stable] : Stable [Electrocardiogram] : ~T an ECG ~C was performed [Echocardiogram] : ~T an echocardiogram ~C was performed [Fair] : Fair [FreeTextEntry1] : 72F HTN, hx of brain aneurysm s/p stent, internal carotid artery aneurysm who presents for preoperative cardiovascular exam.\par \par Has a hx of some labile BP's but has recently been more stable\par Denies any active cardiac sx. No chest pain, orthopnea\par \par Has some chronic LLE pain\par \par Dr. Jc--fax--[phone]\par \par \par

## 2023-01-23 NOTE — ASSESSMENT
[As per surgery] : as per surgery [Patient Optimized for Surgery] : Patient optimized for surgery [FreeTextEntry4] : CARDIAC CLEARANCE UNDER SEPARATE COVER\par amlodipine increased to 5mg/day

## 2023-01-23 NOTE — PHYSICAL EXAM
Comment: Not itching now [General Appearance - Well Developed] : well developed Detail Level: Zone [General Appearance - Well Nourished] : well nourished [General Appearance - In No Acute Distress] : no acute distress Comment: Hx of scc [Normal Conjunctiva] : the conjunctiva exhibited no abnormalities [Normal Jugular Venous A Waves Present] : normal jugular venous A waves present [Normal Jugular Venous V Waves Present] : normal jugular venous V waves present [No Jugular Venous Owens A Waves] : no jugular venous owens A waves [Respiration, Rhythm And Depth] : normal respiratory rhythm and effort [Exaggerated Use Of Accessory Muscles For Inspiration] : no accessory muscle use [Auscultation Breath Sounds / Voice Sounds] : lungs were clear to auscultation bilaterally [Abdomen Soft] : soft [Abdomen Tenderness] : non-tender [Nail Clubbing] : no clubbing of the fingernails [Cyanosis, Localized] : no localized cyanosis [FreeTextEntry1] : +varicosities [Skin Turgor] : normal skin turgor [] : no rash [Oriented To Time, Place, And Person] : oriented to person, place, and time [Impaired Insight] : insight and judgment were intact [Affect] : the affect was normal [Mood] : the mood was normal

## 2023-01-23 NOTE — DISCUSSION/SUMMARY
[Optimized for Surgery] : the patient is optimized for surgery [FreeTextEntry1] : Patient is without active cardiac ischemia, uncontrolled arrhythmia, or decompensated heart failure.\par \par No cardiac contraindication to the planned procedure.\par \par \par \par \par \par Will refer to vascular surgery for routine evaluation given leg pain to r/o venous insufficiency and screen for additional aneurysms given her hx of arterial aneurysms

## 2023-01-23 NOTE — HISTORY OF PRESENT ILLNESS
[(Patient denies any chest pain, claudication, dyspnea on exertion, orthopnea, palpitations or syncope)] : Patient denies any chest pain, claudication, dyspnea on exertion, orthopnea, palpitations or syncope [No Pertinent Cardiac History] : no history of aortic stenosis, atrial fibrillation, coronary artery disease, recent myocardial infarction, or implantable device/pacemaker [No Pertinent Pulmonary History] : no history of asthma, COPD, sleep apnea, or smoking [Moderate (4-6 METs)] : Moderate (4-6 METs) [Chronic Kidney Disease] : no chronic kidney disease [Diabetes] : no diabetes [FreeTextEntry1] : Angiogram [FreeTextEntry2] : 1/26/23 [FreeTextEntry3] : [facility]--fax--[phone] [FreeTextEntry4] : reports occ bp lability [FreeTextEntry7] : echo 11/19--nlm lv fxn

## 2023-01-25 ENCOUNTER — APPOINTMENT (OUTPATIENT)
Dept: VASCULAR SURGERY | Facility: CLINIC | Age: 73
End: 2023-01-25
Payer: MEDICARE

## 2023-01-25 ENCOUNTER — TRANSCRIPTION ENCOUNTER (OUTPATIENT)
Age: 73
End: 2023-01-25

## 2023-01-25 VITALS
WEIGHT: 170 LBS | SYSTOLIC BLOOD PRESSURE: 124 MMHG | HEART RATE: 59 BPM | TEMPERATURE: 98 F | BODY MASS INDEX: 27.32 KG/M2 | DIASTOLIC BLOOD PRESSURE: 75 MMHG | HEIGHT: 66 IN | OXYGEN SATURATION: 97 %

## 2023-01-25 PROCEDURE — 99203 OFFICE O/P NEW LOW 30 MIN: CPT

## 2023-01-25 NOTE — PHYSICAL EXAM
[2+] : left 2+ [Ankle Swelling (On Exam)] : present [Ankle Swelling Bilaterally] : bilaterally  [Ankle Swelling On The Left] : moderate [Calm] : calm [Varicose Veins Of Lower Extremities] : not present [] : not present [de-identified] : Well-appearing  [de-identified] : EOMI, anicteric  [FreeTextEntry1] : prominent popliteal pulses bilaterally [de-identified] : soft, nt, nd  [de-identified] : motor and sensation intact in all four extremities  [de-identified] : no wounds bilateral feet  [de-identified] : A&Ox4

## 2023-01-25 NOTE — ASSESSMENT
[FreeTextEntry1] : FRANSISCA PALACIOS is a 72 year old female presents for evaluation.\par \par > Venous insufficiency, CEAP C3\par - Will obtain lower extremity venous reflux studies for further evaluation. \par - For symptom management, will prescribe compression stockings (20-30 mmhg) and recommend leg elevation, and ambulation. \par - Given history of intracranial artery aneurysms and prominent popliteal pulses, will also obtain lower extremity arterial duplex to rule out popliteal artery aneurysms. \par - Follow up after above studies completed.

## 2023-01-25 NOTE — HISTORY OF PRESENT ILLNESS
[FreeTextEntry1] : FRANSISCA PALACIOS is a 72 year old female who presents for evaluation of bilateral leg swelling. \par \par Referred by Dr. Keith Rogers. \par \par She presents with her daughter. She speaks [place] Islander, and daughter is acting as . Patient states that she has had lower extremity swelling for one year. She reports pain in the bilateral legs. The swelling gets worse as the day progresses. At night, the leg pain also increases. No personal or family history of DVT. Patient has not worn compression stockings. 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 Has six children. \par \par + PMH: arthritis, HTN, brain aneurysm s/p stent, osteoporosis\par + PSH: s/p intracranial stent, s/p cholecystectomy, s/p shoulder surgery\par + FH: Arthritis\par + SH: never smoker, no etoh use\par + Aller: reports an antibiotic but patient does not remember the name. \par \par PCP is Dr. Ronnie Mccullough.\par Cardiologist is Dr. Jack Roegrs.

## 2023-01-26 ENCOUNTER — TRANSCRIPTION ENCOUNTER (OUTPATIENT)
Age: 73
End: 2023-01-26

## 2023-01-26 ENCOUNTER — OUTPATIENT (OUTPATIENT)
Dept: OUTPATIENT SERVICES | Facility: HOSPITAL | Age: 73
LOS: 1 days | Discharge: HOME | End: 2023-01-26
Payer: MEDICARE

## 2023-01-26 VITALS
SYSTOLIC BLOOD PRESSURE: 121 MMHG | TEMPERATURE: 99 F | RESPIRATION RATE: 16 BRPM | HEIGHT: 65 IN | HEART RATE: 73 BPM | WEIGHT: 171.08 LBS | DIASTOLIC BLOOD PRESSURE: 61 MMHG | OXYGEN SATURATION: 95 %

## 2023-01-26 DIAGNOSIS — Z86.73 PERSONAL HISTORY OF TRANSIENT ISCHEMIC ATTACK (TIA), AND CEREBRAL INFARCTION WITHOUT RESIDUAL DEFICITS: ICD-10-CM

## 2023-01-26 DIAGNOSIS — M81.0 AGE-RELATED OSTEOPOROSIS WITHOUT CURRENT PATHOLOGICAL FRACTURE: ICD-10-CM

## 2023-01-26 DIAGNOSIS — R10.13 EPIGASTRIC PAIN: ICD-10-CM

## 2023-01-26 DIAGNOSIS — Z87.440 PERSONAL HISTORY OF URINARY (TRACT) INFECTIONS: ICD-10-CM

## 2023-01-26 DIAGNOSIS — F41.9 ANXIETY DISORDER, UNSPECIFIED: ICD-10-CM

## 2023-01-26 DIAGNOSIS — Z98.890 OTHER SPECIFIED POSTPROCEDURAL STATES: Chronic | ICD-10-CM

## 2023-01-26 DIAGNOSIS — M54.2 CERVICALGIA: ICD-10-CM

## 2023-01-26 DIAGNOSIS — I67.1 CEREBRAL ANEURYSM, NONRUPTURED: ICD-10-CM

## 2023-01-26 DIAGNOSIS — Z90.49 ACQUIRED ABSENCE OF OTHER SPECIFIED PARTS OF DIGESTIVE TRACT: Chronic | ICD-10-CM

## 2023-01-26 DIAGNOSIS — G47.00 INSOMNIA, UNSPECIFIED: ICD-10-CM

## 2023-01-26 DIAGNOSIS — Z90.49 ACQUIRED ABSENCE OF OTHER SPECIFIED PARTS OF DIGESTIVE TRACT: ICD-10-CM

## 2023-01-26 DIAGNOSIS — Z79.899 OTHER LONG TERM (CURRENT) DRUG THERAPY: ICD-10-CM

## 2023-01-26 DIAGNOSIS — R73.03 PREDIABETES: ICD-10-CM

## 2023-01-26 DIAGNOSIS — I10 ESSENTIAL (PRIMARY) HYPERTENSION: ICD-10-CM

## 2023-01-26 DIAGNOSIS — Z79.52 LONG TERM (CURRENT) USE OF SYSTEMIC STEROIDS: ICD-10-CM

## 2023-01-26 DIAGNOSIS — E78.5 HYPERLIPIDEMIA, UNSPECIFIED: ICD-10-CM

## 2023-01-26 PROCEDURE — C1894: CPT

## 2023-01-26 PROCEDURE — C1887: CPT

## 2023-01-26 PROCEDURE — C1769: CPT

## 2023-01-26 PROCEDURE — 36224 PLACE CATH CAROTD ART: CPT | Mod: 50

## 2023-01-26 PROCEDURE — C1760: CPT

## 2023-01-26 RX ORDER — CHLORHEXIDINE GLUCONATE 213 G/1000ML
1 SOLUTION TOPICAL ONCE
Refills: 0 | Status: DISCONTINUED | OUTPATIENT
Start: 2023-01-26 | End: 2023-02-09

## 2023-01-26 RX ORDER — ASPIRIN/CALCIUM CARB/MAGNESIUM 324 MG
325 TABLET ORAL ONCE
Refills: 0 | Status: COMPLETED | OUTPATIENT
Start: 2023-01-26 | End: 2023-01-26

## 2023-01-26 RX ADMIN — Medication 325 MILLIGRAM(S): at 12:03

## 2023-01-26 NOTE — PRE-ANESTHESIA EVALUATION ADULT - NSANTHPMHFT_GEN_ALL_CORE
76yo Romanian speaking female with hx of HTN, osteoporosis, cerebral aneurysm s/p rupture and coiling a year ago, presents for follow up diagnostic procedure. She takes Plavix and last dose was yesterday

## 2023-01-26 NOTE — PROGRESS NOTE ADULT - SUBJECTIVE AND OBJECTIVE BOX
Pre-Procedure Note  71 y/o F with PMH of HTN, HLD who presents today for cerebral angiogram.       Vital Signs Last 24 Hrs  T(C): 37 (26 Jan 2023 07:12), Max: 37 (26 Jan 2023 07:12)  T(F): --  HR: 73 (26 Jan 2023 07:12) (73 - 73)  BP: 121/61 (26 Jan 2023 07:12) (121/61 - 121/61)  BP(mean): --  RR: 16 (26 Jan 2023 07:12) (16 - 16)  SpO2: 95% (26 Jan 2023 07:12) (95% - 95%)      PHYSICAL EXAM:  General: Patient appears well, seating on stretcher, in NAD.  HEENT: PERRL, 2mm b/l. EOMI b/l.   Respiratory: non-labored breathing, symmetrical chest rise  Cardiovascular: regular rate and rhythm  Abdominal: soft, nontender, nondistended  Vascular: extremities warm, without lesions or discoloration. Distal pulses 2+ b/l.  Neurologic: AA&O x 3, conversant, fluent speech in Austrian.     CN II-X intact, face symmetric, tongue midline. No pronator drift.    Motor: KAUFMAN x4, strength 5/5 in upper and lower extremities b/l    Sensation: Intact to light touch throughout upper and lower extremities b/l      Allergies      MEDICATIONS:  Aspirin 325mg  Plavix 75mg  Atenolol 50mg   Amlodipine 2.5mg  Oxybutynin ER 100mg      ASSESSMENT:  71 y/o F with PMH of HTN, HLD who presents today for cerebral angiogram.     PLAN:     Pre-Procedure Note  71 y/o F with PMH of HTN, HLD, and b/l pcomm aneurysms, who presents today for cerebral angiogram.       Vital Signs Last 24 Hrs  T(C): 37 (26 Jan 2023 07:12), Max: 37 (26 Jan 2023 07:12)  T(F): --  HR: 73 (26 Jan 2023 07:12) (73 - 73)  BP: 121/61 (26 Jan 2023 07:12) (121/61 - 121/61)  BP(mean): --  RR: 16 (26 Jan 2023 07:12) (16 - 16)  SpO2: 95% (26 Jan 2023 07:12) (95% - 95%)      PHYSICAL EXAM:  General: Patient appears well, seating on stretcher, in NAD.  HEENT: PERRL, 2mm b/l. EOMI b/l.   Respiratory: non-labored breathing, symmetrical chest rise  Cardiovascular: regular rate and rhythm  Abdominal: soft, nontender, nondistended  Vascular: extremities warm, without lesions or discoloration. Distal pulses 2+ b/l.  Neurologic: AA&O x 3, conversant, fluent speech in Turkish.     CN II-X intact, face symmetric, tongue midline. No pronator drift.    Motor: KAUFMAN x4, strength 5/5 in upper and lower extremities b/l    Sensation: Intact to light touch throughout upper and lower extremities b/l      Allergies  Ciprofloxacin, Trimethoprim    MEDICATIONS:  Aspirin 325mg, PO, daily   Plavix 75mg, PO, daily   Atenolol 50mg, PO, daily   Amlodipine 2.5mg, PO, daily   Oxybutynin ER 100mg, PO, daily       ASSESSMENT:  71 y/o F with PMH of HTN, HLD and b/l pcomm aneurysms who presents today for cerebral angiogram.     PLAN:  - neuro/vital/pulse checks  - bedrest 4 hours  - discontinue home plavix   - continue home aspirin, atenolol, amlodipine, and oxybutynin

## 2023-01-26 NOTE — PACU DISCHARGE NOTE - COMMENTS
The patient meets PACU discharge criteria and can be discharged when the requirements by the primary team is met.

## 2023-01-26 NOTE — BRIEF OPERATIVE NOTE - OPERATION/FINDINGS
Under MAC, using a 5 fr sheath right CFA, cerebral angiogram was performed.    Findings: Near complete obliteration of bilateral supraclinoid ICA aneurysms by flow diverter stents (Pipeline stents), minimal flow in aneurysms, no in stent stenosis.  Full report to follow.  Patient tolerated procedure well, no new neurological deficit, hemodynamically stable.  Right groin/vasc access site: Vascade and manual compression applied, hemostasis achieved, no hematoma.  Patient was transferred to Cath lab recovery area and will go home later today.  Above d/w: Dr. Jc

## 2023-01-26 NOTE — PRE-ANESTHESIA EVALUATION ADULT - NSANTHOSAYNRD_GEN_A_CORE
No. AKILA screening performed.  STOP BANG Legend: 0-2 = LOW Risk; 3-4 = INTERMEDIATE Risk; 5-8 = HIGH Risk

## 2023-03-09 NOTE — BRIEF OPERATIVE NOTE - TYPE OF ANESTHESIA
Attempted [facility] monthly outreach,  left message for patient to call back ,can be reached at  [phone]. Will try back at a later time. Medical record reviewed including recent office visits and test results.     Chart review - 3 min  Time with patient - 2 min  Total time - 5 min
General

## 2023-04-24 ENCOUNTER — APPOINTMENT (OUTPATIENT)
Dept: INTERNAL MEDICINE | Facility: CLINIC | Age: 73
End: 2023-04-24
Payer: MEDICARE

## 2023-04-24 VITALS
WEIGHT: 178 LBS | HEIGHT: 66 IN | BODY MASS INDEX: 28.61 KG/M2 | HEART RATE: 68 BPM | OXYGEN SATURATION: 97 % | TEMPERATURE: 98.1 F

## 2023-04-24 VITALS — DIASTOLIC BLOOD PRESSURE: 74 MMHG | SYSTOLIC BLOOD PRESSURE: 122 MMHG

## 2023-04-24 PROCEDURE — 99213 OFFICE O/P EST LOW 20 MIN: CPT

## 2023-04-24 NOTE — HISTORY OF PRESENT ILLNESS
[Spouse] : spouse [FreeTextEntry1] : f/u htn [de-identified] : going overseas for 6 months to [place]\par req med refills\par she is unclear about the results of her angiogram\par to see PAULINA Mccurdy)

## 2023-07-27 ENCOUNTER — RX RENEWAL (OUTPATIENT)
Age: 73
End: 2023-07-27

## 2023-10-31 ENCOUNTER — RX RENEWAL (OUTPATIENT)
Age: 73
End: 2023-10-31

## 2023-12-06 ENCOUNTER — NON-APPOINTMENT (OUTPATIENT)
Age: 73
End: 2023-12-06

## 2023-12-06 ENCOUNTER — APPOINTMENT (OUTPATIENT)
Dept: CARDIOLOGY | Facility: CLINIC | Age: 73
End: 2023-12-06
Payer: MEDICARE

## 2023-12-06 VITALS
TEMPERATURE: 97.7 F | DIASTOLIC BLOOD PRESSURE: 66 MMHG | BODY MASS INDEX: 28.61 KG/M2 | HEART RATE: 76 BPM | SYSTOLIC BLOOD PRESSURE: 118 MMHG | HEIGHT: 66 IN | OXYGEN SATURATION: 90 % | WEIGHT: 178 LBS

## 2023-12-06 DIAGNOSIS — R06.09 OTHER FORMS OF DYSPNEA: ICD-10-CM

## 2023-12-06 DIAGNOSIS — G45.9 TRANSIENT CEREBRAL ISCHEMIC ATTACK, UNSPECIFIED: ICD-10-CM

## 2023-12-06 PROCEDURE — 93000 ELECTROCARDIOGRAM COMPLETE: CPT

## 2023-12-06 PROCEDURE — 99215 OFFICE O/P EST HI 40 MIN: CPT

## 2023-12-06 RX ORDER — SULFAMETHOXAZOLE AND TRIMETHOPRIM 800; 160 MG/1; MG/1
800-160 TABLET ORAL TWICE DAILY
Qty: 14 | Refills: 3 | Status: COMPLETED | COMMUNITY
Start: 2022-01-03 | End: 2023-12-06

## 2023-12-06 RX ORDER — FLUCONAZOLE 150 MG/1
150 TABLET ORAL DAILY
Qty: 1 | Refills: 0 | Status: COMPLETED | COMMUNITY
Start: 2021-12-22 | End: 2023-12-06

## 2023-12-06 RX ORDER — TERCONAZOLE 4 MG/G
0.4 CREAM VAGINAL DAILY
Qty: 1 | Refills: 0 | Status: COMPLETED | COMMUNITY
Start: 2022-11-01 | End: 2023-12-06

## 2023-12-06 RX ORDER — PANTOPRAZOLE 40 MG/1
40 TABLET, DELAYED RELEASE ORAL
Qty: 90 | Refills: 1 | Status: COMPLETED | COMMUNITY
Start: 2019-04-29 | End: 2023-12-06

## 2023-12-06 RX ORDER — RISEDRONATE SODIUM 35 MG/1
35 TABLET, FILM COATED ORAL
Qty: 4 | Refills: 5 | Status: COMPLETED | COMMUNITY
Start: 2021-03-02 | End: 2023-12-06

## 2023-12-06 RX ORDER — METHYLPREDNISOLONE 4 MG/1
4 TABLET ORAL DAILY
Refills: 0 | Status: COMPLETED | COMMUNITY
Start: 2021-12-09 | End: 2023-12-06

## 2023-12-06 RX ORDER — CYCLOBENZAPRINE HYDROCHLORIDE 5 MG/1
5 TABLET, FILM COATED ORAL 3 TIMES DAILY
Qty: 15 | Refills: 0 | Status: COMPLETED | COMMUNITY
Start: 2020-06-30 | End: 2023-12-06

## 2023-12-06 RX ORDER — TRAMADOL HYDROCHLORIDE 25 MG/1
TABLET, COATED ORAL
Refills: 0 | Status: COMPLETED | COMMUNITY
End: 2023-12-06

## 2023-12-06 RX ORDER — NITROFURANTOIN (MONOHYDRATE/MACROCRYSTALS) 25; 75 MG/1; MG/1
100 CAPSULE ORAL
Qty: 10 | Refills: 0 | Status: COMPLETED | COMMUNITY
Start: 2021-12-28 | End: 2023-12-06

## 2023-12-06 RX ORDER — GABAPENTIN 300 MG/1
300 CAPSULE ORAL
Refills: 0 | Status: COMPLETED | COMMUNITY
End: 2023-12-06

## 2023-12-06 RX ORDER — SULFAMETHOXAZOLE AND TRIMETHOPRIM 800; 160 MG/1; MG/1
800-160 TABLET ORAL TWICE DAILY
Qty: 14 | Refills: 3 | Status: COMPLETED | COMMUNITY
Start: 2022-01-01 | End: 2023-12-06

## 2023-12-06 RX ORDER — CEPHALEXIN 750 MG/1
CAPSULE ORAL
Refills: 0 | Status: COMPLETED | COMMUNITY
End: 2023-12-06

## 2023-12-06 RX ORDER — FAMOTIDINE 40 MG/1
40 TABLET, FILM COATED ORAL
Qty: 30 | Refills: 0 | Status: COMPLETED | COMMUNITY
Start: 2021-11-19 | End: 2023-12-06

## 2023-12-06 RX ORDER — OMEGA-3/DHA/EPA/FISH OIL 300-1000MG
1000 CAPSULE ORAL
Refills: 0 | Status: COMPLETED | COMMUNITY
End: 2023-12-06

## 2023-12-06 RX ORDER — ACETAMINOPHEN AND CODEINE 300; 30 MG/1; MG/1
300-30 TABLET ORAL
Qty: 12 | Refills: 0 | Status: COMPLETED | COMMUNITY
Start: 2021-08-20 | End: 2023-12-06

## 2023-12-06 RX ORDER — FESOTERODINE FUMARATE 4 MG/1
4 TABLET, FILM COATED, EXTENDED RELEASE ORAL
Refills: 0 | Status: COMPLETED | COMMUNITY
End: 2023-12-06

## 2023-12-06 RX ORDER — ALPRAZOLAM 0.25 MG/1
0.25 TABLET ORAL DAILY
Qty: 10 | Refills: 0 | Status: COMPLETED | COMMUNITY
Start: 2019-12-23 | End: 2023-12-06

## 2023-12-06 RX ORDER — OXYBUTYNIN CHLORIDE 10 MG/1
10 TABLET, EXTENDED RELEASE ORAL
Qty: 90 | Refills: 0 | Status: COMPLETED | COMMUNITY
Start: 2021-06-26 | End: 2023-12-06

## 2023-12-06 RX ORDER — CLOPIDOGREL BISULFATE 75 MG/1
75 TABLET, FILM COATED ORAL
Qty: 14 | Refills: 0 | Status: COMPLETED | COMMUNITY
Start: 2021-06-24 | End: 2023-12-06

## 2023-12-06 RX ORDER — FLUCONAZOLE 100 MG/1
100 TABLET ORAL
Qty: 7 | Refills: 3 | Status: COMPLETED | COMMUNITY
Start: 2021-12-28 | End: 2023-12-06

## 2023-12-06 RX ORDER — PREDNISONE 10 MG/1
10 TABLET ORAL
Qty: 10 | Refills: 0 | Status: COMPLETED | COMMUNITY
Start: 2022-01-07 | End: 2023-12-06

## 2023-12-07 PROBLEM — G45.9 TRANSIENT CEREBRAL ISCHEMIC ATTACK: Status: ACTIVE | Noted: 2019-11-22

## 2023-12-13 ENCOUNTER — OUTPATIENT (OUTPATIENT)
Dept: OUTPATIENT SERVICES | Facility: HOSPITAL | Age: 73
LOS: 1 days | End: 2023-12-13
Payer: MEDICARE

## 2023-12-13 ENCOUNTER — APPOINTMENT (OUTPATIENT)
Dept: CV DIAGNOSTICS | Facility: HOSPITAL | Age: 73
End: 2023-12-13

## 2023-12-13 DIAGNOSIS — Z90.49 ACQUIRED ABSENCE OF OTHER SPECIFIED PARTS OF DIGESTIVE TRACT: Chronic | ICD-10-CM

## 2023-12-13 DIAGNOSIS — Z98.890 OTHER SPECIFIED POSTPROCEDURAL STATES: Chronic | ICD-10-CM

## 2023-12-13 DIAGNOSIS — R06.09 OTHER FORMS OF DYSPNEA: ICD-10-CM

## 2023-12-13 PROCEDURE — A9500: CPT

## 2023-12-13 PROCEDURE — 78452 HT MUSCLE IMAGE SPECT MULT: CPT | Mod: MH

## 2023-12-13 PROCEDURE — 93018 CV STRESS TEST I&R ONLY: CPT | Mod: MH

## 2023-12-13 PROCEDURE — 93017 CV STRESS TEST TRACING ONLY: CPT

## 2023-12-13 PROCEDURE — 78452 HT MUSCLE IMAGE SPECT MULT: CPT | Mod: 26,MH

## 2023-12-13 PROCEDURE — 93016 CV STRESS TEST SUPVJ ONLY: CPT | Mod: MH

## 2023-12-15 NOTE — CONSULT NOTE ADULT - NSPROBSELRECBLANK_6_GEN
Per Dr Bridget Mishra 6/2023:    AF:  Controlled:  Continue metoprolol but increase to 50 mg am and 25 mg in the pm and continue eliquis
DISPLAY PLAN FREE TEXT

## 2023-12-20 ENCOUNTER — APPOINTMENT (OUTPATIENT)
Dept: VASCULAR SURGERY | Facility: CLINIC | Age: 73
End: 2023-12-20
Payer: MEDICARE

## 2023-12-20 VITALS
TEMPERATURE: 98.2 F | BODY MASS INDEX: 28.12 KG/M2 | OXYGEN SATURATION: 98 % | WEIGHT: 175 LBS | SYSTOLIC BLOOD PRESSURE: 115 MMHG | DIASTOLIC BLOOD PRESSURE: 61 MMHG | HEIGHT: 66 IN | HEART RATE: 65 BPM | RESPIRATION RATE: 15 BRPM

## 2023-12-20 PROCEDURE — 99213 OFFICE O/P EST LOW 20 MIN: CPT

## 2023-12-21 NOTE — HISTORY OF PRESENT ILLNESS
[FreeTextEntry1] : RICH PALACIOS is a 73 year old female who presents for evaluation of leg swelling.   Referred by Dr. Keith Rogers.   Patient presents with daughter, Guero. Patient is Lithuanian-speaking and daughter is acting as .  Patient has had bilateral leg swelling for the past 6 months to a year. The swelling is worsened during the day, especially if she is standing. Decreased in the morning when she first wakes up. When swelling is severe, she has worsening leg pain. She reports leg heaviness and burning sensation on the feet (on the dorsum and plantar aspects). Personal history of DVT - None Family history of DVT - None Family history of venous insufficiency or varicose veins - None Current compression stocking usage - She has been using compression stockings. She finds them tight but they do help with the swelling.  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 Ronnie Mccullough.  Cardiologist is Dr. Keith Rogers.

## 2023-12-21 NOTE — ASSESSMENT
[FreeTextEntry1] : RICH PALACIOS is a 73 year old female presents for evaluation.   > Venous insufficiency, CEAP C3 - Will obtain venous reflux studies for further evaluation at next visit.  - For symptom management, recommend use of compression stockings (20-30 mmhg, prescription given), leg elevation, and ambulation.

## 2023-12-21 NOTE — PHYSICAL EXAM
[2+] : left 2+ [Ankle Swelling (On Exam)] : present [Varicose Veins Of Lower Extremities] : present [Ankle Swelling Bilaterally] : bilaterally  [Varicose Veins Of The Left Leg] : of the left leg [Ankle Swelling On The Right] : mild [Calm] : calm [] : not present [de-identified] : Well-appearing  [de-identified] : EOMI, anicteric  [de-identified] : soft, nt, nd  [de-identified] : motor and sensation intact in all four extremities  [de-identified] : no wounds on bilateral feet [de-identified] : A&Ox4

## 2023-12-27 ENCOUNTER — APPOINTMENT (OUTPATIENT)
Dept: ULTRASOUND IMAGING | Facility: CLINIC | Age: 73
End: 2023-12-27
Payer: MEDICARE

## 2023-12-27 ENCOUNTER — APPOINTMENT (OUTPATIENT)
Dept: CARDIOLOGY | Facility: CLINIC | Age: 73
End: 2023-12-27
Payer: MEDICARE

## 2023-12-27 PROCEDURE — 76770 US EXAM ABDO BACK WALL COMP: CPT

## 2023-12-27 PROCEDURE — 93306 TTE W/DOPPLER COMPLETE: CPT

## 2024-01-04 ENCOUNTER — MESSAGE (OUTPATIENT)
Age: 74
End: 2024-01-04

## 2024-01-10 ENCOUNTER — APPOINTMENT (OUTPATIENT)
Dept: VASCULAR SURGERY | Facility: CLINIC | Age: 74
End: 2024-01-10
Payer: MEDICARE

## 2024-01-10 VITALS
HEIGHT: 66 IN | DIASTOLIC BLOOD PRESSURE: 62 MMHG | WEIGHT: 180 LBS | SYSTOLIC BLOOD PRESSURE: 101 MMHG | OXYGEN SATURATION: 95 % | BODY MASS INDEX: 28.93 KG/M2 | HEART RATE: 55 BPM | TEMPERATURE: 98.1 F

## 2024-01-10 DIAGNOSIS — M79.89 OTHER SPECIFIED SOFT TISSUE DISORDERS: ICD-10-CM

## 2024-01-10 DIAGNOSIS — I83.893 VARICOSE VEINS OF BILATERAL LOWER EXTREMITIES WITH OTHER COMPLICATIONS: ICD-10-CM

## 2024-01-10 PROCEDURE — 93970 EXTREMITY STUDY: CPT

## 2024-01-10 PROCEDURE — 99213 OFFICE O/P EST LOW 20 MIN: CPT

## 2024-01-10 NOTE — DATA REVIEWED
[FreeTextEntry1] : 1/10/2024 - BLE venous duplex Negative for DVT/SVT bilaterally. Negative for venous insufficiency bilaterally. Pulsatile venous flow is noted bilaterally.

## 2024-01-10 NOTE — PHYSICAL EXAM
[2+] : left 2+ [Ankle Swelling (On Exam)] : present [Ankle Swelling Bilaterally] : bilaterally  [Varicose Veins Of Lower Extremities] : present [Varicose Veins Of The Left Leg] : of the left leg [Calm] : calm [Ankle Swelling On The Right] : mild [] : not present [de-identified] : Well-appearing  [de-identified] : EOMI, anicteric  [de-identified] : soft, nt, nd  [de-identified] : motor and sensation intact in all four extremities  [de-identified] : A&Ox4  [de-identified] : no wounds on bilateral feet

## 2024-01-10 NOTE — HISTORY OF PRESENT ILLNESS
[FreeTextEntry1] : RICH PALACIOS is a 73 year old female who presents for evaluation of leg swelling.   Referred by Dr. Keith Rogers.   Patient presents with daughter, Guero. Patient is Amharic-speaking and daughter is acting as .  Patient has had bilateral leg swelling for the past 6 months to a year. The swelling is worsened during the day, especially if she is standing. Decreased in the morning when she first wakes up. When swelling is severe, she has worsening leg pain. She reports leg heaviness and burning sensation on the feet (on the dorsum and plantar aspects). Personal history of DVT - None Family history of DVT - None Family history of venous insufficiency or varicose veins - None Current compression stocking usage - She has been using compression stockings. She finds them tight but they do help with the swelling.  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 Ronnie Mccullough.  Cardiologist is Dr. Keith Rogers.  [de-identified] : 1/10/2024 - Pt presents with daughter for follow up. She is doing well since last visit with improvement in leg discomfort and swelling. She has been using compression stockings regularly with relief of her symptoms.

## 2024-01-10 NOTE — CONSULT LETTER
[Dear  ___] : Dear  [unfilled], [Consult Letter:] : I had the pleasure of evaluating your patient, [unfilled]. [Please see my note below.] : Please see my note below. [Consult Closing:] : Thank you very much for allowing me to participate in the care of this patient.  If you have any questions, please do not hesitate to contact me. [Sincerely,] : Sincerely, [FreeTextEntry1] : She presented to me for evaluation of bilateral leg swelling. At our initial visit, she had palpable lower extremity pulses with bilateral ankle edema. I advised her to use compression stockings and brought her back for venous reflux studies. This was negative for deep venous thrombosis, superficial venous thrombophlebitis, and venous insufficiency. She was noted to have pulsatile venous flow bilaterally.  I discussed these results with the patient. Her symptoms were better at the follow up visit since using compression stockings. Therefore, I advised her to continue to use compression, elevate her legs, and continue ambulation.  [FreeTextEntry3] :     Viv Graham MD, FACS, RPVI Division of Vascular & Endovascular Surgery [facility], Department of Surgery

## 2024-01-10 NOTE — ASSESSMENT
[FreeTextEntry1] : RICH PALACIOS is a 73 year old female presents for evaluation.   > Bilateral leg swelling - Reviewed results of venous duplex w/ patient and daughter. Negative for DVT, SVT, venous insufficiency bilaterally. There is pulsatile venous flow noted on the duplex bilaterally.   - As patient's are improved with conservative therapy, would recommend continuation. Continue use of compression stockings, leg elevation, and ambulation.  - Follow up as needed.

## 2024-01-19 ENCOUNTER — LABORATORY RESULT (OUTPATIENT)
Age: 74
End: 2024-01-19

## 2024-01-19 ENCOUNTER — APPOINTMENT (OUTPATIENT)
Dept: INTERNAL MEDICINE | Facility: CLINIC | Age: 74
End: 2024-01-19
Payer: MEDICARE

## 2024-01-19 VITALS
BODY MASS INDEX: 29.15 KG/M2 | TEMPERATURE: 98.1 F | OXYGEN SATURATION: 95 % | SYSTOLIC BLOOD PRESSURE: 103 MMHG | HEART RATE: 59 BPM | DIASTOLIC BLOOD PRESSURE: 64 MMHG | WEIGHT: 181.38 LBS | HEIGHT: 66 IN | RESPIRATION RATE: 15 BRPM

## 2024-01-19 PROCEDURE — 99213 OFFICE O/P EST LOW 20 MIN: CPT

## 2024-01-19 PROCEDURE — 36415 COLL VENOUS BLD VENIPUNCTURE: CPT

## 2024-01-19 NOTE — HISTORY OF PRESENT ILLNESS
[FreeTextEntry1] : follow up  [de-identified] : 73 year old female here for follow up. patient accompanied by daughter states she feels well. wants thyroid checked. patients daughter states she is on antibiotics for Uti but states she still has pelvic pain at night, denies any chest pain no sob. going to her country in [place] feels well overall.

## 2024-01-22 NOTE — DISCHARGE NOTE PROVIDER - NSDCMRMEDTOKEN_GEN_ALL_CORE_FT
Patient Education    [employer] HANDOUT- PATIENT  7 YEAR VISIT  Here are some suggestions from [employer] experts that may be of value to your family.     TAKING CARE OF YOU  If you get angry with someone, try to walk away.  Don t try cigarettes or e-cigarettes. They are bad for you. Walk away if someone offers you one.  Talk with us if you are worried about alcohol or drug use in your family.  Go online only when your parents say it s OK. Don t give your name, address, or phone number on a Web site unless your parents say it s OK.  If you want to chat online, tell your parents first.  If you feel scared online, get off and tell your parents.  Enjoy spending time with your family. Help out at home.    EATING WELL AND BEING ACTIVE  Brush your teeth at least twice each day, morning and night.  Floss your teeth every day.  Wear a mouth guard when playing sports.  Eat breakfast every day.  Be a healthy eater. It helps you do well in school and sports.  Have vegetables, fruits, lean protein, and whole grains at meals and snacks.  Eat when you re hungry. Stop when you feel satisfied.  Eat with your family often.  If you drink fruit juice, drink only 1 cup of 100% fruit juice a day.  Limit high-fat foods and drinks such as candies, snacks, fast food, and soft drinks.  Have healthy snacks such as fruit, cheese, and yogurt.  Drink at least 3 glasses of milk daily.  Turn off the TV, tablet, or computer. Get up and play instead.  Go out and play several times a day.    HANDLING FEELINGS  Talk about your worries. It helps.  Talk about feeling mad or sad with someone who you trust and listens well.  Ask your parent or another trusted adult about changes in your body.  Even questions that feel embarrassing are important. It s OK to talk about your body and how it s changing.    DOING WELL AT SCHOOL  Try to do your best at school. Doing well in school helps you feel good about yourself.  Ask for help when you need  it.  Find clubs and teams to join.  Tell kids who pick on you or try to hurt you to stop. Then walk away.  Tell adults you trust about bullies.  PLAYING IT SAFE  Make sure you re always buckled into your booster seat and ride in the back seat of the car. That is where you are safest.  Wear your helmet and safety gear when riding scooters, biking, skating, in-line skating, skiing, snowboarding, and horseback riding.  Ask your parents about learning to swim. Never swim without an adult nearby.  Always wear sunscreen and a hat when you re outside. Try not to be outside for too long between 11:00 am and 3:00 pm, when it s easy to get a sunburn.  Don t open the door to anyone you don t know.  Have friends over only when your parents say it s OK.  Ask a grown-up for help if you are scared or worried.  It is OK to ask to go home from a friend s house and be with your mom or dad.  Keep your private parts (the parts of your body covered by a bathing suit) covered.  Tell your parent or another grown-up right away if an older child or a grown-up  Shows you his or her private parts.  Asks you to show him or her yours.  Touches your private parts.  Scares you or asks you not to tell your parents.  If that person does any of these things, get away as soon as you can and tell your parent or another adult you trust.  If you see a gun, don t touch it. Tell your parents right away.        Consistent with Bright Futures: Guidelines for Health Supervision of Infants, Children, and Adolescents, 4th Edition  For more information, go to https://brightfutures.aap.org.             Patient Education    BRIGHT FUTURES HANDOUT- PARENT  8 YEAR VISIT  Here are some suggestions from [employer] experts that may be of value to your family.     HOW YOUR FAMILY IS DOING  Encourage your child to be independent and responsible. Hug and praise her.  Spend time with your child. Get to know her friends and their families.  Take pride in your child for  good behavior and doing well in school.  Help your child deal with conflict.  If you are worried about your living or food situation, talk with us. Community agencies and programs such as SNAP can also provide information and assistance.  Don t smoke or use e-cigarettes. Keep your home and car smoke-free. Tobacco-free spaces keep children healthy.  Don t use alcohol or drugs. If you re worried about a family member s use, let us know, or reach out to local or online resources that can help.  Put the family computer in a central place.  Know who your child talks with online.  Install a safety filter.    STAYING HEALTHY  Take your child to the dentist twice a year.  Give a fluoride supplement if the dentist recommends it.  Help your child brush her teeth twice a day  After breakfast  Before bed  Use a pea-sized amount of toothpaste with fluoride.  Help your child floss her teeth once a day.  Encourage your child to always wear a mouth guard to protect her teeth while playing sports.  Encourage healthy eating by  Eating together often as a family  Serving vegetables, fruits, whole grains, lean protein, and low-fat or fat-free dairy  Limiting sugars, salt, and low-nutrient foods  Limit screen time to 2 hours (not counting schoolwork).  Don t put a TV or computer in your child s bedroom.  Consider making a family media use plan. It helps you make rules for media use and balance screen time with other activities, including exercise.  Encourage your child to play actively for at least 1 hour daily.    YOUR GROWING CHILD  Give your child chores to do and expect them to be done.  Be a good role model.  Don t hit or allow others to hit.  Help your child do things for himself.  Teach your child to help others.  Discuss rules and consequences with your child.  Be aware of puberty and changes in your child s body.  Use simple responses to answer your child s questions.  Talk with your child about what worries  him.    SCHOOL  Help your child get ready for school. Use the following strategies:  Create bedtime routines so he gets 10 to 11 hours of sleep.  Offer him a healthy breakfast every morning.  Attend back-to-school night, parent-teacher events, and as many other school events as possible.  Talk with your child and child s teacher about bullies.  Talk with your child s teacher if you think your child might need extra help or tutoring.  Know that your child s teacher can help with evaluations for special help, if your child is not doing well in school.    SAFETY  The back seat is the safest place to ride in a car until your child is 13 years old.  Your child should use a belt-positioning booster seat until the vehicle s lap and shoulder belts fit.  Teach your child to swim and watch her in the water.  Use a hat, sun protection clothing, and sunscreen with SPF of 15 or higher on her exposed skin. Limit time outside when the sun is strongest (11:00 am-3:00 pm).  Provide a properly fitting helmet and safety gear for riding scooters, biking, skating, in-line skating, skiing, snowboarding, and horseback riding.  If it is necessary to keep a gun in your home, store it unloaded and locked with the ammunition locked separately from the gun.  Teach your child plans for emergencies such as a fire. Teach your child how and when to dial 911.  Teach your child how to be safe with other adults.  No adult should ask a child to keep secrets from parents.  No adult should ask to see a child s private parts.  No adult should ask a child for help with the adult s own private parts.        Helpful Resources:  Family Media Use Plan: www.healthychildren.org/MediaUsePlan  Smoking Quit Line: 816.203.2391 Information About Car Safety Seats: www.safercar.gov/parents  Toll-free Auto Safety Hotline: 121.248.3844  Consistent with Bright Futures: Guidelines for Health Supervision of Infants, Children, and Adolescents, 4th Edition  For more  information, go to https://brightfutures.aap.org.              aspirin 81 mg oral tablet, chewable: 1 tab(s) orally once a day  atenolol 50 mg oral tablet: 1 tab(s) orally once a day  Calcium 600+D 600 mg-5 mcg (200 intl units) oral tablet: 1 tab(s) orally 3 times a day  Norvasc 10 mg oral tablet: 1 tab(s) orally once a day  Plavix 75 mg oral tablet: 1 tab(s) orally once a day   acetaminophen 325 mg oral tablet: 2 tab(s) orally every 6 hours, As needed, Temp greater or equal to 38.5C (101.3F), Moderate Pain (4 - 6)  acetaminophen-codeine 300 mg-30 mg oral tablet: 1 tab(s) orally every 6 hours, As needed, Severe Pain (7 - 10) MDD:4 tabs  aspirin 81 mg oral tablet, chewable: 1 tab(s) orally once a day  atenolol 50 mg oral tablet: 1 tab(s) orally once a day  Calcium 600+D 600 mg-5 mcg (200 intl units) oral tablet: 1 tab(s) orally 3 times a day  diazePAM 5 mg oral tablet: 1 tab(s) orally every 8 hours, As needed, muscle spasm MDD:3 tabs  Norvasc 10 mg oral tablet: 1 tab(s) orally once a day  Plavix 75 mg oral tablet: 1 tab(s) orally once a day  Rolling walker: Roller walker for ambulation  sulfamethoxazole-trimethoprim 800 mg-160 mg oral tablet: 1 tab(s) orally 2 times a day   acetaminophen 325 mg oral tablet: 2 tab(s) orally every 6 hours, As needed, Temp greater or equal to 38.5C (101.3F), Moderate Pain (4 - 6)  acetaminophen-codeine 300 mg-30 mg oral tablet: 1 tab(s) orally every 6 hours, As needed, Severe Pain (7 - 10) MDD:4 tabs  aspirin 325 mg oral capsule: 1 cap(s) orally once a day   atenolol 50 mg oral tablet: 1 tab(s) orally once a day  Calcium 600+D 600 mg-5 mcg (200 intl units) oral tablet: 1 tab(s) orally 3 times a day  diazePAM 5 mg oral tablet: 1 tab(s) orally every 8 hours, As needed, muscle spasm MDD:3 tabs  Norvasc 10 mg oral tablet: 1 tab(s) orally once a day  pantoprazole 40 mg oral delayed release tablet: 1 tab(s) orally once a day (before a meal)  Rolling walker: Roller walker for ambulation

## 2024-01-23 LAB
ALBUMIN SERPL ELPH-MCNC: 4.8 G/DL
ALP BLD-CCNC: 79 U/L
ALT SERPL-CCNC: 12 U/L
ANION GAP SERPL CALC-SCNC: 13 MMOL/L
APPEARANCE: CLEAR
AST SERPL-CCNC: 15 U/L
BASOPHILS # BLD AUTO: 0.05 K/UL
BASOPHILS NFR BLD AUTO: 0.6 %
BILIRUB SERPL-MCNC: 0.6 MG/DL
BILIRUBIN URINE: NEGATIVE
BLOOD URINE: NEGATIVE
BUN SERPL-MCNC: 13 MG/DL
CALCIUM SERPL-MCNC: 9.1 MG/DL
CHLORIDE SERPL-SCNC: 105 MMOL/L
CHOLEST SERPL-MCNC: 172 MG/DL
CO2 SERPL-SCNC: 24 MMOL/L
COLOR: YELLOW
CREAT SERPL-MCNC: 0.66 MG/DL
EGFR: 92 ML/MIN/1.73M2
EOSINOPHIL # BLD AUTO: 0.23 K/UL
EOSINOPHIL NFR BLD AUTO: 2.7 %
ESTIMATED AVERAGE GLUCOSE: 123 MG/DL
GLUCOSE QUALITATIVE U: NEGATIVE MG/DL
GLUCOSE SERPL-MCNC: 104 MG/DL
HBA1C MFR BLD HPLC: 5.9 %
HCT VFR BLD CALC: 38 %
HDLC SERPL-MCNC: 62 MG/DL
HGB BLD-MCNC: 12.6 G/DL
IMM GRANULOCYTES NFR BLD AUTO: 0.4 %
KETONES URINE: NEGATIVE MG/DL
LDLC SERPL CALC-MCNC: 92 MG/DL
LEUKOCYTE ESTERASE URINE: ABNORMAL
LYMPHOCYTES # BLD AUTO: 2.69 K/UL
LYMPHOCYTES NFR BLD AUTO: 32 %
MAN DIFF?: NORMAL
MCHC RBC-ENTMCNC: 31.2 PG
MCHC RBC-ENTMCNC: 33.2 GM/DL
MCV RBC AUTO: 94.1 FL
MONOCYTES # BLD AUTO: 0.84 K/UL
MONOCYTES NFR BLD AUTO: 10 %
NEUTROPHILS # BLD AUTO: 4.56 K/UL
NEUTROPHILS NFR BLD AUTO: 54.3 %
NITRITE URINE: NEGATIVE
NONHDLC SERPL-MCNC: 110 MG/DL
PH URINE: 6
PLATELET # BLD AUTO: 237 K/UL
POTASSIUM SERPL-SCNC: 4 MMOL/L
PROT SERPL-MCNC: 7.2 G/DL
PROTEIN URINE: NEGATIVE MG/DL
RBC # BLD: 4.04 M/UL
RBC # FLD: 13.9 %
SODIUM SERPL-SCNC: 141 MMOL/L
SPECIFIC GRAVITY URINE: 1.01
TRIGL SERPL-MCNC: 99 MG/DL
TSH SERPL-ACNC: 2.18 UIU/ML
UROBILINOGEN URINE: 0.2 MG/DL
WBC # FLD AUTO: 8.4 K/UL

## 2024-04-25 ENCOUNTER — NON-APPOINTMENT (OUTPATIENT)
Age: 74
End: 2024-04-25

## 2024-04-25 ENCOUNTER — APPOINTMENT (OUTPATIENT)
Dept: INTERNAL MEDICINE | Facility: CLINIC | Age: 74
End: 2024-04-25
Payer: MEDICARE

## 2024-04-25 VITALS
TEMPERATURE: 97.9 F | DIASTOLIC BLOOD PRESSURE: 79 MMHG | BODY MASS INDEX: 28.93 KG/M2 | RESPIRATION RATE: 15 BRPM | OXYGEN SATURATION: 97 % | HEIGHT: 66 IN | HEART RATE: 72 BPM | SYSTOLIC BLOOD PRESSURE: 128 MMHG | WEIGHT: 180 LBS

## 2024-04-25 DIAGNOSIS — E78.5 HYPERLIPIDEMIA, UNSPECIFIED: ICD-10-CM

## 2024-04-25 DIAGNOSIS — F41.9 ANXIETY DISORDER, UNSPECIFIED: ICD-10-CM

## 2024-04-25 DIAGNOSIS — Z29.9 ENCOUNTER FOR PROPHYLACTIC MEASURES, UNSPECIFIED: ICD-10-CM

## 2024-04-25 DIAGNOSIS — I48.0 PAROXYSMAL ATRIAL FIBRILLATION: ICD-10-CM

## 2024-04-25 DIAGNOSIS — Z12.31 ENCOUNTER FOR SCREENING MAMMOGRAM FOR MALIGNANT NEOPLASM OF BREAST: ICD-10-CM

## 2024-04-25 DIAGNOSIS — Z00.00 ENCOUNTER FOR GENERAL ADULT MEDICAL EXAMINATION W/OUT ABNORMAL FINDINGS: ICD-10-CM

## 2024-04-25 DIAGNOSIS — Z12.11 ENCOUNTER FOR SCREENING FOR MALIGNANT NEOPLASM OF COLON: ICD-10-CM

## 2024-04-25 DIAGNOSIS — R73.03 PREDIABETES.: ICD-10-CM

## 2024-04-25 DIAGNOSIS — I10 ESSENTIAL (PRIMARY) HYPERTENSION: ICD-10-CM

## 2024-04-25 DIAGNOSIS — Z13.6 ENCOUNTER FOR SCREENING FOR CARDIOVASCULAR DISORDERS: ICD-10-CM

## 2024-04-25 LAB
HCT VFR BLD CALC: 37.9 %
HGB BLD-MCNC: 12.5 G/DL
MCHC RBC-ENTMCNC: 30.9 PG
MCHC RBC-ENTMCNC: 33 GM/DL
MCV RBC AUTO: 93.6 FL
PLATELET # BLD AUTO: 211 K/UL
RBC # BLD: 4.05 M/UL
RBC # FLD: 13.6 %
WBC # FLD AUTO: 7.89 K/UL

## 2024-04-25 PROCEDURE — 36415 COLL VENOUS BLD VENIPUNCTURE: CPT

## 2024-04-25 PROCEDURE — 93000 ELECTROCARDIOGRAM COMPLETE: CPT | Mod: 59

## 2024-04-25 PROCEDURE — G0444 DEPRESSION SCREEN ANNUAL: CPT

## 2024-04-25 PROCEDURE — G0439: CPT

## 2024-04-25 RX ORDER — ASPIRIN 81 MG/1
81 TABLET, COATED ORAL
Qty: 90 | Refills: 0 | Status: ACTIVE | COMMUNITY
Start: 2023-12-06 | End: 1900-01-01

## 2024-04-25 RX ORDER — AMLODIPINE BESYLATE 5 MG/1
5 TABLET ORAL
Qty: 90 | Refills: 2 | Status: ACTIVE | COMMUNITY
Start: 2022-11-07 | End: 1900-01-01

## 2024-04-25 RX ORDER — ATENOLOL 25 MG/1
25 TABLET ORAL
Qty: 90 | Refills: 1 | Status: ACTIVE | COMMUNITY
Start: 2019-05-02 | End: 1900-01-01

## 2024-04-25 NOTE — ASSESSMENT
[FreeTextEntry1] : #CPE f/u blood and urine ekg - hx of afib f/u cardiology  going to [facility] - reports feels well now w/ bp medication adjusted - need refill  immunizations - deferred I spent 5 minutes with the patient conducting a screen using approved screening tool (PHQ2) and discussing results of said screen with patient during this encounter. The patient was counseled to get regular exercise and sleep, wear sunblock and wear a safety belt in the car. Check CBC, CMP Hgba1c , TSH and UA Mammo Ophtho Derm Dental DEXA #AFIB/HTN/PREDIABETES/HL continue to watch sugar, low carb diet, low starch, diet and exercise. lifestyle modification. Continue Medication DASH DIET Exercise. Lower your salt intake. Learn relaxation methods. Do not add salt while preparing or eating your meals. Try to avoid red meats, sweets and sugar containing beverages. Ensure you are eating 5 fruits and vegetables a day as well as whole grains. continue anticoagulation for afib low cholesterol diet, life style modification, increase exercise, more fruits and vegetables less sweet, carbs and starchy foods #ANXITEY well controlled mood stable denies si/hi pt denies any recent upper/lower GI bleed or easily brusing tolerating eliquis well advised notify MD if any bleeding pt agrees and understands plan via teach back method. all questions answered.

## 2024-04-25 NOTE — HEALTH RISK ASSESSMENT
[No] : No [No falls in past year] : Patient reported no falls in the past year [0] : 2) Feeling down, depressed, or hopeless: Not at all (0) [PHQ-2 Negative - No further assessment needed] : PHQ-2 Negative - No further assessment needed [HIV test declined] : HIV test declined [Hepatitis C test declined] : Hepatitis C test declined [None] : None [With Family] : lives with family [] :  [Fully functional (bathing, dressing, toileting, transferring, walking, feeding)] : Fully functional (bathing, dressing, toileting, transferring, walking, feeding) [Fully functional (using the telephone, shopping, preparing meals, housekeeping, doing laundry, using] : Fully functional and needs no help or supervision to perform IADLs (using the telephone, shopping, preparing meals, housekeeping, doing laundry, using transportation, managing medications and managing finances) [Reports normal functional visual acuity (ie: able to read med bottle)] : Reports normal functional visual acuity [Smoke Detector] : smoke detector [Carbon Monoxide Detector] : carbon monoxide detector [Safety elements used in home] : safety elements used in home [Seat Belt] :  uses seat belt [Sunscreen] : uses sunscreen [With Patient/Caregiver] : , with patient/caregiver [Designated Healthcare Proxy] : Designated healthcare proxy [I will adhere to the patient's wishes.] : I will adhere to the patient's wishes. [Time Spent: ___ minutes] : Time Spent: [unfilled] minutes [Never] : Never [# Of Children ___] : has [unfilled] children [Feels Safe at Home] : Feels safe at home [Name: ___] : Health Care Proxy's Name: [unfilled]  [Relationship: ___] : Relationship: [unfilled] [[id number]] : 0 [Change in mental status noted] : No change in mental status noted [Language] : denies difficulty with language [Behavior] : denies difficulty with behavior [Learning/Retaining New Information] : denies difficulty learning/retaining new information [Handling Complex Tasks] : denies difficulty handling complex tasks [Reasoning] : denies difficulty with reasoning [Spatial Ability and Orientation] : denies difficulty with spatial ability and orientation [Sexually Active] : not sexually active [High Risk Behavior] : no high risk behavior [Reports changes in hearing] : Reports no changes in hearing [Reports changes in vision] : Reports no changes in vision [Reports changes in dental health] : Reports no changes in dental health [Guns at Home] : no guns at home [Travel to Developing Areas] : does not  travel to developing areas [TB Exposure] : is not being exposed to tuberculosis [Caregiver Concerns] : does not have caregiver concerns [AdvancecareDate] : 4/25/24 [FreeTextEntry4] : TBD

## 2024-04-25 NOTE — HISTORY OF PRESENT ILLNESS
[de-identified] : 74 year F  here for Complete Physical Exam. Accompanied by daughter - [facility] proxy is SYMA daughter - pt agrees to speak to both daughters about health Pt is daily exercising? No Vaccinations: covid - utd flu - declined tdap - deferred  shingles - deferred will getat pharmacy  pcv - due for pcv 20 - deferred   Dr. Rogers - Cardiology Dr. Graham - Vascular 4/17/23 - was visiting son in [place] went to ER for headache for migrane and low HR - cut back atenolol from 50mg to 25mg -Past Medical History: HTN, HL, Prediabetes, Afib -Allergies:  NKDA -Medications: asa 81 amlodipine 5mg qd lasix 20mg qd eliquis 5mg bid atenolol 25mg qd Screening: colonoscopy: 2019 declined further testing obgyn: Dr. Michaels last pap: declined last mammo: 2021 due for mammo due for DEXA Scan -Social ETOH - socially Smoker - denies Illicit Drug use - denies  -Occupation: Homemaker  Pt has no acute complaints Pt denies any ha, dizziness, tinnitus, lightheadedness, epistaxis, vision changes, chest pain, palpitations,  sob, syncopal episodes, MERCADO, or leg swelling, n/v abdominal pain.

## 2024-04-25 NOTE — PHYSICAL EXAM
[No Acute Distress] : no acute distress [Well Nourished] : well nourished [Well Developed] : well developed [Normal Sclera/Conjunctiva] : normal sclera/conjunctiva [PERRL] : pupils equal round and reactive to light [EOMI] : extraocular movements intact [Normal Outer Ear/Nose] : the outer ears and nose were normal in appearance [Normal Oropharynx] : the oropharynx was normal [No Lymphadenopathy] : no lymphadenopathy [No Respiratory Distress] : no respiratory distress  [No Accessory Muscle Use] : no accessory muscle use [Clear to Auscultation] : lungs were clear to auscultation bilaterally [Declined Breast Exam] : declined breast exam  [Soft] : abdomen soft [Non Tender] : non-tender [Non-distended] : non-distended [Normal Bowel Sounds] : normal bowel sounds [Declined Rectal Exam] : declined rectal exam [Normal Posterior Cervical Nodes] : no posterior cervical lymphadenopathy [Normal Anterior Cervical Nodes] : no anterior cervical lymphadenopathy [No CVA Tenderness] : no CVA  tenderness [No Spinal Tenderness] : no spinal tenderness [No Joint Swelling] : no joint swelling [Grossly Normal Strength/Tone] : grossly normal strength/tone [No Rash] : no rash [Coordination Grossly Intact] : coordination grossly intact [No Focal Deficits] : no focal deficits [Normal Gait] : normal gait [Deep Tendon Reflexes (DTR)] : deep tendon reflexes were 2+ and symmetric [Normal Affect] : the affect was normal [Normal Insight/Judgement] : insight and judgment were intact [de-identified] : RRR [de-identified] : wearing compression stockings [de-identified] : deferred

## 2024-04-26 LAB
25(OH)D3 SERPL-MCNC: 31.5 NG/ML
ALBUMIN SERPL ELPH-MCNC: 4.5 G/DL
ALP BLD-CCNC: 82 U/L
ALT SERPL-CCNC: 15 U/L
ANION GAP SERPL CALC-SCNC: 13 MMOL/L
APPEARANCE: CLEAR
AST SERPL-CCNC: 20 U/L
BACTERIA: NEGATIVE /HPF
BILIRUB SERPL-MCNC: 0.6 MG/DL
BILIRUBIN URINE: NEGATIVE
BLOOD URINE: NEGATIVE
BUN SERPL-MCNC: 11 MG/DL
CALCIUM SERPL-MCNC: 9.2 MG/DL
CAST: 0 /LPF
CHLORIDE SERPL-SCNC: 104 MMOL/L
CHOLEST SERPL-MCNC: 158 MG/DL
CO2 SERPL-SCNC: 24 MMOL/L
COLOR: YELLOW
CREAT SERPL-MCNC: 0.73 MG/DL
EGFR: 86 ML/MIN/1.73M2
EPITHELIAL CELLS: 6 /HPF
ESTIMATED AVERAGE GLUCOSE: 123 MG/DL
FOLATE SERPL-MCNC: 13.7 NG/ML
GLUCOSE QUALITATIVE U: NEGATIVE MG/DL
GLUCOSE SERPL-MCNC: 117 MG/DL
HBA1C MFR BLD HPLC: 5.9 %
HDLC SERPL-MCNC: 63 MG/DL
IRON SATN MFR SERPL: 26 %
IRON SERPL-MCNC: 83 UG/DL
KETONES URINE: NEGATIVE MG/DL
LDLC SERPL CALC-MCNC: 81 MG/DL
LEUKOCYTE ESTERASE URINE: ABNORMAL
MICROSCOPIC-UA: NORMAL
NITRITE URINE: NEGATIVE
NONHDLC SERPL-MCNC: 95 MG/DL
PH URINE: 6
POTASSIUM SERPL-SCNC: 4.3 MMOL/L
PROT SERPL-MCNC: 7 G/DL
PROTEIN URINE: NEGATIVE MG/DL
RED BLOOD CELLS URINE: 2 /HPF
REVIEW: NORMAL
SODIUM SERPL-SCNC: 141 MMOL/L
SPECIFIC GRAVITY URINE: 1.01
TIBC SERPL-MCNC: 316 UG/DL
TRIGL SERPL-MCNC: 73 MG/DL
TSH SERPL-ACNC: 3.01 UIU/ML
UIBC SERPL-MCNC: 233 UG/DL
UROBILINOGEN URINE: 0.2 MG/DL
VIT B12 SERPL-MCNC: 616 PG/ML
WHITE BLOOD CELLS URINE: 1 /HPF

## 2024-05-22 RX ORDER — APIXABAN 5 MG/1
5 TABLET, FILM COATED ORAL
Qty: 180 | Refills: 1 | Status: ACTIVE | COMMUNITY
Start: 2023-12-06 | End: 1900-01-01

## 2024-06-08 ENCOUNTER — RX RENEWAL (OUTPATIENT)
Age: 74
End: 2024-06-08

## 2024-06-08 RX ORDER — FUROSEMIDE 20 MG/1
20 TABLET ORAL
Qty: 90 | Refills: 1 | Status: ACTIVE | COMMUNITY
Start: 2023-12-06 | End: 1900-01-01

## 2024-07-29 ENCOUNTER — RX RENEWAL (OUTPATIENT)
Age: 74
End: 2024-07-29

## 2024-10-03 ENCOUNTER — RX RENEWAL (OUTPATIENT)
Age: 74
End: 2024-10-03

## 2024-12-18 ENCOUNTER — APPOINTMENT (OUTPATIENT)
Dept: INTERNAL MEDICINE | Facility: CLINIC | Age: 74
End: 2024-12-18
Payer: MEDICARE

## 2024-12-18 VITALS
SYSTOLIC BLOOD PRESSURE: 131 MMHG | RESPIRATION RATE: 16 BRPM | OXYGEN SATURATION: 95 % | WEIGHT: 177 LBS | HEIGHT: 66 IN | TEMPERATURE: 98.5 F | DIASTOLIC BLOOD PRESSURE: 69 MMHG | BODY MASS INDEX: 28.45 KG/M2 | HEART RATE: 69 BPM

## 2024-12-18 DIAGNOSIS — I48.0 PAROXYSMAL ATRIAL FIBRILLATION: ICD-10-CM

## 2024-12-18 DIAGNOSIS — R73.03 PREDIABETES.: ICD-10-CM

## 2024-12-18 DIAGNOSIS — E78.5 HYPERLIPIDEMIA, UNSPECIFIED: ICD-10-CM

## 2024-12-18 DIAGNOSIS — I10 ESSENTIAL (PRIMARY) HYPERTENSION: ICD-10-CM

## 2024-12-18 PROCEDURE — 36415 COLL VENOUS BLD VENIPUNCTURE: CPT

## 2024-12-18 PROCEDURE — 90662 IIV NO PRSV INCREASED AG IM: CPT

## 2024-12-18 PROCEDURE — 99213 OFFICE O/P EST LOW 20 MIN: CPT | Mod: 25

## 2024-12-18 PROCEDURE — G0008: CPT

## 2024-12-23 ENCOUNTER — APPOINTMENT (OUTPATIENT)
Dept: OBGYN | Facility: CLINIC | Age: 74
End: 2024-12-23
Payer: MEDICARE

## 2024-12-23 VITALS
DIASTOLIC BLOOD PRESSURE: 72 MMHG | HEIGHT: 66 IN | WEIGHT: 180 LBS | BODY MASS INDEX: 28.93 KG/M2 | SYSTOLIC BLOOD PRESSURE: 117 MMHG

## 2024-12-23 DIAGNOSIS — N39.0 URINARY TRACT INFECTION, SITE NOT SPECIFIED: ICD-10-CM

## 2024-12-23 DIAGNOSIS — N89.8 OTHER SPECIFIED NONINFLAMMATORY DISORDERS OF VAGINA: ICD-10-CM

## 2024-12-23 LAB
BILIRUB UR QL STRIP: NEGATIVE
CLARITY UR: CLEAR
COLLECTION METHOD: NORMAL
GLUCOSE UR-MCNC: NEGATIVE
HCG UR QL: 0.2 EU/DL
HGB UR QL STRIP.AUTO: NORMAL
KETONES UR-MCNC: NEGATIVE
LEUKOCYTE ESTERASE UR QL STRIP: NEGATIVE
NITRITE UR QL STRIP: NEGATIVE
PH UR STRIP: 5.5
PROT UR STRIP-MCNC: NEGATIVE
SP GR UR STRIP: 1.02

## 2024-12-23 PROCEDURE — 99212 OFFICE O/P EST SF 10 MIN: CPT

## 2024-12-23 RX ORDER — CEPHALEXIN 250 MG/1
250 CAPSULE ORAL
Qty: 90 | Refills: 1 | Status: ACTIVE | COMMUNITY
Start: 2024-12-23 | End: 1900-01-01

## 2024-12-23 RX ORDER — ESTRADIOL 0.1 MG/G
0.1 CREAM VAGINAL
Qty: 1 | Refills: 3 | Status: ACTIVE | COMMUNITY
Start: 2024-12-23 | End: 1900-01-01

## 2024-12-24 LAB
ALBUMIN SERPL ELPH-MCNC: 4.7 G/DL
ALP BLD-CCNC: 78 U/L
ALT SERPL-CCNC: 11 U/L
ANION GAP SERPL CALC-SCNC: 15 MMOL/L
APPEARANCE: CLEAR
AST SERPL-CCNC: 16 U/L
BASOPHILS # BLD AUTO: 0.06 K/UL
BASOPHILS NFR BLD AUTO: 0.7 %
BILIRUB SERPL-MCNC: 0.5 MG/DL
BILIRUBIN URINE: NEGATIVE
BLOOD URINE: NEGATIVE
BUN SERPL-MCNC: 25 MG/DL
CALCIUM SERPL-MCNC: 9.4 MG/DL
CHLORIDE SERPL-SCNC: 98 MMOL/L
CHOLEST SERPL-MCNC: 181 MG/DL
CO2 SERPL-SCNC: 25 MMOL/L
COLOR: YELLOW
CREAT SERPL-MCNC: 0.91 MG/DL
EGFR: 66 ML/MIN/1.73M2
EOSINOPHIL # BLD AUTO: 0.26 K/UL
EOSINOPHIL NFR BLD AUTO: 3 %
ESTIMATED AVERAGE GLUCOSE: 126 MG/DL
GLUCOSE QUALITATIVE U: NEGATIVE MG/DL
GLUCOSE SERPL-MCNC: 95 MG/DL
HBA1C MFR BLD HPLC: 6 %
HCT VFR BLD CALC: 40.7 %
HDLC SERPL-MCNC: 65 MG/DL
HGB BLD-MCNC: 13.1 G/DL
IMM GRANULOCYTES NFR BLD AUTO: 0.2 %
KETONES URINE: NEGATIVE MG/DL
LDLC SERPL CALC-MCNC: 95 MG/DL
LEUKOCYTE ESTERASE URINE: NEGATIVE
LYMPHOCYTES # BLD AUTO: 3.17 K/UL
LYMPHOCYTES NFR BLD AUTO: 36.1 %
MAN DIFF?: NORMAL
MCHC RBC-ENTMCNC: 30.6 PG
MCHC RBC-ENTMCNC: 32.2 G/DL
MCV RBC AUTO: 95.1 FL
MONOCYTES # BLD AUTO: 1 K/UL
MONOCYTES NFR BLD AUTO: 11.4 %
NEUTROPHILS # BLD AUTO: 4.28 K/UL
NEUTROPHILS NFR BLD AUTO: 48.6 %
NITRITE URINE: NEGATIVE
NONHDLC SERPL-MCNC: 116 MG/DL
PH URINE: 5.5
PLATELET # BLD AUTO: 236 K/UL
POTASSIUM SERPL-SCNC: 4.1 MMOL/L
PROT SERPL-MCNC: 7.7 G/DL
PROTEIN URINE: NEGATIVE MG/DL
RBC # BLD: 4.28 M/UL
RBC # FLD: 13.6 %
SODIUM SERPL-SCNC: 137 MMOL/L
SPECIFIC GRAVITY URINE: 1.02
TRIGL SERPL-MCNC: 117 MG/DL
TSH SERPL-ACNC: 2.99 UIU/ML
UROBILINOGEN URINE: 0.2 MG/DL
WBC # FLD AUTO: 8.79 K/UL

## 2024-12-27 ENCOUNTER — RX RENEWAL (OUTPATIENT)
Age: 74
End: 2024-12-27

## 2024-12-30 ENCOUNTER — APPOINTMENT (OUTPATIENT)
Dept: CARDIOLOGY | Facility: CLINIC | Age: 74
End: 2024-12-30
Payer: MEDICARE

## 2024-12-30 ENCOUNTER — NON-APPOINTMENT (OUTPATIENT)
Age: 74
End: 2024-12-30

## 2024-12-30 VITALS
BODY MASS INDEX: 28.45 KG/M2 | HEIGHT: 66 IN | DIASTOLIC BLOOD PRESSURE: 72 MMHG | TEMPERATURE: 98 F | SYSTOLIC BLOOD PRESSURE: 122 MMHG | WEIGHT: 177 LBS | OXYGEN SATURATION: 97 % | HEART RATE: 65 BPM

## 2024-12-30 DIAGNOSIS — G45.9 TRANSIENT CEREBRAL ISCHEMIC ATTACK, UNSPECIFIED: ICD-10-CM

## 2024-12-30 DIAGNOSIS — Z79.82 LONG TERM (CURRENT) USE OF ASPIRIN: ICD-10-CM

## 2024-12-30 DIAGNOSIS — I67.1 CEREBRAL ANEURYSM, NONRUPTURED: ICD-10-CM

## 2024-12-30 DIAGNOSIS — M79.89 OTHER SPECIFIED SOFT TISSUE DISORDERS: ICD-10-CM

## 2024-12-30 DIAGNOSIS — R73.03 PREDIABETES.: ICD-10-CM

## 2024-12-30 DIAGNOSIS — I10 ESSENTIAL (PRIMARY) HYPERTENSION: ICD-10-CM

## 2024-12-30 DIAGNOSIS — I48.0 PAROXYSMAL ATRIAL FIBRILLATION: ICD-10-CM

## 2024-12-30 PROCEDURE — G2211 COMPLEX E/M VISIT ADD ON: CPT

## 2024-12-30 PROCEDURE — 36415 COLL VENOUS BLD VENIPUNCTURE: CPT

## 2024-12-30 PROCEDURE — 93000 ELECTROCARDIOGRAM COMPLETE: CPT

## 2024-12-30 PROCEDURE — 99214 OFFICE O/P EST MOD 30 MIN: CPT

## 2024-12-31 ENCOUNTER — MESSAGE (OUTPATIENT)
Age: 74
End: 2024-12-31

## 2024-12-31 LAB — NT-PROBNP SERPL-MCNC: 1250 PG/ML

## 2025-01-21 ENCOUNTER — APPOINTMENT (OUTPATIENT)
Dept: CARDIOLOGY | Facility: CLINIC | Age: 75
End: 2025-01-21
Payer: MEDICARE

## 2025-01-21 PROCEDURE — 93306 TTE W/DOPPLER COMPLETE: CPT

## 2025-02-12 ENCOUNTER — APPOINTMENT (OUTPATIENT)
Dept: VASCULAR SURGERY | Facility: CLINIC | Age: 75
End: 2025-02-12
Payer: MEDICARE

## 2025-02-12 VITALS
SYSTOLIC BLOOD PRESSURE: 119 MMHG | DIASTOLIC BLOOD PRESSURE: 78 MMHG | HEIGHT: 66 IN | TEMPERATURE: 98.4 F | BODY MASS INDEX: 28.12 KG/M2 | OXYGEN SATURATION: 95 % | WEIGHT: 175 LBS | HEART RATE: 65 BPM

## 2025-02-12 PROCEDURE — 93970 EXTREMITY STUDY: CPT

## 2025-02-12 PROCEDURE — 99213 OFFICE O/P EST LOW 20 MIN: CPT

## 2025-04-16 ENCOUNTER — APPOINTMENT (OUTPATIENT)
Dept: INTERNAL MEDICINE | Facility: CLINIC | Age: 75
End: 2025-04-16
Payer: MEDICARE

## 2025-04-16 ENCOUNTER — NON-APPOINTMENT (OUTPATIENT)
Age: 75
End: 2025-04-16

## 2025-04-16 VITALS
TEMPERATURE: 97.9 F | WEIGHT: 183.02 LBS | SYSTOLIC BLOOD PRESSURE: 120 MMHG | HEIGHT: 66 IN | RESPIRATION RATE: 16 BRPM | BODY MASS INDEX: 29.41 KG/M2 | OXYGEN SATURATION: 97 % | HEART RATE: 64 BPM | DIASTOLIC BLOOD PRESSURE: 61 MMHG

## 2025-04-16 DIAGNOSIS — I83.893 VARICOSE VEINS OF BILATERAL LOWER EXTREMITIES WITH OTHER COMPLICATIONS: ICD-10-CM

## 2025-04-16 PROCEDURE — 99214 OFFICE O/P EST MOD 30 MIN: CPT

## 2025-04-16 PROCEDURE — 36415 COLL VENOUS BLD VENIPUNCTURE: CPT

## 2025-04-16 PROCEDURE — G2211 COMPLEX E/M VISIT ADD ON: CPT

## 2025-04-17 LAB
ANION GAP SERPL CALC-SCNC: 12 MMOL/L
BASOPHILS # BLD AUTO: 0.07 K/UL
BASOPHILS NFR BLD AUTO: 0.7 %
BUN SERPL-MCNC: 19 MG/DL
CALCIUM SERPL-MCNC: 9.4 MG/DL
CHLORIDE SERPL-SCNC: 101 MMOL/L
CO2 SERPL-SCNC: 27 MMOL/L
CREAT SERPL-MCNC: 0.74 MG/DL
EGFRCR SERPLBLD CKD-EPI 2021: 84 ML/MIN/1.73M2
EOSINOPHIL # BLD AUTO: 0.3 K/UL
EOSINOPHIL NFR BLD AUTO: 3.2 %
GLUCOSE SERPL-MCNC: 98 MG/DL
HCT VFR BLD CALC: 37.7 %
HGB BLD-MCNC: 12.6 G/DL
IMM GRANULOCYTES NFR BLD AUTO: 0.4 %
LYMPHOCYTES # BLD AUTO: 3.48 K/UL
LYMPHOCYTES NFR BLD AUTO: 36.8 %
MAN DIFF?: NORMAL
MCHC RBC-ENTMCNC: 31.1 PG
MCHC RBC-ENTMCNC: 33.4 G/DL
MCV RBC AUTO: 93.1 FL
MONOCYTES # BLD AUTO: 1.08 K/UL
MONOCYTES NFR BLD AUTO: 11.4 %
NEUTROPHILS # BLD AUTO: 4.49 K/UL
NEUTROPHILS NFR BLD AUTO: 47.5 %
PLATELET # BLD AUTO: 238 K/UL
POTASSIUM SERPL-SCNC: 4.2 MMOL/L
RBC # BLD: 4.05 M/UL
RBC # FLD: 13.6 %
SODIUM SERPL-SCNC: 140 MMOL/L
WBC # FLD AUTO: 9.46 K/UL

## 2025-04-24 ENCOUNTER — APPOINTMENT (OUTPATIENT)
Dept: CARDIOLOGY | Facility: CLINIC | Age: 75
End: 2025-04-24
Payer: MEDICARE

## 2025-04-24 ENCOUNTER — NON-APPOINTMENT (OUTPATIENT)
Age: 75
End: 2025-04-24

## 2025-04-24 VITALS
TEMPERATURE: 98.2 F | HEIGHT: 66 IN | SYSTOLIC BLOOD PRESSURE: 146 MMHG | OXYGEN SATURATION: 96 % | HEART RATE: 73 BPM | DIASTOLIC BLOOD PRESSURE: 74 MMHG | BODY MASS INDEX: 29.57 KG/M2 | WEIGHT: 184 LBS | RESPIRATION RATE: 15 BRPM

## 2025-04-24 VITALS — DIASTOLIC BLOOD PRESSURE: 70 MMHG | SYSTOLIC BLOOD PRESSURE: 138 MMHG

## 2025-04-24 DIAGNOSIS — G45.9 TRANSIENT CEREBRAL ISCHEMIC ATTACK, UNSPECIFIED: ICD-10-CM

## 2025-04-24 DIAGNOSIS — I10 ESSENTIAL (PRIMARY) HYPERTENSION: ICD-10-CM

## 2025-04-24 DIAGNOSIS — M79.89 OTHER SPECIFIED SOFT TISSUE DISORDERS: ICD-10-CM

## 2025-04-24 DIAGNOSIS — I48.0 PAROXYSMAL ATRIAL FIBRILLATION: ICD-10-CM

## 2025-04-24 DIAGNOSIS — R73.03 PREDIABETES.: ICD-10-CM

## 2025-04-24 DIAGNOSIS — R07.9 CHEST PAIN, UNSPECIFIED: ICD-10-CM

## 2025-04-24 DIAGNOSIS — E78.5 HYPERLIPIDEMIA, UNSPECIFIED: ICD-10-CM

## 2025-04-24 PROCEDURE — 93000 ELECTROCARDIOGRAM COMPLETE: CPT

## 2025-04-24 PROCEDURE — G2211 COMPLEX E/M VISIT ADD ON: CPT

## 2025-04-24 PROCEDURE — 99214 OFFICE O/P EST MOD 30 MIN: CPT

## 2025-04-28 ENCOUNTER — APPOINTMENT (OUTPATIENT)
Dept: CV DIAGNOSTICS | Facility: HOSPITAL | Age: 75
End: 2025-04-28

## 2025-04-28 ENCOUNTER — OUTPATIENT (OUTPATIENT)
Dept: OUTPATIENT SERVICES | Facility: HOSPITAL | Age: 75
LOS: 1 days | End: 2025-04-28
Payer: MEDICARE

## 2025-04-28 ENCOUNTER — RESULT REVIEW (OUTPATIENT)
Age: 75
End: 2025-04-28

## 2025-04-28 DIAGNOSIS — Z90.49 ACQUIRED ABSENCE OF OTHER SPECIFIED PARTS OF DIGESTIVE TRACT: Chronic | ICD-10-CM

## 2025-04-28 DIAGNOSIS — Z98.890 OTHER SPECIFIED POSTPROCEDURAL STATES: Chronic | ICD-10-CM

## 2025-04-28 PROCEDURE — 93016 CV STRESS TEST SUPVJ ONLY: CPT

## 2025-04-28 PROCEDURE — A9500: CPT

## 2025-04-28 PROCEDURE — 93018 CV STRESS TEST I&R ONLY: CPT

## 2025-04-28 PROCEDURE — 93017 CV STRESS TEST TRACING ONLY: CPT

## 2025-04-28 PROCEDURE — 78452 HT MUSCLE IMAGE SPECT MULT: CPT | Mod: 26

## 2025-04-28 PROCEDURE — 78452 HT MUSCLE IMAGE SPECT MULT: CPT | Mod: MC

## 2025-05-02 ENCOUNTER — TRANSCRIPTION ENCOUNTER (OUTPATIENT)
Age: 75
End: 2025-05-02

## 2025-05-02 ENCOUNTER — OUTPATIENT (OUTPATIENT)
Dept: OUTPATIENT SERVICES | Facility: HOSPITAL | Age: 75
LOS: 1 days | End: 2025-05-02
Payer: MEDICARE

## 2025-05-02 VITALS
HEIGHT: 66 IN | WEIGHT: 179.9 LBS | SYSTOLIC BLOOD PRESSURE: 145 MMHG | DIASTOLIC BLOOD PRESSURE: 67 MMHG | RESPIRATION RATE: 18 BRPM | TEMPERATURE: 98 F | HEART RATE: 69 BPM | OXYGEN SATURATION: 97 %

## 2025-05-02 VITALS
SYSTOLIC BLOOD PRESSURE: 133 MMHG | OXYGEN SATURATION: 95 % | DIASTOLIC BLOOD PRESSURE: 59 MMHG | RESPIRATION RATE: 18 BRPM | HEART RATE: 60 BPM

## 2025-05-02 DIAGNOSIS — Z90.49 ACQUIRED ABSENCE OF OTHER SPECIFIED PARTS OF DIGESTIVE TRACT: Chronic | ICD-10-CM

## 2025-05-02 DIAGNOSIS — Z98.890 OTHER SPECIFIED POSTPROCEDURAL STATES: Chronic | ICD-10-CM

## 2025-05-02 DIAGNOSIS — R06.09 OTHER FORMS OF DYSPNEA: ICD-10-CM

## 2025-05-02 LAB
ANION GAP SERPL CALC-SCNC: 15 MMOL/L — SIGNIFICANT CHANGE UP (ref 5–17)
BUN SERPL-MCNC: 17 MG/DL — SIGNIFICANT CHANGE UP (ref 7–23)
CALCIUM SERPL-MCNC: 9.1 MG/DL — SIGNIFICANT CHANGE UP (ref 8.4–10.5)
CHLORIDE SERPL-SCNC: 102 MMOL/L — SIGNIFICANT CHANGE UP (ref 96–108)
CO2 SERPL-SCNC: 23 MMOL/L — SIGNIFICANT CHANGE UP (ref 22–31)
CREAT SERPL-MCNC: 0.74 MG/DL — SIGNIFICANT CHANGE UP (ref 0.5–1.3)
EGFR: 84 ML/MIN/1.73M2 — SIGNIFICANT CHANGE UP
EGFR: 84 ML/MIN/1.73M2 — SIGNIFICANT CHANGE UP
GLUCOSE SERPL-MCNC: 97 MG/DL — SIGNIFICANT CHANGE UP (ref 70–99)
HCT VFR BLD CALC: 35.7 % — SIGNIFICANT CHANGE UP (ref 34.5–45)
HGB BLD-MCNC: 12.2 G/DL — SIGNIFICANT CHANGE UP (ref 11.5–15.5)
MCHC RBC-ENTMCNC: 31.4 PG — SIGNIFICANT CHANGE UP (ref 27–34)
MCHC RBC-ENTMCNC: 34.2 G/DL — SIGNIFICANT CHANGE UP (ref 32–36)
MCV RBC AUTO: 92 FL — SIGNIFICANT CHANGE UP (ref 80–100)
NRBC BLD AUTO-RTO: 0 /100 WBCS — SIGNIFICANT CHANGE UP (ref 0–0)
PLATELET # BLD AUTO: 218 K/UL — SIGNIFICANT CHANGE UP (ref 150–400)
POTASSIUM SERPL-MCNC: 3.9 MMOL/L — SIGNIFICANT CHANGE UP (ref 3.5–5.3)
POTASSIUM SERPL-SCNC: 3.9 MMOL/L — SIGNIFICANT CHANGE UP (ref 3.5–5.3)
RBC # BLD: 3.88 M/UL — SIGNIFICANT CHANGE UP (ref 3.8–5.2)
RBC # FLD: 13.2 % — SIGNIFICANT CHANGE UP (ref 10.3–14.5)
SODIUM SERPL-SCNC: 140 MMOL/L — SIGNIFICANT CHANGE UP (ref 135–145)
WBC # BLD: 8.6 K/UL — SIGNIFICANT CHANGE UP (ref 3.8–10.5)
WBC # FLD AUTO: 8.6 K/UL — SIGNIFICANT CHANGE UP (ref 3.8–10.5)

## 2025-05-02 PROCEDURE — 93005 ELECTROCARDIOGRAM TRACING: CPT

## 2025-05-02 PROCEDURE — 93010 ELECTROCARDIOGRAM REPORT: CPT

## 2025-05-02 PROCEDURE — C1887: CPT

## 2025-05-02 PROCEDURE — 99152 MOD SED SAME PHYS/QHP 5/>YRS: CPT

## 2025-05-02 PROCEDURE — C1769: CPT

## 2025-05-02 PROCEDURE — C1894: CPT

## 2025-05-02 PROCEDURE — 93454 CORONARY ARTERY ANGIO S&I: CPT

## 2025-05-02 PROCEDURE — 85027 COMPLETE CBC AUTOMATED: CPT

## 2025-05-02 PROCEDURE — 93454 CORONARY ARTERY ANGIO S&I: CPT | Mod: 26

## 2025-05-02 PROCEDURE — 80048 BASIC METABOLIC PNL TOTAL CA: CPT

## 2025-05-02 RX ORDER — CEPHALEXIN 250 MG/1
1 CAPSULE ORAL
Refills: 0 | DISCHARGE

## 2025-05-02 RX ORDER — FUROSEMIDE 10 MG/ML
1 INJECTION INTRAMUSCULAR; INTRAVENOUS
Refills: 0 | DISCHARGE

## 2025-05-02 RX ORDER — ASPIRIN 325 MG
0 TABLET ORAL
Refills: 0 | DISCHARGE

## 2025-05-02 RX ORDER — APIXABAN 2.5 MG/1
1 TABLET, FILM COATED ORAL
Qty: 0 | Refills: 0 | DISCHARGE

## 2025-05-02 RX ORDER — LISINOPRIL 30 MG/1
1 TABLET ORAL
Refills: 0 | DISCHARGE

## 2025-05-02 NOTE — ASU PREOP CHECKLIST - DENTURES
05/13/22 0832   RT Protocol   History Pulmonary Disease 1   Respiratory pattern 0   Breath sounds 2   Cough 0   Bronchodilator Assessment Score 3 no

## 2025-05-02 NOTE — ASU DISCHARGE PLAN (ADULT/PEDIATRIC) - FINANCIAL ASSISTANCE
[facility] provides services at a reduced cost to those who are determined to be eligible through [facility]’s financial assistance program. Information regarding [facility]’s financial assistance program can be found by going to [email or web address] or by calling [phone].

## 2025-05-02 NOTE — H&P CARDIOLOGY - HISTORY OF PRESENT ILLNESS
75 yo F with PMH HTN, persistent Afib, hx of brain aneurysm s/p stent x2 presents today for cardiac angiogram. Patient is Chinese speaking- wishes to have her daughter who is her HCP sign consent tiday. All information obtained from her Daughter, Janel. patient with exertional midsternal CP and BL LE edema x 3 months. Followed by Dr Lopez Rogers (cards)- pt underwent a NST test (see results below). Here today for cardiac angiogram for further ischemic evalaution     NST 4/28/25  1. Myocardial Perfusion: Abnormal.   2. Qualitative Perfusion:      - medium-sized, moderate defect(s) in the mid to distal anterior and mid anterolateral walls that are reversible suggestive of ischemia.   3. The left ventricle is normal in function and normal in size. The post stress left ventricular EF is 72 %. The stress end diastolic volume is 66 ml and systolic volume is 18 ml.   4. This study was compared to prior study performed on 12/13/2023. Mid anterior, anterolateral perfusion defect which is reversible is more prominent.   75 yo F with PMH HTN, persistent Afib, hx of brain aneurysm s/p stent x2 presents today for cardiac angiogram. Patient is Luxem[place]ish speaking- wishes to have her daughter who is her HCP sign consent today All information obtained from her Daughter, Janel. patient with exertional midsternal CP and BL LE edema x 3 months. Followed by Dr Lopez Rogers (cards)- pt underwent a NST test (see results below). Here today for cardiac angiogram for further ischemic evaluation     NST 4/28/25  1. Myocardial Perfusion: Abnormal.   2. Qualitative Perfusion:      - medium-sized, moderate defect(s) in the mid to distal anterior and mid anterolateral walls that are reversible suggestive of ischemia.   3. The left ventricle is normal in function and normal in size. The post stress left ventricular EF is 72 %. The stress end diastolic volume is 66 ml and systolic volume is 18 ml.   4. This study was compared to prior study performed on 12/13/2023. Mid anterior, anterolateral perfusion defect which is reversible is more prominent.

## 2025-05-02 NOTE — ASU DISCHARGE PLAN (ADULT/PEDIATRIC) - NS MD DC FALL RISK RISK
For information on Fall & Injury Prevention, visit: [email or web address] OR  [email or web address] OR  https://www.cdc.gov/steadi/patient.html

## 2025-05-02 NOTE — ASU DISCHARGE PLAN (ADULT/PEDIATRIC) - ASU DC SPECIAL INSTRUCTIONSFT

## 2025-05-02 NOTE — ASU DISCHARGE PLAN (ADULT/PEDIATRIC) - CARE PROVIDER_API CALL
Yosvany Yanez  Nuclear Cardiology  [address] [place] [id number]  Phone: [phone]  Fax: [phone]  Established Patient  Follow Up Time: 2 weeks

## 2025-05-29 ENCOUNTER — RX RENEWAL (OUTPATIENT)
Age: 75
End: 2025-05-29